# Patient Record
Sex: FEMALE | Race: WHITE | NOT HISPANIC OR LATINO | Employment: OTHER | URBAN - METROPOLITAN AREA
[De-identification: names, ages, dates, MRNs, and addresses within clinical notes are randomized per-mention and may not be internally consistent; named-entity substitution may affect disease eponyms.]

---

## 2017-01-31 ENCOUNTER — ALLSCRIPTS OFFICE VISIT (OUTPATIENT)
Dept: OTHER | Facility: OTHER | Age: 67
End: 2017-01-31

## 2017-05-02 ENCOUNTER — ALLSCRIPTS OFFICE VISIT (OUTPATIENT)
Dept: OTHER | Facility: OTHER | Age: 67
End: 2017-05-02

## 2017-06-12 DIAGNOSIS — M85.80 OTHER SPECIFIED DISORDERS OF BONE DENSITY AND STRUCTURE, UNSPECIFIED SITE: ICD-10-CM

## 2017-06-22 ENCOUNTER — GENERIC CONVERSION - ENCOUNTER (OUTPATIENT)
Dept: OTHER | Facility: OTHER | Age: 67
End: 2017-06-22

## 2017-06-22 ENCOUNTER — HOSPITAL ENCOUNTER (OUTPATIENT)
Dept: RADIOLOGY | Age: 67
Discharge: HOME | End: 2017-06-22
Payer: COMMERCIAL

## 2017-06-22 DIAGNOSIS — M85.80 OTHER SPECIFIED DISORDERS OF BONE DENSITY AND STRUCTURE, UNSPECIFIED SITE: ICD-10-CM

## 2017-06-22 PROCEDURE — 77080 DXA BONE DENSITY AXIAL: CPT

## 2017-07-25 ENCOUNTER — ALLSCRIPTS OFFICE VISIT (OUTPATIENT)
Dept: OTHER | Facility: OTHER | Age: 67
End: 2017-07-25

## 2017-07-25 LAB
BILIRUB UR QL STRIP: NEGATIVE
CLARITY UR: NORMAL
COLOR UR: YELLOW
GLUCOSE (HISTORICAL): NEGATIVE
HGB UR QL STRIP.AUTO: NEGATIVE
KETONES UR STRIP-MCNC: NEGATIVE MG/DL
LEUKOCYTE ESTERASE UR QL STRIP: NORMAL
NITRITE UR QL STRIP: NEGATIVE
PH UR STRIP.AUTO: 6 [PH]
PROT UR STRIP-MCNC: NORMAL MG/DL
SP GR UR STRIP.AUTO: 1.01
UROBILINOGEN UR QL STRIP.AUTO: NEGATIVE

## 2017-10-26 ENCOUNTER — ALLSCRIPTS OFFICE VISIT (OUTPATIENT)
Dept: OTHER | Facility: OTHER | Age: 67
End: 2017-10-26

## 2017-10-26 DIAGNOSIS — Z12.31 ENCOUNTER FOR SCREENING MAMMOGRAM FOR MALIGNANT NEOPLASM OF BREAST: ICD-10-CM

## 2017-10-27 NOTE — PROGRESS NOTES
Assessment  1  Benign essential hypertension (401 1) (I10)   2  Hyperlipidemia (272 4) (E78 5)   3  Hypothyroidism (244 9) (E03 9)    Plan   Hypothyroidism    · Begin or continue regular aerobic exercise  Gradually work up to at least {count1}  sessions of {dur1} of exercise a week ; Status:Complete;   Done: 26Oct2017   · Eat a low fat and low cholesterol diet ; Status:Complete;   Done: 26Oct2017   · We recommend that you bring your body mass index down to 26 ; Status:Complete;    Done: 26Oct2017   · Follow-up visit in 5 months Evaluation and Treatment  Follow-up  Status: Hold For -  Scheduling  Requested for: 26Oct2017   · (1) COMPREHENSIVE METABOLIC PANEL; Status:Active; Requested THI:[id number];    · (1) LIPID PANEL, FASTING; Status:Active; Requested for:03Mar2018;    · (1) TSH; Status:Active; Requested for:03Mar2018;   Need for immunization against influenza    · Fluzone High-Dose 0 5 ML Intramuscular Suspension Prefilled Syringe  Need for pneumococcal vaccination    · Pneumovax 23 25 MCG/0 5ML Injection Injectable    * MAMMO SCREENING BILATERAL W CAD; Status:Hold For - Scheduling; Requested YIR:[id number];   Perform:[facility]; FKC:[id number]; Last Updated Chyna Jasso; 10/26/2017 11:01:00 AM;Ordered; [address]: Encounter for screening mammogram for malignant neoplasm of breast; Ordered By:Jerry Hogan Civil; Discussion/Summary    Htn at goal continue lisinopril-hctz  lipids- continue statin ,low fat/low carb diet  exercsie and weight loss encouraged  hypothyroidism at [address]  pt to continue levothyroxine 88 mcgm  f/u for awv  Possible side effects of new medications were reviewed with the patient/guardian today  The treatment plan was reviewed with the patient/guardian  The patient/guardian understands and agrees with the treatment plan      Chief Complaint  blood work follow up   Patient is here today for follow up of chronic conditions described in HPI        History of Present Illness  pt presetsn for follow up chronic conditions  pt ahs htn on lisinopril - hctz  bp a t gaol  pt on simvastatin for hyperlipidemia  pt aslo on levothyroxine 88 mcgm  labs reviewed show cmp is nomral  lipds are good except triglycerides are 179  low card diet discussed  tsh is nomral  flu vaccine and pneumonvac 23 given  Review of Systems    Constitutional: no recent weight gain-- and-- no recent weight loss  Eyes: no eyesight problems  Cardiovascular: no chest pain-- and-- no lower extremity edema  Respiratory: no cough-- and-- no shortness of breath during exertion  Gastrointestinal: no abdominal pain,-- no nausea,-- no vomiting,-- no constipation-- and-- no diarrhea  Genitourinary: no dysuria  Musculoskeletal: no arthralgias-- and-- no myalgias  Integumentary: no rashes  Neurological: no headache-- and-- no dizziness  Hematologic/Lymphatic: no tendency for easy bleeding-- and-- no tendency for easy bruising  Active Problems  1  Benign essential hypertension (401 1) (I10)   2  Hyperlipidemia (272 4) (E78 5)   3  Hypothyroidism (244 9) (E03 9)   4  History of Need for prophylactic vaccination and inoculation against influenza (V04 81)   (Z23)   5  Osteopenia (733 90) (M85 80)   6  Screening for colon cancer (V76 51) (Z12 11)   7  Screening for osteoporosis (V82 81) (Z13 820)   8  Vitamin D deficiency (268 9) (E55 9)    Past Medical History  1  History of Benign paroxysmal vertigo, unspecified laterality (386 11) (H81 10)   2  History of Denial of substance abuse   3  History of Encounter for gynecological examination with Papanicolaou smear of cervix   (V72 31) (Z01 419)   4  History of Encounter for screening mammogram for malignant neoplasm of breast   (V76 12) (Z12 31)   5  Denied: History of mental disorder   6  History of Need for prophylactic vaccination and inoculation against influenza (V04 81)   (Z23)   7   History of Screening for malignant neoplasm of cervix (V76 2) (Z12 4)    The active problems and past medical history were reviewed and updated today  Surgical History  1  History of Tubal Ligation    Family History  Mother    1  Family history of diabetes mellitus (V18 0) (Z83 3)  Family History    2  Denied: Family history of Denial of substance abuse   3  Denied: No family history of mental disorder    Social History   · Being A Social Drinker   · Never A Smoker  The social history was reviewed and updated today  The social history was reviewed and is unchanged  Current Meds   1  Calcium Citrate + D 315-250 MG-UNIT Oral Tablet; Therapy: (Recorded:53Rcb5148) to Recorded   2  Co Q-10 CAPS; Therapy: (Suszanne Ill) to Recorded   3  Fish Oil CAPS; Therapy: (Suszanne Ill) to Recorded   4  Levothyroxine Sodium 88 MCG Oral Tablet; TAKE 1 TABLET EVERY MORNING; Therapy: 68AHF1926 to (Evaluate:21Apr2018)  Requested for: 23Oct2017; Last   DS:[id number] Ordered   5  Lisinopril-Hydrochlorothiazide 10-12 5 MG Oral Tablet; TAKE 1 TABLET DAILY; Therapy: 50BPW7477 to (Evaluate:20Jan2018)  Requested for: 71Sgh8530; Last   Rx:28Hag6468 Ordered   6  Meclizine HCl - 25 MG Oral Tablet; TAKE 1 TABLET 3-4 TIMES DAILY AS NEEDED; Therapy: 36YCN3151 to (Renew:23Oct2017)  Requested for: 39BLT2779; Last   Rx:[id number] Ordered   7  Simvastatin 20 MG Oral Tablet; take 1 tablet by mouth every day; Therapy: 78KPX1959 to (Evaluate:31Mar2018)  Requested for: 57RYP8625; Last   Rx:02Oct2017 Ordered   8  Vitamin D3 1000 UNIT Oral Capsule; TAKE 1 CAPSULE Daily; Therapy: 13ZFR5206 to (Renew:23Apr2014) Recorded    The medication list was reviewed and updated today  Allergies  1   No Known Drug Allergies    Vitals  Vital Signs    Recorded: 94UAE7617 10:01AM   Temperature 97 6 F, Tympanic   Heart Rate 80, L Brachial Artery   Pulse Quality Normal, L Brachial Artery   Respiration Quality Normal   Respiration 14   Systolic 639, LUE, Sitting   Diastolic 82, LUE, Sitting   Height 5 ft 6 in Weight 186 lb 6 4 oz   BMI Calculated 30 09   BSA Calculated 1 94   O2 Saturation 98     Physical Exam    Constitutional   General appearance: No acute distress, well appearing and well nourished  Head and Face   Head and face: Normal     Eyes   Conjunctiva and lids: No swelling, erythema or discharge  Pupils and irises: Equal, round, reactive to light  Ears, Nose, Mouth, and Throat   External inspection of ears and nose: Normal     Otoscopic examination: Tympanic membranes translucent with normal light reflex  Canals patent without erythema  Oropharynx: Normal with no erythema, edema, exudate or lesions  Neck   Neck: Supple, symmetric, trachea midline, no masses  Thyroid: Normal, no thyromegaly  Pulmonary   Respiratory effort: No increased work of breathing or signs of respiratory distress  Auscultation of lungs: Clear to auscultation  Cardiovascular   Auscultation of heart: Normal rate and rhythm, normal S1 and S2, no murmurs  Carotid pulses: 2+ bilaterally  Examination of extremities for edema and/or varicosities: Normal     Abdomen   Abdomen: Non-tender, no masses  Liver and spleen: No hepatomegaly or splenomegaly  Lymphatic   Palpation of lymph nodes in neck: No lymphadenopathy  Musculoskeletal   Digits and nails: Normal without clubbing or cyanosis  Examination of the extremities revealed no fingernail clubbing-- and-- well perfused fingers  Skin   Skin and subcutaneous tissue: Normal without rashes or lesions  Psychiatric   Judgment and insight: Normal        Results/Data  PHQ-2 Adult Depression Screening 26Oct2017 10:00AM User, Hadley     Test Name Result Flag Reference   PHQ-2 Adult Depression Score 0     Over the last two weeks, how often have you been bothered by any of the following problems?   Little interest or pleasure in doing things: Not at all - 0  Feeling down, depressed, or hopeless: Not at all - 0   PHQ-2 Adult Depression Screening Negative Falls Risk Assessment (Dx Z13 89 Screen for Neurologic Disorder) 26Oct2017 10:00AM User, Ahs     Test Name Result Flag Reference   Falls Risk      No falls in the past year       Future Appointments    Date/Time Provider Specialty Site   11/09/2017 10:00 AM Antionette Donahue-Ufer [facility]   03/26/2018 09:30 AM Jessie Hines, [facility]     Signatures   Electronically signed by : Elijah Mcdaniel, [facility]; Oct 26 2017 10:24AM EST                       (Author)    Electronically signed by : KETTY Carrillo ; Oct 26 2017 12:27PM EST                       (Author)

## 2017-12-19 ENCOUNTER — HOSPITAL ENCOUNTER (OUTPATIENT)
Dept: RADIOLOGY | Age: 67
Discharge: HOME | End: 2017-12-19
Payer: MEDICARE

## 2017-12-19 DIAGNOSIS — Z12.31 ENCOUNTER FOR SCREENING MAMMOGRAM FOR MALIGNANT NEOPLASM OF BREAST: ICD-10-CM

## 2017-12-19 PROCEDURE — G0202 SCR MAMMO BI INCL CAD: HCPCS

## 2017-12-20 ENCOUNTER — GENERIC CONVERSION - ENCOUNTER (OUTPATIENT)
Dept: OTHER | Facility: OTHER | Age: 67
End: 2017-12-20

## 2018-01-10 NOTE — RESULT NOTES
Verified Results  ([address] [facility]) Pap Lb, rfx HPV ASCU 57PPL0857 12:00AM Joieremimovilma, [place] Sherre Soulier Cervical  Other    Sherre Soulier Post Menopausal  No  of containers  Sherre Soulier 01 CYTYC Thin Prep Vial     Test Name Result Flag Reference   DIAGNOSIS: Comment     NEGATIVE FOR INTRAEPITHELIAL LESION AND MALIGNANCY  THE CYTOLOGY PROCESSING WAS PERFORMED AT THE [facility] LOCATED AT  [facility], [address] [address]  Specimen adequacy: Comment     Satisfactory for evaluation  Endocervical and/or squamous metaplastic  cells (endocervical component) are present  Clinician provided ICD10: Comment     Z12 4   Performed by: Aminah Infante Cytotechnologist (ASCP)     Sherre Soulier Note: Comment     The Pap smear is a screening test designed to aid in the detection of  premalignant and malignant conditions of the uterine cervix  It is not a  diagnostic procedure and should not be used as the sole means of detecting  cervical cancer  Both false-positive and false-negative reports do occur    Comment     The HPV DNA reflex criteria were not met with this specimen result  therefore, no HPV testing was performed

## 2018-01-11 NOTE — PROGRESS NOTES
Assessment    1  Encounter for preventive health examination (V70 0) (Z00 00)    Plan  Benign essential hypertension, Hyperlipidemia, Hypothyroidism    · (1) COMPREHENSIVE METABOLIC PANEL; Status:Active; Requested DDO:68ZJM9944;    · (1) LIPID PANEL, FASTING; Status:Active; Requested for:07Oct2017;    · (1) TSH; Status:Active; Requested for:07Oct2017;   PMH: Benign paroxysmal vertigo, unspecified laterality    · Meclizine HCl - 25 MG Oral Tablet; TAKE 1 TABLET 3-4 TIMES DAILY AS  NEEDED    Discussion/Summary  health maintenance visit Currently, she eats a healthy diet  Breast cancer screening: mammogram has been ordered  Colorectal cancer screening: colorectal cancer screening is current  Osteoporosis screening: bone mineral density testing is current  The immunizations are up to date  Advice and education were given regarding weight bearing exercise, calcium supplements and vitamin D supplements  Healthy white femalae  dexa scan reviewed and is sstabel  pt to continue her calcium  vit d and exercises  The treatment plan was reviewed with the patient/guardian  The patient/guardian understands and agrees with the treatment plan      Chief Complaint  physical      History of Present Illness  HM, Adult Female: The patient is being seen for a health maintenance evaluation  The last health maintenance visit was 1 month(s) ago  General Health: The patient's health since the last visit is described as good  She has regular dental visits  She denies vision problems  She denies hearing loss  Immunizations status: up to date  Lifestyle:  She consumes a diverse and healthy diet  She has weight concerns  She exercises regularly  She does not use tobacco  She denies alcohol use  She denies drug use  Screening: Breast cancer screening includes a mammogram performed last year  Colorectal cancer screening includes a colonoscopy performed within the past ten years     Metabolic screening includes lipid profile performed 20`17, glucose screening performed 2017, thyroid function test performed 2017 and DEXA performed 2017  Cardiovascular risk factors: hypertension  Review of Systems    Constitutional: recent 2 lb weight loss  Eyes: no eyesight problems  Cardiovascular: no chest pain, no palpitations and no lower extremity edema  Respiratory: no shortness of breath and no cough  Gastrointestinal: no abdominal pain, no constipation and no diarrhea  Genitourinary: no dysuria  Musculoskeletal: no arthralgias and no myalgias  Integumentary: no rashes  Neurological: no headache  Active Problems    1  Benign essential hypertension (401 1) (I10)   2  Hyperlipidemia (272 4) (E78 5)   3  Hypothyroidism (244 9) (E03 9)   4  History of Need for prophylactic vaccination and inoculation against influenza (V04 81)   (Z23)   5  Osteopenia (733 90) (M85 80)   6  Screening for colon cancer (V76 51) (Z12 11)   7  Vitamin D deficiency (268 9) (E55 9)    Past Medical History    · History of Benign paroxysmal vertigo, unspecified laterality (386 11) (H81 10)   · History of Denial of substance abuse   · History of Encounter for gynecological examination with Papanicolaou smear of cervix  (V72 31) (Z01 419)   · History of Encounter for screening mammogram for malignant neoplasm of breast  (V76 12) (Z12 31)   · Denied: History of mental disorder   · History of Need for immunization against influenza (V04 81) (Z23)   · History of Need for prophylactic vaccination and inoculation against influenza (V04 81)  (Z23)   · History of Screening for malignant neoplasm of cervix (V76 2) (Z12 4)    Surgical History    · History of Tubal Ligation    Family History  Mother    · Family history of diabetes mellitus (V18 0) (Z83 3)  Family History    · Denied: Family history of Denial of substance abuse   · Denied: No family history of mental disorder    Social History    · Being A Social Drinker   · Never A Smoker    Current Meds   1   Calcium Citrate + D 315-250 MG-UNIT Oral Tablet; Therapy: (Recorded:29Cxo3053) to Recorded   2  Co Q-10 CAPS; Therapy: (Cheryl Croon) to Recorded   3  Fish Oil CAPS; Therapy: (Cheryl Croon) to Recorded   4  Levothyroxine Sodium 88 MCG Oral Tablet; TAKE 1 TABLET EVERY MORNING; Therapy: 48KHU3175 to (03 17 74 30 53)  Requested for: 94Wyr9192; Last   Rx:59Vrp8203 Ordered   5  Lisinopril-Hydrochlorothiazide 10-12 5 MG Oral Tablet; TAKE 1 TABLET DAILY; Therapy: 27FKX5829 to (Evaluate:20Jan2018)  Requested for: 40Zky2474; Last   Rx:48Nus9788 Ordered   6  Meclizine HCl - 25 MG Oral Tablet; TAKE 1 TABLET 3-4 TIMES DAILY AS NEEDED; Therapy: 52ZWV6793 to (Renew:71Zya0545)  Requested for: 05Nay8308; Last   Rx:75Ppe5602 Ordered   7  Simvastatin 20 MG Oral Tablet; take 1 tablet by mouth every day; Therapy: 55HWT8739 to (Evaluate:06Jan2018)  Requested for: 16PWG7753; Last   Rx:01Bos7476 Ordered   8  Vitamin D3 1000 UNIT Oral Capsule; TAKE 1 CAPSULE Daily; Therapy: 15NQW6707 to (Renew:23Apr2014) Recorded    Allergies    1  No Known Drug Allergies    Vitals   Recorded: 53Fsm5557 10:44AM   Temperature 97 6 F, Tympanic   Heart Rate 92, L Brachial Artery   Pulse Quality Normal, L Brachial Artery   Respiration Quality Normal   Respiration 14   Systolic 486, RUE, Sitting   Diastolic 82, RUE, Sitting   Height 5 ft 6 in   Weight 187 lb    BMI Calculated 30 18   BSA Calculated 1 94   O2 Saturation 98     Physical Exam    Constitutional   General appearance: No acute distress, well appearing and well nourished  appears healthy and overweight  Head and Face   Head and face: Normal     Eyes   Conjunctiva and lids: No swelling, erythema or discharge  Pupils and irises: Equal, round, reactive to light  Ears, Nose, Mouth, and Throat   External inspection of ears and nose: Normal     Otoscopic examination: Tympanic membranes translucent with normal light reflex  Canals patent without erythema      Oropharynx: Normal with no erythema, edema, exudate or lesions  Neck   Neck: Supple, symmetric, trachea midline, no masses  Thyroid: Normal, no thyromegaly  Pulmonary   Respiratory effort: No increased work of breathing or signs of respiratory distress  Auscultation of lungs: Clear to auscultation  Cardiovascular   Auscultation of heart: Normal rate and rhythm, normal S1 and S2, no murmurs  Carotid pulses: 2+ bilaterally  Examination of extremities for edema and/or varicosities: Normal     Abdomen   Abdomen: Non-tender, no masses  Liver and spleen: No hepatomegaly or splenomegaly  Lymphatic   Palpation of lymph nodes in neck: No lymphadenopathy  Musculoskeletal   Gait and station: Normal     Digits and nails: Normal without clubbing or cyanosis  Examination of the extremities revealed no fingernail clubbing, well perfused fingers and well perfused toes  Muscle strength/tone: Normal     Skin   Skin and subcutaneous tissue: Normal without rashes or lesions  Neurologic   Cranial nerves: Cranial nerves II-XII intact  Cranial Nerve II: visual acuity and visual fields were intact  Cranial Nerves III, IV, and VI: the extraocular motions were intact  Cranial Nerve V: sensation to the face and masseter strength were intact  Cranial Nerve VII: facial strength was intact bilaterally  Reflexes: 2+ and symmetric  Deep tendon reflexes: 2+ right brachioradialis, 2+ left brachioradialis, 2+ right patella and 2+ left patella     Psychiatric   Judgment and insight: Normal     Mood and affect: Normal        Results/Data  Urine Dip Non-Automated- POC 40GLI0355 10:50AM Leida Pérez     Test Name Result Flag Reference   Color Yellow     Clarity Transparent     Leukocytes +     Nitrite negative     Blood negative     Bilirubin negative     Urobilinogen negative     Protein trace     Ph 6     Specific Gravity 1 015     Ketone negative     Glucose negative         Procedure    Procedure: Visual Acuity Test  Indication: routine screening  Inforrmation supplied by a Snellen chart  Results: 20/13 in both eyes without corrective device, 20/15 in the right eye without corrective device, 20/20 in the left eye without corrective device normal in both eyes  Color vision was and the results were normal    The patient tolerated the procedure well  There were no complications  Procedure: Hearing Acuity Test    Indication: Routine screeing  Audiometry: Normal bilaterally  Hearing in the right ear: 25 decibals at 500 hertz, 25 decibals at 1000 hertz, 25 decibals at 2000 hertz and 25 decibals at 4000 hertz  Hearing in the left ear: 25 decibals at 500 hertz, 25 decibals at 1000 hertz, 25 decibals at 2000 hertz and 25 decibals at 4000 hertz  The patient Tolerated the procedure well  There were no complications         Future Appointments    Date/Time Provider Specialty Site   10/26/2017 10:00 AM Melva Méndez [facility]     Verified Results  Urine Dip Non-Automated- POC 47NIQ6985 10:50AM Aniceto Morales     Test Name Result Flag Reference   Color Yellow     Clarity Transparent     Leukocytes +     Nitrite negative     Blood negative     Bilirubin negative     Urobilinogen negative     Protein trace     Ph 6     Specific Gravity 1 015     Ketone negative     Glucose negative         Signatures  Electronically signed by : Melva Aguirre; Jul 25 2017 11:18AM EST                       (Author)

## 2018-01-12 VITALS
DIASTOLIC BLOOD PRESSURE: 82 MMHG | TEMPERATURE: 97.6 F | BODY MASS INDEX: 30.05 KG/M2 | RESPIRATION RATE: 14 BRPM | OXYGEN SATURATION: 98 % | WEIGHT: 187 LBS | HEIGHT: 66 IN | SYSTOLIC BLOOD PRESSURE: 120 MMHG | HEART RATE: 92 BPM

## 2018-01-13 VITALS
OXYGEN SATURATION: 98 % | HEIGHT: 66 IN | SYSTOLIC BLOOD PRESSURE: 122 MMHG | BODY MASS INDEX: 29.96 KG/M2 | DIASTOLIC BLOOD PRESSURE: 82 MMHG | WEIGHT: 186.4 LBS | RESPIRATION RATE: 14 BRPM | HEART RATE: 80 BPM | TEMPERATURE: 97.6 F

## 2018-01-13 NOTE — RESULT NOTES
Verified Results  * DXA BONE DENSITY SPINE HIP AND PELVIS [id number] 11:16AM Jennifer Gomez    Order Number: [id number]    - Patient Instructions: To schedule this appointment, please contact Central Scheduling at [phone]  Test Name Result Flag Reference   DXA BONE DENSITY SPINE HIP AND PELVIS (Report)     CENTRAL DXA SCAN     CLINICAL HISTORY:  61year old post-menopausal  female risk factors include hypothyroidism  TECHNIQUE: Bone densitometry was performed using a Horizon A bone densitometer  Regions of interest appear properly placed  There are no obvious fractures or other confounding variables which could limit the study  COMPARISON: 6/11/2015  RESULTS:    LUMBAR SPINE: L1-L4:   BMD 1 339 gm/cm2   T-score 2 7   Z-score 4 4     LEFT TOTAL HIP:   BMD 1 102 gm/cm2   T-score 1 3   Z-score 2 5     LEFT FEMORAL NECK:   BMD 0 702 gm/cm2   T-score -1 3   Z-score 0 2             IMPRESSION:   1  Based on the [facility] classification, the T-score of -1 3 in the left femoral neck is consistent with low bone mineral density  2  Since the prior study, there has been no significant change in bone mineral density  3  Any secondary causes of low bone mineral density should be excluded prior to treatment, if clinically indicated  4  A daily intake of at least 1200 mg calcium and 800 to 1000 IU of Vitamin D, as well as weight bearing and muscle strengthening exercise, fall prevention and avoidance of tobacco and excessive alcohol intake as basic preventive measures are suggested  5  Repeat DXA in 18 - 24 months, on the same machine, as clinically indicated  The 10 year risk of hip fracture is 0 7%, with the 10 year risk of major osteoporotic fracture being 8 2%, as calculated by the [facility] fracture risk assessment tool (FRAX)  The current NOF guidelines recommend treating patients with FRAX 10 year risk score   of >3% for hip fracture and >20% for major osteoporotic fracture

## 2018-01-14 VITALS
RESPIRATION RATE: 14 BRPM | OXYGEN SATURATION: 99 % | TEMPERATURE: 97.4 F | HEIGHT: 66 IN | DIASTOLIC BLOOD PRESSURE: 82 MMHG | WEIGHT: 189.4 LBS | SYSTOLIC BLOOD PRESSURE: 118 MMHG | HEART RATE: 85 BPM | BODY MASS INDEX: 30.44 KG/M2

## 2018-01-15 VITALS
HEIGHT: 66 IN | DIASTOLIC BLOOD PRESSURE: 88 MMHG | WEIGHT: 191.4 LBS | HEART RATE: 78 BPM | TEMPERATURE: 97.3 F | SYSTOLIC BLOOD PRESSURE: 120 MMHG | OXYGEN SATURATION: 96 % | BODY MASS INDEX: 30.76 KG/M2 | RESPIRATION RATE: 14 BRPM

## 2018-01-15 NOTE — RESULT NOTES
Verified Results  * MAMMO SCREENING BILATERAL W CAD 92Whs9909 11:09AM [facility] Order Number: [id number]     Test Name Result Flag Reference   MAMMO SCREENING BILATERAL W CAD (Report)     Patient History:   Patient is postmenopausal    No known family history of cancer  Took hormonal contraceptives for 4 years  Patient has never smoked  Patient's BMI is 31 5  Reason for exam: screening (asymptomatic)  Screening     Mammo Screening Bilateral W CAD: August 31, 2016 - Check In #:    [de-identified]   Bilateral MLO and CC view(s) were taken  Technologist: JESSICA Mckenzie (R)(M)   Prior study comparison: June 11, 2015, digital bilateral    screening mammogram performed at [address] [facility]  June 5, 2014, digital bilateral screening mammogram performed at    [address] [place] [address]  May 29, 2013, digital bilateral    screening mammogram performed at [address] [place] [address]  May 25, 2012, digital bilateral screening mammogram performed at    [address] [place] [address]  May 20, 2011, digital bilateral    screening mammogram performed at [address] [place] [address]  There are scattered fibroglandular densities  Bilateral digital    mammography is performed  No dominant soft tissue mass,    architectural distortion or suspicious calcifications are noted  The skin and nipple contours are within normal limits  Scattered benign appearing calcifications are noted in the right    breast  Bilateral asymmetries are unchanged  No evidence of    malignancy  No significant changes when compared to prior    studies  1  No evidence of malignancy  2  No significant change when compared with the prior study  ASSESSMENT: BiRad:2 - Benign     Recommendation:   Routine screening mammogram of both breasts in 1 year  A reminder letter will be scheduled   Analyzed by CAD     8-10% of cancers will be missed on mammography   Management of a    palpable abnormality must be based on clinical grounds  Patients   will be notified of their results via letter from our facility  Accredited by [employer] of Radiology and FDA       Transcription Location: JESSICA Barker 98: [id number]     Risk Value(s):   Tyrer-Cuzick 10 Year: 2 799%, Tyrer-Cuzick Lifetime: 6 327%,    Myriad Table: 1 5%, ANDREZ 5 Year: 1 1%, NCI Lifetime: 4 9%   Signed by:   Davy Rivera MD   8/31/16

## 2018-01-18 NOTE — PROGRESS NOTES
Assessment    1  Benign essential hypertension (401 1) (I10)   2  Hyperlipidemia (272 4) (E78 5)   3  Hypothyroidism (244 9) (E03 9)   4  Vitamin D deficiency (268 9) (E55 9)    Plan   Benign essential hypertension, Hyperlipidemia, Hypothyroidism, Vitamin D deficiency    · Begin a limited exercise program ; Status:Complete;   Done: 90PZK9534   · Eat a low fat and low cholesterol diet ; Status:Complete;   Done: 60ORN0678   · We recommend that you bring your body mass index down to 26 ; Status:Complete;    Done: 82XNU0767   · (1) CK (CPK); Status:Active; Requested for:23Apr2016;    · (1) COMPREHENSIVE METABOLIC PANEL; Status:Active; Requested for:23Apr2016;    · (1) LIPID PANEL, FASTING; Status:Active; Requested for:23Apr2016;    · (1) TSH; Status:Active; Requested for:23Apr2016;    · (1) VITAMIN D 25-HYDROXY; Status:Active; Requested for:23Apr2016; Follow-up visit in 3 months Evaluation and Treatment  Follow-up  Status: Hold For - Scheduling  Requested for: 20MWZ2272  Ordered; For: Benign essential hypertension, Hyperlipidemia, Hypothyroidism, Vitamin D deficiency; Ordered By: Kristi Cuevas  Performed:   Due: 36UNC3017     Discussion/Summary    Htn elevated today  pt to continue same meds  watch salt intake  diet and exercise for weight loss  if bp elevated at next appt will increase lisinopril  lipids on statin  labs ordered for next visit  Chief Complaint  patient presented here for follow up on chronic conditions   Patient is here today for follow up of chronic conditions described in HPI  History of Present Illness  pt presents for follow up chronic conditions  pt has hyperlipidemia on statin  htn on meds  hypothyroidism on replacement therapy  labs due for next visit  Review of Systems    Constitutional: no recent weight gain and no recent weight loss  Eyes: no eyesight problems  Cardiovascular: no chest pain, no palpitations and no lower extremity edema     Respiratory: no shortness of breath and no cough  Gastrointestinal: no abdominal pain, no constipation and no diarrhea  Genitourinary: no dysuria  Musculoskeletal: no arthralgias and no myalgias  Integumentary: no rashes  Neurological: no headache  Psychiatric: no sleep disturbances  Active Problems    1  Benign essential hypertension (401 1) (I10)   2  Encounter for screening mammogram for malignant neoplasm of breast (V76 12)   (Z12 31)   3  Hyperlipidemia (272 4) (E78 5)   4  Hypothyroidism (244 9) (E03 9)   5  Need for immunization against influenza (V04 81) (Z23)   6  Need for prophylactic vaccination and inoculation against influenza (V04 81) (Z23)   7  Osteopenia (733 90) (M85 80)   8  Screening for malignant neoplasm of cervix (V76 2) (Z12 4)   9  Vitamin D deficiency (268 9) (E55 9)    Past Medical History    1  History of Benign paroxysmal vertigo, unspecified laterality (386 11) (H81 10)   2  History of Menopause (V49 81)   3  Need for prophylactic vaccination and inoculation against influenza (V04 81) (Z23)    Surgical History    1  History of Tubal Ligation    Family History    1  Family history of diabetes mellitus (V18 0) (Z83 3)    Social History    · Being A Social Drinker   · Never A Smoker    Current Meds   1  Calcium Citrate + D 315-250 MG-UNIT Oral Tablet; Therapy: (Recorded:45Yye4867) to Recorded   2  Co Q-10 CAPS; Therapy: (Adele Mckeon) to Recorded   3  Fish Oil CAPS; Therapy: (Adele Milo) to Recorded   4  Levothyroxine Sodium 88 MCG Oral Tablet; TAKE 1 TABLET EVERY MORNING; Therapy: 25AMQ5872 to (Evaluate:57Ffe1323)  Requested for: 00NNG3762; Last   Rx:02Nov2015 Ordered   5  Lisinopril-Hydrochlorothiazide 10-12 5 MG Oral Tablet; TAKE 1 TABLET DAILY; Therapy: 28JVC9095 to (Vanetta Mcburney)  Requested for: 61NSL9902; Last   Rx:39Zmm1834 Ordered   6  Meclizine HCl - 25 MG Oral Tablet; TAKE 1 TABLET 3-4 TIMES DAILY AS NEEDED;    Therapy: 60GWD5908 to (Angelina Rasheed)  Requested for: 96 773363; Last   Rx:39Wtl1325 Ordered   7  Simvastatin 20 MG Oral Tablet; take 1 tablet by mouth every day; Therapy: 08GKT0421 to (Christella Skiff)  Requested for: 33 93 31; Last   Rx:10Xgo4701 Ordered   8  Vitamin D3 1000 UNIT Oral Capsule; TAKE 1 CAPSULE Daily; Therapy: 66THF5645 to (Renew:23Apr2014) Recorded    Allergies    1  No Known Drug Allergies    Vitals  Vital Signs [Data Includes: Current Encounter]    Recorded: 41KQR8623 11:44AM Recorded: 62YJH6857 11:25AM   Temperature  97 4 F, Tympanic   Heart Rate  66   Pulse Quality  Normal   Respiration  16   Respiration Quality  Normal   Systolic 929 589, LUE, Sitting   Diastolic 90 84, LUE, Sitting   Height  5 ft 6 in   Weight  195 lb 4 oz   BMI Calculated  31 51   BSA Calculated  1 98   O2 Saturation  96     Physical Exam    Constitutional   General appearance: No acute distress, well appearing and well nourished  well developed, appears healthy and overweight  Head and Face   Head and face: Normal     Eyes   Conjunctiva and lids: No swelling, erythema or discharge  Pupils and irises: Equal, round, reactive to light  Ears, Nose, Mouth, and Throat   External inspection of ears and nose: Normal     Otoscopic examination: Tympanic membranes translucent with normal light reflex  Canals patent without erythema  Oropharynx: Normal with no erythema, edema, exudate or lesions  Neck   Neck: Supple, symmetric, trachea midline, no masses  Thyroid: Normal, no thyromegaly  Pulmonary   Respiratory effort: No increased work of breathing or signs of respiratory distress  Auscultation of lungs: Clear to auscultation  Cardiovascular   Auscultation of heart: Normal rate and rhythm, normal S1 and S2, no murmurs  Examination of extremities for edema and/or varicosities: Normal     Abdomen   Abdomen: Non-tender, no masses  Liver and spleen: No hepatomegaly or splenomegaly     Lymphatic   Palpation of lymph nodes in neck: No lymphadenopathy  Musculoskeletal   Digits and nails: Normal without clubbing or cyanosis  Examination of the extremities revealed no fingernail clubbing and well perfused fingers  Skin   Skin and subcutaneous tissue: Normal without rashes or lesions  Psychiatric   Judgment and insight: Normal     Mood and affect: Normal        Future Appointments    Date/Time Provider Specialty Site   04/27/2016 10:00 AM Essie Severs, [facility] [address]   Electronically signed by : Tj Price [facility]; Jan 27 2016 11:48AM EST                       (Author)    Electronically signed by :  KETTY Altamirano ; Feb 2 2016  9:00AM EST                       (Author)

## 2018-01-23 NOTE — RESULT NOTES
Verified Results  * MAMMO SCREENING BILATERAL W CAD [id number] 09:22AM Mai Israel Order Number: [id number]    - Patient Instructions: To schedule this appointment, please contact Central Scheduling at [phone]  Do not wear any perfume, powder, lotion or deodorant on breast or underarm area  Please bring your doctors order, referral (if needed) and insurance information with you on the day of the test  Failure to bring this information may result in this test being rescheduled  Arrive 15 minutes prior to your appointment time to register  On the day of your test, please bring any prior mammogram or breast studies with you that were not performed at a [facility]  Failure to bring prior exams may result in your test needing to be rescheduled  Test Name Result Flag Reference   MAMMO SCREENING BILATERAL W CAD (Report)     Patient History:   Patient is postmenopausal    No known family history of cancer  Took hormonal contraceptives for 4 years  Patient has never smoked  Patient's BMI is 31 5  Reason for exam: screening, asymptomatic  Mammo Screening Bilateral W CAD: December 19, 2017 - Check In #:    [de-identified]   Bilateral MLO and CC view(s) were taken  Technologist: JESSICA Chavez (JESSICA)(M)   Prior study comparison: August 31, 2016, mammo screening    bilateral W CAD performed at [facility]  June 11, 2015, digital bilateral screening mammogram performed at [facility]  June 5, 2014, digital bilateral    screening mammogram performed at [facility]  May 29, 2013, digital bilateral screening mammogram performed at    [facility]  May 25, 2012, digital bilateral    screening mammogram performed at [facility]  [place]  There are scattered fibroglandular densities       No dominant soft tissue mass, architectural distortion or    suspicious calcifications are noted in either breast  The skin    and nipple contours are within normal limits  No evidence of malignancy  No significant changes when compared with prior studies  ACR BI-RADSï¾® Assessments: BiRad:1 - Negative     Recommendation:   Routine screening mammogram of both breasts in 1 year  Analyzed by CAD     The patient is scheduled in a reminder system for screening    mammography  8-10% of cancers will be missed on mammography  Management of a    palpable abnormality must be based on clinical grounds  Patients   will be notified of their results via letter from our facility  Accredited by [employer] of [employer] and FDA       Transcription Location: [facility] 143: [id number]     Risk Value(s):   Tyrer-Cuzick 10 Year: 2 700%, Tyrer-Cuzick Lifetime: 5 700%,    Myriad Table: 1 5%, ANDREZ 5 Year: 1 2%, NCI Lifetime: 4 6%   Signed by:   Natalie Ayoub MD   12/20/17

## 2018-03-22 RX ORDER — MECLIZINE HYDROCHLORIDE 25 MG/1
1 TABLET ORAL
COMMUNITY
Start: 2011-10-16 | End: 2018-11-13 | Stop reason: SDUPTHER

## 2018-03-22 RX ORDER — SIMVASTATIN 20 MG
1 TABLET ORAL DAILY
COMMUNITY
Start: 2012-03-07 | End: 2018-03-29 | Stop reason: SDUPTHER

## 2018-03-22 RX ORDER — LISINOPRIL AND HYDROCHLOROTHIAZIDE 12.5; 1 MG/1; MG/1
1 TABLET ORAL DAILY
Refills: 1 | COMMUNITY
Start: 2018-02-13 | End: 2018-07-27 | Stop reason: SDUPTHER

## 2018-03-22 RX ORDER — CYANOCOBALAMIN (VITAMIN B-12) 1000 MCG
TABLET, EXTENDED RELEASE ORAL
COMMUNITY

## 2018-03-22 RX ORDER — BIOTIN 1 MG
1 TABLET ORAL DAILY
COMMUNITY
Start: 2013-09-25

## 2018-03-22 RX ORDER — DIMENHYDRINATE 50 MG
TABLET ORAL
COMMUNITY

## 2018-03-22 RX ORDER — LEVOTHYROXINE SODIUM 88 UG/1
TABLET ORAL
COMMUNITY
Start: 2018-02-09 | End: 2018-05-07 | Stop reason: SDUPTHER

## 2018-03-26 ENCOUNTER — OFFICE VISIT (OUTPATIENT)
Dept: FAMILY MEDICINE CLINIC | Facility: CLINIC | Age: 68
End: 2018-03-26
Payer: MEDICARE

## 2018-03-26 VITALS
DIASTOLIC BLOOD PRESSURE: 76 MMHG | BODY MASS INDEX: 30.28 KG/M2 | HEIGHT: 66 IN | TEMPERATURE: 97.6 F | SYSTOLIC BLOOD PRESSURE: 120 MMHG | HEART RATE: 83 BPM | OXYGEN SATURATION: 98 % | WEIGHT: 188.4 LBS

## 2018-03-26 DIAGNOSIS — I10 BENIGN ESSENTIAL HYPERTENSION: ICD-10-CM

## 2018-03-26 DIAGNOSIS — E03.9 HYPOTHYROIDISM, UNSPECIFIED TYPE: Primary | ICD-10-CM

## 2018-03-26 DIAGNOSIS — E78.2 MIXED HYPERLIPIDEMIA: ICD-10-CM

## 2018-03-26 DIAGNOSIS — M85.80 OSTEOPENIA, UNSPECIFIED LOCATION: ICD-10-CM

## 2018-03-26 PROCEDURE — 99214 OFFICE O/P EST MOD 30 MIN: CPT | Performed by: PHYSICIAN ASSISTANT

## 2018-03-26 NOTE — PROGRESS NOTES
Assessment/Plan:         Diagnoses and all orders for this visit:    Hypothyroidism, unspecified type  Comments:   hypothyroidism is stable patient to continue levothyroxine 88 mcg  Benign essential hypertension  Comments:    Hypertension at goal continue lisinopril -HCTZ  Osteopenia, unspecified location  Comments:    Patient to continue vitamin-D and calcium along with weight-bearing exercise  Mixed hyperlipidemia  Comments:    Patient to continue low fat low carb diet and her simvastatin  Patient's triglycerides slightly elevated  Will recheck in 6 months  Other orders  -     Calcium Citrate-Vitamin D (CALCIUM CITRATE + D) 315-250 MG-UNIT TABS; Take by mouth  -     coenzyme Q-10 100 MG capsule; Take by mouth  -     Omega-3 Fatty Acids (FISH OIL) 645 MG CAPS; Take by mouth  -     levothyroxine 88 mcg tablet;   -     lisinopril-hydrochlorothiazide (PRINZIDE,ZESTORETIC) 10-12 5 MG per tablet; Take 1 tablet by mouth daily  -     meclizine (ANTIVERT) 25 mg tablet; Take 1 tablet by mouth  -     simvastatin (ZOCOR) 20 mg tablet; Take 1 tablet by mouth daily  -     Cholecalciferol (VITAMIN D3) 1000 units CAPS; Take 1 capsule by mouth daily          Subjective:      Patient ID: Joss Butt is a 74 y o  female  Patient presents for follow up chronic conditions  Patient has hypertension controlled with lisinopril -HCTZ  Lipids are controlled with simvastatin  Patient is taking levothyroxine 88 mcg for hypothyroidism  Patient has osteopenia on calcium vitamin-D therapy  Patient due for eye exam   Labs reviewed with patient show normal CMP normal TSH  Total cholesterol 167 HDL 48 LDL 77 and triglycerides were 211  Low fat low carb diet exercise and weight loss discussed  The following portions of the patient's history were reviewed and updated as appropriate:   She  has no past medical history on file    She   Patient Active Problem List    Diagnosis Date Noted    Vitamin D deficiency 09/25/2013    Benign essential hypertension 06/18/2012    Hyperlipidemia 06/18/2012    Hypothyroidism 06/18/2012    Osteopenia 06/18/2012     Current Outpatient Prescriptions   Medication Sig Dispense Refill    Cholecalciferol (VITAMIN D3) 1000 units CAPS Take 1 capsule by mouth daily      meclizine (ANTIVERT) 25 mg tablet Take 1 tablet by mouth      simvastatin (ZOCOR) 20 mg tablet Take 1 tablet by mouth daily      Calcium Citrate-Vitamin D (CALCIUM CITRATE + D) 315-250 MG-UNIT TABS Take by mouth      coenzyme Q-10 100 MG capsule Take by mouth      levothyroxine 88 mcg tablet       lisinopril-hydrochlorothiazide (PRINZIDE,ZESTORETIC) 10-12 5 MG per tablet Take 1 tablet by mouth daily  1    Omega-3 Fatty Acids (FISH OIL) 645 MG CAPS Take by mouth       No current facility-administered medications for this visit  No current outpatient prescriptions on file prior to visit  No current facility-administered medications on file prior to visit  She has No Known Allergies       Review of Systems   Constitutional: Negative for activity change, appetite change, fever and unexpected weight change  HENT: Negative for ear pain and sore throat  Respiratory: Negative for cough and shortness of breath  Cardiovascular: Negative for chest pain, palpitations and leg swelling  Gastrointestinal: Negative for abdominal pain, blood in stool, constipation and diarrhea  Genitourinary: Negative for dysuria  Musculoskeletal: Negative for arthralgias and myalgias  Neurological: Negative for dizziness, light-headedness and headaches  Psychiatric/Behavioral: Negative for sleep disturbance  Objective:      /76 (BP Location: Left arm, Patient Position: Sitting, Cuff Size: Standard)   Pulse 83   Temp 97 6 °F (36 4 °C)   Ht 5' 6" (1 676 m)   Wt 85 5 kg (188 lb 6 4 oz)   SpO2 98%   BMI 30 41 kg/m²          Physical Exam   Constitutional: She is oriented to person, place, and time   She appears well-developed and well-nourished  No distress  HENT:   Right Ear: External ear normal    Left Ear: External ear normal    Mouth/Throat: Oropharynx is clear and moist    Eyes: Conjunctivae are normal    Neck: Neck supple  No thyromegaly present  Cardiovascular: Normal rate, regular rhythm and normal heart sounds  No murmur heard  Pulmonary/Chest: Effort normal and breath sounds normal    Abdominal: Bowel sounds are normal  She exhibits no mass  There is no tenderness  Lymphadenopathy:     She has cervical adenopathy  Neurological: She is alert and oriented to person, place, and time  Skin: Skin is dry  Psychiatric: She has a normal mood and affect   Her behavior is normal

## 2018-03-29 DIAGNOSIS — E78.5 HYPERLIPIDEMIA, UNSPECIFIED HYPERLIPIDEMIA TYPE: Primary | ICD-10-CM

## 2018-03-29 RX ORDER — SIMVASTATIN 20 MG
20 TABLET ORAL DAILY
Qty: 90 TABLET | Refills: 1 | Status: SHIPPED | OUTPATIENT
Start: 2018-03-29 | End: 2018-09-18 | Stop reason: SDUPTHER

## 2018-05-07 ENCOUNTER — TELEPHONE (OUTPATIENT)
Dept: FAMILY MEDICINE CLINIC | Facility: CLINIC | Age: 68
End: 2018-05-07

## 2018-05-07 DIAGNOSIS — E03.9 HYPOTHYROIDISM, UNSPECIFIED TYPE: Primary | ICD-10-CM

## 2018-05-07 RX ORDER — LEVOTHYROXINE SODIUM 88 UG/1
88 TABLET ORAL DAILY
Qty: 90 TABLET | Refills: 1 | Status: SHIPPED | OUTPATIENT
Start: 2018-05-07 | End: 2018-10-29 | Stop reason: SDUPTHER

## 2018-07-27 DIAGNOSIS — I10 ESSENTIAL HYPERTENSION: Primary | ICD-10-CM

## 2018-07-27 RX ORDER — LISINOPRIL AND HYDROCHLOROTHIAZIDE 12.5; 1 MG/1; MG/1
1 TABLET ORAL DAILY
Qty: 90 TABLET | Refills: 0 | Status: SHIPPED | OUTPATIENT
Start: 2018-07-27 | End: 2018-10-29 | Stop reason: SDUPTHER

## 2018-09-18 DIAGNOSIS — E78.5 HYPERLIPIDEMIA, UNSPECIFIED HYPERLIPIDEMIA TYPE: ICD-10-CM

## 2018-09-18 RX ORDER — SIMVASTATIN 20 MG
20 TABLET ORAL DAILY
Qty: 90 TABLET | Refills: 0 | Status: SHIPPED | OUTPATIENT
Start: 2018-09-18 | End: 2018-12-18 | Stop reason: SDUPTHER

## 2018-09-28 PROBLEM — Z12.39 SCREENING FOR BREAST CANCER: Status: ACTIVE | Noted: 2018-09-28

## 2018-10-02 ENCOUNTER — OFFICE VISIT (OUTPATIENT)
Dept: FAMILY MEDICINE CLINIC | Facility: CLINIC | Age: 68
End: 2018-10-02
Payer: MEDICARE

## 2018-10-02 VITALS
DIASTOLIC BLOOD PRESSURE: 72 MMHG | WEIGHT: 184 LBS | BODY MASS INDEX: 29.57 KG/M2 | TEMPERATURE: 97.8 F | SYSTOLIC BLOOD PRESSURE: 120 MMHG | HEART RATE: 90 BPM | OXYGEN SATURATION: 97 % | HEIGHT: 66 IN

## 2018-10-02 DIAGNOSIS — I10 BENIGN ESSENTIAL HYPERTENSION: ICD-10-CM

## 2018-10-02 DIAGNOSIS — E78.2 MIXED HYPERLIPIDEMIA: ICD-10-CM

## 2018-10-02 DIAGNOSIS — Z00.00 MEDICARE ANNUAL WELLNESS VISIT, SUBSEQUENT: Primary | ICD-10-CM

## 2018-10-02 DIAGNOSIS — Z23 NEED FOR INFLUENZA VACCINATION: ICD-10-CM

## 2018-10-02 DIAGNOSIS — M85.80 OSTEOPENIA, UNSPECIFIED LOCATION: ICD-10-CM

## 2018-10-02 DIAGNOSIS — E03.9 HYPOTHYROIDISM, UNSPECIFIED TYPE: ICD-10-CM

## 2018-10-02 DIAGNOSIS — Z12.39 SCREENING FOR BREAST CANCER: ICD-10-CM

## 2018-10-02 PROCEDURE — G0439 PPPS, SUBSEQ VISIT: HCPCS | Performed by: PHYSICIAN ASSISTANT

## 2018-10-02 PROCEDURE — 90662 IIV NO PRSV INCREASED AG IM: CPT

## 2018-10-02 PROCEDURE — 99214 OFFICE O/P EST MOD 30 MIN: CPT | Performed by: PHYSICIAN ASSISTANT

## 2018-10-02 PROCEDURE — G0008 ADMIN INFLUENZA VIRUS VAC: HCPCS

## 2018-10-02 NOTE — PROGRESS NOTES
Assessment and Plan:    Problem List Items Addressed This Visit        Endocrine    Hypothyroidism - Primary       Cardiovascular and Mediastinum    Benign essential hypertension       Musculoskeletal and Integument    Osteopenia       Other    Hyperlipidemia    Screening for breast cancer      Other Visit Diagnoses     Need for influenza vaccination            Health Maintenance Due   Topic Date Due    Medicare Annual Wellness Visit (AWV)  1950    DTaP,Tdap,and Td Vaccines (1 - Tdap) 10/09/1973    INFLUENZA VACCINE  09/01/2018    Pneumococcal PPSV23/PCV13 65+ Years / Low and Medium Risk (2 of 2 - PCV13) 10/26/2018         HPI:  Angus Méndez is a 74 y o  female here for her Subsequent Wellness Visit  Patient Active Problem List   Diagnosis    Benign essential hypertension    Hyperlipidemia    Hypothyroidism    Osteopenia    Vitamin D deficiency    Screening for breast cancer     No past medical history on file    Past Surgical History:   Procedure Laterality Date    TUBAL LIGATION       Family History   Problem Relation Age of Onset    Diabetes Mother         DM     History   Smoking Status    Never Smoker   Smokeless Tobacco    Never Used     History   Alcohol Use    Yes     Comment: social      History   Drug Use No       Current Outpatient Prescriptions   Medication Sig Dispense Refill    Calcium Citrate-Vitamin D (CALCIUM CITRATE + D) 315-250 MG-UNIT TABS Take by mouth      Cholecalciferol (VITAMIN D3) 1000 units CAPS Take 1 capsule by mouth daily      coenzyme Q-10 100 MG capsule Take by mouth      levothyroxine 88 mcg tablet Take 1 tablet (88 mcg total) by mouth daily 90 tablet 1    lisinopril-hydrochlorothiazide (PRINZIDE,ZESTORETIC) 10-12 5 MG per tablet Take 1 tablet by mouth daily 90 tablet 0    meclizine (ANTIVERT) 25 mg tablet Take 1 tablet by mouth      Omega-3 Fatty Acids (FISH OIL) 645 MG CAPS Take by mouth      simvastatin (ZOCOR) 20 mg tablet Take 1 tablet (20 mg total) by mouth daily 90 tablet 0     No current facility-administered medications for this visit  No Known Allergies  Immunization History   Administered Date(s) Administered     Influenza (IM) Preservative Free 09/21/2012, 09/25/2013, 09/29/2014    Influenza 09/25/2013, 09/30/2015, 10/28/2016, 10/26/2017    Influenza Quadrivalent Preservative Free 3 years and older IM 09/30/2015, 10/28/2016    Influenza Split High Dose Preservative Free IM 10/26/2017    Pneumococcal Polysaccharide PPV23 10/26/2017       Patient Care Team:  Cora Ro PA-C as PCP - General (Family Medicine)  Cora Ro PA-C    Medicare Screening Tests and Risk Assessments:  Whit Lopez is here for her Subsequent Wellness visit  Health Risk Assessment:  Patient rates overall health as very good  Patient feels that their physical health rating is Same  Eyesight was rated as Same  Hearing was rated as Same  Patient feels that their emotional and mental health rating is Same  Pain experienced by patient in the last 7 days has been None  Patient states that she has experienced no weight loss or gain in last 6 months  Emotional/Mental Health:  Patient has been feeling nervous/anxious  PHQ-9 Depression Screening:    Frequency of the following problems over the past two weeks:      1  Little interest or pleasure in doing things: 0 - not at all      2  Feeling down, depressed, or hopeless: 0 - not at all  PHQ-2 Score: 0          Broken Bones/Falls: Fall Risk Assessment:    In the past year, patient has experienced: No history of falling in past year          Bladder/Bowel:  Patient has not leaked urine accidently in the last six months  Patient reports no loss of bowel control  Immunizations:  Patient has had a flu vaccination within the last year  Patient has received a pneumonia shot  Patient has not received a shingles shot  Home Safety:  Patient does not have trouble with stairs inside or outside of their home  Patient currently reports that there are no safety hazards present in home, working smoke alarms, working carbon monoxide detectors  Preventative Screenings:   Breast cancer screening performed, colon cancer screen completed, cholesterol screen completed, no glaucoma eye exam completed    Nutrition:  Current diet: Regular with servings of the following:    Medications:  Patient is currently taking over-the-counter supplements  Patient is able to manage medications  Lifestyle Choices:  Patient reports no tobacco use  Patient has not smoked or used tobacco in the past   Patient reports no alcohol use  Patient drives a vehicle  Patient wears seat belt  Activities of Daily Living:  Can get out of bed by his or her self, able to dress self, able to make own meals, able to do own shopping, able to bathe self, can do own laundry/housekeeping, can manage own money, pay bills and track expenses    Previous Hospitalizations:  No hospitalization or ED visit in past 12 months        Advanced Directives:  Patient has decided on a power of   Patient has spoken to designated power of   Patient has not completed advanced directive          Preventative Screening/Counseling:      Cardiovascular:      General: Screening Current          Diabetes:      General: Screening Current          Colorectal Cancer:      General: Screening Current          Breast Cancer:      General: Screening Current          Cervical Cancer:      General: Screening Current          Osteoporosis:      General: Screening Current      Counseling: Calcium and Vitamin D Intake and Regular Weightbearing Exercise          AAA:      General: Screening Not Indicated          Glaucoma:      Referrals: Optometry          HIV:      General: Screening Not Indicated          Advanced Directives:   Patient has no living will for healthcare, does not have durable POA for healthcare,     Immunizations:      Influenza: Influenza Due Today Shingrix: Risks & Benefits Discussed

## 2018-10-02 NOTE — PROGRESS NOTES
Assessment/Plan:     Diagnoses and all orders for this visit:    Hypothyroidism, unspecified type  Comments:   TSH in normal range patient to continue levothyroxine 88 mcg  Orders:  -     TSH, 3rd generation    Benign essential hypertension  Comments:    Blood pressure is at goal continue lisinopril HCTZ 10-12 5  Orders:  -     Comprehensive metabolic panel    Osteopenia, unspecified location  Comments:   continue calcium vitamin-D and exercise to promote bone health  Orders:  -     DXA bone density spine hip and pelvis; Future    Mixed hyperlipidemia  Comments:   continue low-fat diet and statin therapy for lipid control  Orders:  -     Lipid panel    Screening for breast cancer  -     Mammo screening bilateral w cad; Future    Need for influenza vaccination  -     influenza vaccine, 5912-3005, high-dose, PF 0 5 mL, for patients 65 yr+ (FLUZONE HIGH-DOSE)          Subjective:      Patient ID: Madhuri Garcia is a 74 y o  female  Patient presents for follow up chronic conditions  Patient has hypertension she is on lisinopril -HCTZ 1012  5  Her blood pressure is good control patient is taking simvastatin 20 mg for lipid control  Patient also has hypothyroidism she is on 88 mcg of levothyroxine  Labs reviewed with patient show normal CMP normal TSH  Patient total cholesterol is 145 triglycerides 165 HDL 49 and LDL 66  Flu vaccine given today discussed new shingles vaccine  Mammogram and DEXA scan not due to the end of the year  Routine eye exam advised        The following portions of the patient's history were reviewed and updated as appropriate:   She  has no past medical history on file    She   Patient Active Problem List    Diagnosis Date Noted    Screening for breast cancer 09/28/2018    Vitamin D deficiency 09/25/2013    Benign essential hypertension 06/18/2012    Hyperlipidemia 06/18/2012    Hypothyroidism 06/18/2012    Osteopenia 06/18/2012     Current Outpatient Prescriptions   Medication Sig Dispense Refill    Calcium Citrate-Vitamin D (CALCIUM CITRATE + D) 315-250 MG-UNIT TABS Take by mouth      Cholecalciferol (VITAMIN D3) 1000 units CAPS Take 1 capsule by mouth daily      coenzyme Q-10 100 MG capsule Take by mouth      levothyroxine 88 mcg tablet Take 1 tablet (88 mcg total) by mouth daily 90 tablet 1    lisinopril-hydrochlorothiazide (PRINZIDE,ZESTORETIC) 10-12 5 MG per tablet Take 1 tablet by mouth daily 90 tablet 0    meclizine (ANTIVERT) 25 mg tablet Take 1 tablet by mouth      Omega-3 Fatty Acids (FISH OIL) 645 MG CAPS Take by mouth      simvastatin (ZOCOR) 20 mg tablet Take 1 tablet (20 mg total) by mouth daily 90 tablet 0     No current facility-administered medications for this visit  Current Outpatient Prescriptions on File Prior to Visit   Medication Sig    Calcium Citrate-Vitamin D (CALCIUM CITRATE + D) 315-250 MG-UNIT TABS Take by mouth    Cholecalciferol (VITAMIN D3) 1000 units CAPS Take 1 capsule by mouth daily    coenzyme Q-10 100 MG capsule Take by mouth    levothyroxine 88 mcg tablet Take 1 tablet (88 mcg total) by mouth daily    lisinopril-hydrochlorothiazide (PRINZIDE,ZESTORETIC) 10-12 5 MG per tablet Take 1 tablet by mouth daily    meclizine (ANTIVERT) 25 mg tablet Take 1 tablet by mouth    Omega-3 Fatty Acids (FISH OIL) 645 MG CAPS Take by mouth    simvastatin (ZOCOR) 20 mg tablet Take 1 tablet (20 mg total) by mouth daily     No current facility-administered medications on file prior to visit  She has No Known Allergies       Review of Systems   Constitutional: Negative for activity change, appetite change and unexpected weight change  HENT: Negative for ear pain and sore throat  Eyes: Negative for visual disturbance  Respiratory: Negative for cough, shortness of breath and wheezing  Cardiovascular: Negative for chest pain and leg swelling     Gastrointestinal: Negative for abdominal pain, blood in stool, constipation, diarrhea, nausea and vomiting  Genitourinary: Negative for difficulty urinating  Musculoskeletal: Negative for arthralgias and myalgias  Skin: Negative for rash  Neurological: Negative for dizziness, syncope, light-headedness and headaches  Psychiatric/Behavioral: Negative for sleep disturbance  The patient is not nervous/anxious  Objective:        Physical Exam   Constitutional: She is oriented to person, place, and time  She appears well-developed and well-nourished  No distress  HENT:   Head: Normocephalic and atraumatic  Right Ear: Tympanic membrane, external ear and ear canal normal    Left Ear: Tympanic membrane, external ear and ear canal normal    Mouth/Throat: Oropharynx is clear and moist  No oropharyngeal exudate or posterior oropharyngeal erythema  Eyes: Pupils are equal, round, and reactive to light  Conjunctivae are normal    Neck: Normal range of motion  Neck supple  Carotid bruit is not present  No thyromegaly present  Cardiovascular: Normal rate and regular rhythm  Exam reveals no gallop and no friction rub  No murmur heard  Pulmonary/Chest: Effort normal and breath sounds normal  No respiratory distress  She has no wheezes  Abdominal: Soft  Bowel sounds are normal  She exhibits no distension and no mass  There is no tenderness  Musculoskeletal: Normal range of motion  She exhibits no edema  Lymphadenopathy:     She has no cervical adenopathy  Neurological: She is alert and oriented to person, place, and time  Skin: Skin is warm and dry  No rash noted  She is not diaphoretic  No erythema  Psychiatric: She has a normal mood and affect   Her behavior is normal  Judgment and thought content normal

## 2018-10-29 DIAGNOSIS — E03.9 HYPOTHYROIDISM, UNSPECIFIED TYPE: ICD-10-CM

## 2018-10-29 DIAGNOSIS — I10 ESSENTIAL HYPERTENSION: ICD-10-CM

## 2018-10-29 RX ORDER — LISINOPRIL AND HYDROCHLOROTHIAZIDE 12.5; 1 MG/1; MG/1
1 TABLET ORAL DAILY
Qty: 90 TABLET | Refills: 0 | Status: SHIPPED | OUTPATIENT
Start: 2018-10-29 | End: 2018-10-31 | Stop reason: SDUPTHER

## 2018-10-29 RX ORDER — LEVOTHYROXINE SODIUM 88 UG/1
88 TABLET ORAL DAILY
Qty: 90 TABLET | Refills: 0 | Status: SHIPPED | OUTPATIENT
Start: 2018-10-29 | End: 2018-10-31 | Stop reason: SDUPTHER

## 2018-10-31 RX ORDER — LISINOPRIL AND HYDROCHLOROTHIAZIDE 12.5; 1 MG/1; MG/1
1 TABLET ORAL DAILY
Qty: 90 TABLET | Refills: 0 | Status: SHIPPED | OUTPATIENT
Start: 2018-10-31 | End: 2019-03-01 | Stop reason: SDUPTHER

## 2018-10-31 RX ORDER — LEVOTHYROXINE SODIUM 88 UG/1
88 TABLET ORAL DAILY
Qty: 90 TABLET | Refills: 0 | Status: SHIPPED | OUTPATIENT
Start: 2018-10-31 | End: 2019-04-16 | Stop reason: SDUPTHER

## 2018-11-13 DIAGNOSIS — R42 VERTIGO: Primary | ICD-10-CM

## 2018-11-13 RX ORDER — MECLIZINE HYDROCHLORIDE 25 MG/1
25 TABLET ORAL EVERY 8 HOURS PRN
Qty: 30 TABLET | Refills: 0 | Status: SHIPPED | OUTPATIENT
Start: 2018-11-13 | End: 2020-02-06 | Stop reason: SDUPTHER

## 2018-12-18 DIAGNOSIS — E78.5 HYPERLIPIDEMIA, UNSPECIFIED HYPERLIPIDEMIA TYPE: ICD-10-CM

## 2018-12-18 RX ORDER — SIMVASTATIN 20 MG
20 TABLET ORAL DAILY
Qty: 90 TABLET | Refills: 0 | Status: SHIPPED | OUTPATIENT
Start: 2018-12-18 | End: 2019-03-06 | Stop reason: SDUPTHER

## 2019-03-01 DIAGNOSIS — I10 ESSENTIAL HYPERTENSION: ICD-10-CM

## 2019-03-01 RX ORDER — LISINOPRIL AND HYDROCHLOROTHIAZIDE 12.5; 1 MG/1; MG/1
1 TABLET ORAL DAILY
Qty: 90 TABLET | Refills: 0 | Status: SHIPPED | OUTPATIENT
Start: 2019-03-01 | End: 2019-08-16 | Stop reason: SDUPTHER

## 2019-03-06 DIAGNOSIS — E78.5 HYPERLIPIDEMIA, UNSPECIFIED HYPERLIPIDEMIA TYPE: ICD-10-CM

## 2019-03-06 RX ORDER — SIMVASTATIN 20 MG
20 TABLET ORAL DAILY
Qty: 90 TABLET | Refills: 0 | Status: SHIPPED | OUTPATIENT
Start: 2019-03-06 | End: 2019-06-07 | Stop reason: SDUPTHER

## 2019-04-02 ENCOUNTER — OFFICE VISIT (OUTPATIENT)
Dept: FAMILY MEDICINE CLINIC | Facility: CLINIC | Age: 69
End: 2019-04-02
Payer: MEDICARE

## 2019-04-02 VITALS
OXYGEN SATURATION: 98 % | RESPIRATION RATE: 16 BRPM | HEART RATE: 80 BPM | DIASTOLIC BLOOD PRESSURE: 80 MMHG | HEIGHT: 66 IN | BODY MASS INDEX: 29.28 KG/M2 | TEMPERATURE: 97.4 F | SYSTOLIC BLOOD PRESSURE: 132 MMHG | WEIGHT: 182.2 LBS

## 2019-04-02 DIAGNOSIS — E03.9 HYPOTHYROIDISM, UNSPECIFIED TYPE: Primary | ICD-10-CM

## 2019-04-02 DIAGNOSIS — E78.2 MIXED HYPERLIPIDEMIA: ICD-10-CM

## 2019-04-02 DIAGNOSIS — I10 BENIGN ESSENTIAL HYPERTENSION: ICD-10-CM

## 2019-04-02 DIAGNOSIS — M85.80 OSTEOPENIA, UNSPECIFIED LOCATION: ICD-10-CM

## 2019-04-02 PROCEDURE — 99214 OFFICE O/P EST MOD 30 MIN: CPT | Performed by: PHYSICIAN ASSISTANT

## 2019-04-16 DIAGNOSIS — E03.9 HYPOTHYROIDISM, UNSPECIFIED TYPE: ICD-10-CM

## 2019-04-16 RX ORDER — LEVOTHYROXINE SODIUM 88 UG/1
88 TABLET ORAL DAILY
Qty: 90 TABLET | Refills: 1 | Status: SHIPPED | OUTPATIENT
Start: 2019-04-16 | End: 2019-10-18 | Stop reason: SDUPTHER

## 2019-05-28 ENCOUNTER — OFFICE VISIT (OUTPATIENT)
Dept: FAMILY MEDICINE CLINIC | Facility: CLINIC | Age: 69
End: 2019-05-28
Payer: MEDICARE

## 2019-05-28 VITALS
RESPIRATION RATE: 16 BRPM | HEIGHT: 66 IN | HEART RATE: 68 BPM | WEIGHT: 184.6 LBS | BODY MASS INDEX: 29.67 KG/M2 | DIASTOLIC BLOOD PRESSURE: 98 MMHG | TEMPERATURE: 97.7 F | OXYGEN SATURATION: 98 % | SYSTOLIC BLOOD PRESSURE: 140 MMHG

## 2019-05-28 DIAGNOSIS — N39.0 URINARY TRACT INFECTION WITHOUT HEMATURIA, SITE UNSPECIFIED: Primary | ICD-10-CM

## 2019-05-28 LAB
SL AMB  POCT GLUCOSE, UA: NORMAL
SL AMB LEUKOCYTE ESTERASE,UA: POSITIVE
SL AMB POCT BILIRUBIN,UA: NORMAL
SL AMB POCT BLOOD,UA: POSITIVE
SL AMB POCT CLARITY,UA: NORMAL
SL AMB POCT COLOR,UA: YELLOW
SL AMB POCT KETONES,UA: NORMAL
SL AMB POCT NITRITE,UA: NORMAL
SL AMB POCT PH,UA: 5
SL AMB POCT SPECIFIC GRAVITY,UA: 1.02
SL AMB POCT URINE PROTEIN: POSITIVE
SL AMB POCT UROBILINOGEN: NORMAL

## 2019-05-28 PROCEDURE — 81002 URINALYSIS NONAUTO W/O SCOPE: CPT | Performed by: INTERNAL MEDICINE

## 2019-05-28 PROCEDURE — 87186 SC STD MICRODIL/AGAR DIL: CPT | Performed by: INTERNAL MEDICINE

## 2019-05-28 PROCEDURE — 87086 URINE CULTURE/COLONY COUNT: CPT | Performed by: INTERNAL MEDICINE

## 2019-05-28 PROCEDURE — 87077 CULTURE AEROBIC IDENTIFY: CPT | Performed by: INTERNAL MEDICINE

## 2019-05-28 PROCEDURE — 99214 OFFICE O/P EST MOD 30 MIN: CPT | Performed by: INTERNAL MEDICINE

## 2019-05-28 RX ORDER — SULFAMETHOXAZOLE AND TRIMETHOPRIM 800; 160 MG/1; MG/1
1 TABLET ORAL EVERY 12 HOURS SCHEDULED
Qty: 14 TABLET | Refills: 0 | Status: SHIPPED | OUTPATIENT
Start: 2019-05-28 | End: 2019-06-04

## 2019-05-31 LAB
BACTERIA UR CULT: ABNORMAL
BACTERIA UR CULT: ABNORMAL

## 2019-06-07 DIAGNOSIS — E78.5 HYPERLIPIDEMIA, UNSPECIFIED HYPERLIPIDEMIA TYPE: ICD-10-CM

## 2019-06-07 RX ORDER — SIMVASTATIN 20 MG
20 TABLET ORAL DAILY
Qty: 90 TABLET | Refills: 0 | Status: SHIPPED | OUTPATIENT
Start: 2019-06-07 | End: 2019-09-03 | Stop reason: SDUPTHER

## 2019-08-16 DIAGNOSIS — I10 ESSENTIAL HYPERTENSION: ICD-10-CM

## 2019-08-16 RX ORDER — LISINOPRIL AND HYDROCHLOROTHIAZIDE 12.5; 1 MG/1; MG/1
1 TABLET ORAL DAILY
Qty: 90 TABLET | Refills: 0 | Status: SHIPPED | OUTPATIENT
Start: 2019-08-16 | End: 2019-11-12 | Stop reason: SDUPTHER

## 2019-09-03 DIAGNOSIS — E78.5 HYPERLIPIDEMIA, UNSPECIFIED HYPERLIPIDEMIA TYPE: ICD-10-CM

## 2019-09-04 RX ORDER — SIMVASTATIN 20 MG
20 TABLET ORAL DAILY
Qty: 90 TABLET | Refills: 0 | Status: SHIPPED | OUTPATIENT
Start: 2019-09-04 | End: 2019-12-04 | Stop reason: SDUPTHER

## 2019-10-02 ENCOUNTER — HOSPITAL ENCOUNTER (OUTPATIENT)
Dept: RADIOLOGY | Age: 69
Discharge: HOME | End: 2019-10-02
Payer: MEDICARE

## 2019-10-02 DIAGNOSIS — M85.80 OSTEOPENIA, UNSPECIFIED LOCATION: ICD-10-CM

## 2019-10-02 PROCEDURE — 77080 DXA BONE DENSITY AXIAL: CPT

## 2019-10-03 ENCOUNTER — OFFICE VISIT (OUTPATIENT)
Dept: FAMILY MEDICINE CLINIC | Facility: CLINIC | Age: 69
End: 2019-10-03
Payer: MEDICARE

## 2019-10-03 VITALS
OXYGEN SATURATION: 98 % | WEIGHT: 185 LBS | HEIGHT: 66 IN | RESPIRATION RATE: 16 BRPM | SYSTOLIC BLOOD PRESSURE: 128 MMHG | TEMPERATURE: 97 F | DIASTOLIC BLOOD PRESSURE: 70 MMHG | BODY MASS INDEX: 29.73 KG/M2 | HEART RATE: 100 BPM

## 2019-10-03 DIAGNOSIS — Z12.39 SCREENING FOR BREAST CANCER: ICD-10-CM

## 2019-10-03 DIAGNOSIS — I10 BENIGN ESSENTIAL HYPERTENSION: ICD-10-CM

## 2019-10-03 DIAGNOSIS — R79.89 ELEVATED SERUM CREATININE: ICD-10-CM

## 2019-10-03 DIAGNOSIS — Z00.00 MEDICARE ANNUAL WELLNESS VISIT, SUBSEQUENT: Primary | ICD-10-CM

## 2019-10-03 DIAGNOSIS — E03.9 HYPOTHYROIDISM, UNSPECIFIED TYPE: ICD-10-CM

## 2019-10-03 DIAGNOSIS — E78.2 MIXED HYPERLIPIDEMIA: ICD-10-CM

## 2019-10-03 PROCEDURE — G0438 PPPS, INITIAL VISIT: HCPCS | Performed by: PHYSICIAN ASSISTANT

## 2019-10-03 PROCEDURE — 99214 OFFICE O/P EST MOD 30 MIN: CPT | Performed by: PHYSICIAN ASSISTANT

## 2019-10-03 NOTE — PROGRESS NOTES
Assessment/Plan:     Diagnoses and all orders for this visit:    Medicare annual wellness visit, subsequent    Benign essential hypertension  Comments:  Blood pressure is at goal continue current regimen  Orders:  -     Comprehensive metabolic panel; Future    Hypothyroidism, unspecified type  Comments:  TSH in normal range continue levothyroxine 88 mcg  Orders:  -     Lipid panel; Future    Mixed hyperlipidemia  Comments:  Continue statin therapy and low-fat diet  Decrease carbs to reduce triglycerides    Screening for breast cancer  -     Mammo screening bilateral w cad; Future    Elevated serum creatinine  Comments:  No over-the-counter NSAIDs  recheck renal function  Orders:  -     Basic metabolic panel          Subjective:      Patient ID: Roly Alanis is a 79 y o  female  Patient presents for follow up chronic conditions  Patient has hypothyroidism she is levothyroxine 88 mcg  Her current TSH is in normal range  She has hypertension which is controlled with lisinopril-HCTZ 10-12 5 mg  Patient has osteopenia she is on calcium vitamin-D  Recent bone density shows stability in the osteopenia  She will continue over-the-counter supplements along with weight-bearing exercise  Patient has hyperlipidemia she is on simvastatin 20 mg  Hepatic functions are normal   Total cholesterol 148 triglycerides elevated at 188 HDL 47 and LDL is 63  Discussed low carb low sugar diet for the triglycerides  Patient's BUN was normal her creatinine was a little elevated her GFR was 50  This is a change from her lab work in March  Patient not taking over-the-counter NSAIDs  Will need to repeat BMP  Patient due for flu vaccine and Prevnar 13 however patient has upper respiratory symptoms today  Cough and congestion started about 2 days ago    She has no fever        The following portions of the patient's history were reviewed and updated as appropriate:   She   Patient Active Problem List    Diagnosis Date Noted    Elevated serum creatinine 10/03/2019    Screening for breast cancer 09/28/2018    Vitamin D deficiency 09/25/2013    Benign essential hypertension 06/18/2012    Hyperlipidemia 06/18/2012    Hypothyroidism 06/18/2012    Osteopenia 06/18/2012     Current Outpatient Medications   Medication Sig Dispense Refill    Calcium Citrate-Vitamin D (CALCIUM CITRATE + D) 315-250 MG-UNIT TABS Take by mouth      Cholecalciferol (VITAMIN D3) 1000 units CAPS Take 1 capsule by mouth daily      coenzyme Q-10 100 MG capsule Take by mouth      levothyroxine 88 mcg tablet Take 1 tablet (88 mcg total) by mouth daily 90 tablet 1    lisinopril-hydrochlorothiazide (PRINZIDE,ZESTORETIC) 10-12 5 MG per tablet Take 1 tablet by mouth daily 90 tablet 0    meclizine (ANTIVERT) 25 mg tablet Take 1 tablet (25 mg total) by mouth every 8 (eight) hours as needed for dizziness 30 tablet 0    Omega-3 Fatty Acids (FISH OIL) 645 MG CAPS Take by mouth      simvastatin (ZOCOR) 20 mg tablet Take 1 tablet (20 mg total) by mouth daily 90 tablet 0     No current facility-administered medications for this visit  Current Outpatient Medications on File Prior to Visit   Medication Sig    Calcium Citrate-Vitamin D (CALCIUM CITRATE + D) 315-250 MG-UNIT TABS Take by mouth    Cholecalciferol (VITAMIN D3) 1000 units CAPS Take 1 capsule by mouth daily    coenzyme Q-10 100 MG capsule Take by mouth    levothyroxine 88 mcg tablet Take 1 tablet (88 mcg total) by mouth daily    lisinopril-hydrochlorothiazide (PRINZIDE,ZESTORETIC) 10-12 5 MG per tablet Take 1 tablet by mouth daily    meclizine (ANTIVERT) 25 mg tablet Take 1 tablet (25 mg total) by mouth every 8 (eight) hours as needed for dizziness    Omega-3 Fatty Acids (FISH OIL) 645 MG CAPS Take by mouth    simvastatin (ZOCOR) 20 mg tablet Take 1 tablet (20 mg total) by mouth daily     No current facility-administered medications on file prior to visit  She has No Known Allergies       Review of Systems   Constitutional: Negative for activity change, appetite change, fever and unexpected weight change  HENT: Positive for congestion and sore throat  Negative for ear pain  Eyes: Negative for visual disturbance  Respiratory: Positive for cough  Negative for shortness of breath and wheezing  Cardiovascular: Negative for chest pain and leg swelling  Gastrointestinal: Negative for abdominal pain, blood in stool, constipation, diarrhea, nausea and vomiting  Genitourinary: Negative for difficulty urinating  Musculoskeletal: Negative for arthralgias and myalgias  Skin: Negative for rash  Neurological: Negative for dizziness, syncope, light-headedness and headaches  Psychiatric/Behavioral: Negative for self-injury, sleep disturbance and suicidal ideas  The patient is not nervous/anxious  Objective:        Physical Exam   Constitutional: She is oriented to person, place, and time  She appears well-developed and well-nourished  No distress  HENT:   Head: Normocephalic and atraumatic  Right Ear: Tympanic membrane, external ear and ear canal normal    Left Ear: Tympanic membrane, external ear and ear canal normal    Mouth/Throat: Oropharynx is clear and moist  No oropharyngeal exudate or posterior oropharyngeal erythema  Eyes: Pupils are equal, round, and reactive to light  Conjunctivae are normal    Neck: Carotid bruit is not present  No thyromegaly present  Cardiovascular: Normal rate, regular rhythm and normal heart sounds  Exam reveals no gallop and no friction rub  No murmur heard  Pulmonary/Chest: Effort normal and breath sounds normal  No respiratory distress  She has no wheezes  Abdominal: Soft  Bowel sounds are normal  She exhibits no distension and no mass  There is no tenderness  Musculoskeletal: Normal range of motion  She exhibits no edema  Lymphadenopathy:     She has no cervical adenopathy  Neurological: She is alert and oriented to person, place, and time  Skin: Skin is warm and dry  No rash noted  She is not diaphoretic  No erythema  Psychiatric: She has a normal mood and affect  Her behavior is normal  Judgment and thought content normal    Nursing note and vitals reviewed

## 2019-10-18 DIAGNOSIS — E03.9 HYPOTHYROIDISM, UNSPECIFIED TYPE: ICD-10-CM

## 2019-10-18 RX ORDER — LEVOTHYROXINE SODIUM 88 UG/1
88 TABLET ORAL DAILY
Qty: 90 TABLET | Refills: 1 | Status: SHIPPED | OUTPATIENT
Start: 2019-10-18 | End: 2020-04-14 | Stop reason: SDUPTHER

## 2019-10-24 ENCOUNTER — TELEPHONE (OUTPATIENT)
Dept: FAMILY MEDICINE CLINIC | Facility: CLINIC | Age: 69
End: 2019-10-24

## 2019-10-24 NOTE — TELEPHONE ENCOUNTER
Left message for patient to call  Repeat renal functions are stable but still show they are slightly decreased

## 2019-10-29 ENCOUNTER — TELEPHONE (OUTPATIENT)
Dept: FAMILY MEDICINE CLINIC | Facility: CLINIC | Age: 69
End: 2019-10-29

## 2019-10-29 DIAGNOSIS — R79.89 ELEVATED SERUM CREATININE: Primary | ICD-10-CM

## 2019-10-29 NOTE — TELEPHONE ENCOUNTER
Left message for patient to return call  Renal function decreased but they are stable  No over-the-counter NSAIDs  Would like to repeat the BMP in 2-3 months

## 2019-11-12 DIAGNOSIS — I10 ESSENTIAL HYPERTENSION: ICD-10-CM

## 2019-11-12 RX ORDER — LISINOPRIL AND HYDROCHLOROTHIAZIDE 12.5; 1 MG/1; MG/1
1 TABLET ORAL DAILY
Qty: 90 TABLET | Refills: 1 | Status: SHIPPED | OUTPATIENT
Start: 2019-11-12 | End: 2020-05-06 | Stop reason: SDUPTHER

## 2019-12-04 DIAGNOSIS — E78.5 HYPERLIPIDEMIA, UNSPECIFIED HYPERLIPIDEMIA TYPE: ICD-10-CM

## 2019-12-04 RX ORDER — SIMVASTATIN 20 MG
20 TABLET ORAL DAILY
Qty: 90 TABLET | Refills: 0 | Status: SHIPPED | OUTPATIENT
Start: 2019-12-04 | End: 2020-03-03 | Stop reason: SDUPTHER

## 2020-01-28 ENCOUNTER — TELEPHONE (OUTPATIENT)
Dept: FAMILY MEDICINE CLINIC | Facility: CLINIC | Age: 70
End: 2020-01-28

## 2020-01-28 DIAGNOSIS — N28.9 RENAL INSUFFICIENCY: Primary | ICD-10-CM

## 2020-01-28 NOTE — TELEPHONE ENCOUNTER
----- Message from Joanne Lo LPN sent at 3/04/8454  9:13 AM EST -----  Regarding: Lab Results      ----- Message -----  From: Interface, Transcription Incoming  Sent: 1/7/2020  11:50 AM EST  To: Arina Lackey 480 with patient  Her BUN creatinine are just a little bit off her GFR is 51  Pretty much same since I checked it last month  Reviewed patient's chart  This started in September     Prior to this her BUN and creatinine were normal   Patient denies any overuse of NSAIDs  Patient states she does not take them at all  Patient states he only thing she can think of his in May she had a bladder infection was treated with p o  Antibiotics  Patient does have high blood pressure which was well controlled  Will obtain a a retroperitoneal ultrasound

## 2020-02-06 DIAGNOSIS — R42 VERTIGO: ICD-10-CM

## 2020-02-06 RX ORDER — MECLIZINE HYDROCHLORIDE 25 MG/1
25 TABLET ORAL EVERY 8 HOURS PRN
Qty: 30 TABLET | Refills: 0 | Status: SHIPPED | OUTPATIENT
Start: 2020-02-06 | End: 2021-07-02 | Stop reason: SDUPTHER

## 2020-03-02 ENCOUNTER — HOSPITAL ENCOUNTER (OUTPATIENT)
Dept: RADIOLOGY | Facility: MEDICAL CENTER | Age: 70
Discharge: HOME | End: 2020-03-02
Payer: MEDICARE

## 2020-03-02 DIAGNOSIS — N28.9 RENAL INSUFFICIENCY: ICD-10-CM

## 2020-03-02 PROCEDURE — 76770 US EXAM ABDO BACK WALL COMP: CPT

## 2020-03-03 DIAGNOSIS — E78.5 HYPERLIPIDEMIA, UNSPECIFIED HYPERLIPIDEMIA TYPE: ICD-10-CM

## 2020-03-04 RX ORDER — SIMVASTATIN 20 MG
20 TABLET ORAL DAILY
Qty: 90 TABLET | Refills: 1 | Status: SHIPPED | OUTPATIENT
Start: 2020-03-04 | End: 2021-02-22

## 2020-04-14 DIAGNOSIS — E03.9 HYPOTHYROIDISM, UNSPECIFIED TYPE: ICD-10-CM

## 2020-04-14 RX ORDER — LEVOTHYROXINE SODIUM 88 UG/1
88 TABLET ORAL DAILY
Qty: 90 TABLET | Refills: 0 | Status: SHIPPED | OUTPATIENT
Start: 2020-04-14 | End: 2020-07-08

## 2020-05-06 DIAGNOSIS — I10 ESSENTIAL HYPERTENSION: ICD-10-CM

## 2020-05-06 RX ORDER — LISINOPRIL AND HYDROCHLOROTHIAZIDE 12.5; 1 MG/1; MG/1
1 TABLET ORAL DAILY
Qty: 90 TABLET | Refills: 1 | Status: SHIPPED | OUTPATIENT
Start: 2020-05-06 | End: 2020-10-31

## 2020-06-09 ENCOUNTER — OFFICE VISIT (OUTPATIENT)
Dept: FAMILY MEDICINE CLINIC | Facility: CLINIC | Age: 70
End: 2020-06-09
Payer: MEDICARE

## 2020-06-09 VITALS
RESPIRATION RATE: 16 BRPM | OXYGEN SATURATION: 96 % | DIASTOLIC BLOOD PRESSURE: 78 MMHG | WEIGHT: 185.4 LBS | HEART RATE: 82 BPM | BODY MASS INDEX: 29.8 KG/M2 | HEIGHT: 66 IN | SYSTOLIC BLOOD PRESSURE: 138 MMHG | TEMPERATURE: 97.3 F

## 2020-06-09 DIAGNOSIS — E03.9 HYPOTHYROIDISM, UNSPECIFIED TYPE: ICD-10-CM

## 2020-06-09 DIAGNOSIS — R79.89 ELEVATED SERUM CREATININE: ICD-10-CM

## 2020-06-09 DIAGNOSIS — E78.2 MIXED HYPERLIPIDEMIA: ICD-10-CM

## 2020-06-09 DIAGNOSIS — N28.9 RENAL INSUFFICIENCY: ICD-10-CM

## 2020-06-09 DIAGNOSIS — M85.80 OSTEOPENIA, UNSPECIFIED LOCATION: ICD-10-CM

## 2020-06-09 DIAGNOSIS — I10 BENIGN ESSENTIAL HYPERTENSION: Primary | ICD-10-CM

## 2020-06-09 DIAGNOSIS — R73.01 ELEVATED FASTING GLUCOSE: ICD-10-CM

## 2020-06-09 DIAGNOSIS — M54.31 SCIATICA OF RIGHT SIDE: ICD-10-CM

## 2020-06-09 LAB — SL AMB POCT HEMOGLOBIN AIC: 5.7 (ref ?–6.5)

## 2020-06-09 PROCEDURE — 3075F SYST BP GE 130 - 139MM HG: CPT | Performed by: PHYSICIAN ASSISTANT

## 2020-06-09 PROCEDURE — 3078F DIAST BP <80 MM HG: CPT | Performed by: PHYSICIAN ASSISTANT

## 2020-06-09 PROCEDURE — 83036 HEMOGLOBIN GLYCOSYLATED A1C: CPT | Performed by: PHYSICIAN ASSISTANT

## 2020-06-09 PROCEDURE — 4040F PNEUMOC VAC/ADMIN/RCVD: CPT | Performed by: PHYSICIAN ASSISTANT

## 2020-06-09 PROCEDURE — 3008F BODY MASS INDEX DOCD: CPT | Performed by: PHYSICIAN ASSISTANT

## 2020-06-09 PROCEDURE — 1036F TOBACCO NON-USER: CPT | Performed by: PHYSICIAN ASSISTANT

## 2020-06-09 PROCEDURE — 1160F RVW MEDS BY RX/DR IN RCRD: CPT | Performed by: PHYSICIAN ASSISTANT

## 2020-06-09 PROCEDURE — 99214 OFFICE O/P EST MOD 30 MIN: CPT | Performed by: PHYSICIAN ASSISTANT

## 2020-06-09 RX ORDER — PREDNISONE 10 MG/1
10 TABLET ORAL 2 TIMES DAILY WITH MEALS
Qty: 10 TABLET | Refills: 0 | Status: SHIPPED | OUTPATIENT
Start: 2020-06-09 | End: 2020-09-16 | Stop reason: ALTCHOICE

## 2020-06-15 ENCOUNTER — APPOINTMENT (OUTPATIENT)
Dept: LAB | Facility: MEDICAL CENTER | Age: 70
End: 2020-06-15
Payer: MEDICARE

## 2020-06-15 DIAGNOSIS — E03.9 HYPOTHYROIDISM, UNSPECIFIED TYPE: ICD-10-CM

## 2020-06-15 DIAGNOSIS — I10 BENIGN ESSENTIAL HYPERTENSION: ICD-10-CM

## 2020-06-15 DIAGNOSIS — R79.89 ELEVATED SERUM CREATININE: ICD-10-CM

## 2020-06-15 LAB
ALBUMIN SERPL BCP-MCNC: 3.7 G/DL (ref 3.5–5)
ALP SERPL-CCNC: 54 U/L (ref 46–116)
ALT SERPL W P-5'-P-CCNC: 27 U/L (ref 12–78)
ANION GAP SERPL CALCULATED.3IONS-SCNC: 7 MMOL/L (ref 4–13)
AST SERPL W P-5'-P-CCNC: 13 U/L (ref 5–45)
BILIRUB SERPL-MCNC: 0.69 MG/DL (ref 0.2–1)
BUN SERPL-MCNC: 26 MG/DL (ref 5–25)
CALCIUM SERPL-MCNC: 9.2 MG/DL (ref 8.3–10.1)
CHLORIDE SERPL-SCNC: 103 MMOL/L (ref 100–108)
CHOLEST SERPL-MCNC: 180 MG/DL (ref 50–200)
CO2 SERPL-SCNC: 28 MMOL/L (ref 21–32)
CREAT SERPL-MCNC: 1.16 MG/DL (ref 0.6–1.3)
GFR SERPL CREATININE-BSD FRML MDRD: 49 ML/MIN/1.73SQ M
GLUCOSE P FAST SERPL-MCNC: 73 MG/DL (ref 65–99)
HDLC SERPL-MCNC: 55 MG/DL
LDLC SERPL CALC-MCNC: 87 MG/DL (ref 0–100)
NONHDLC SERPL-MCNC: 125 MG/DL
POTASSIUM SERPL-SCNC: 3.9 MMOL/L (ref 3.5–5.3)
PROT SERPL-MCNC: 7.4 G/DL (ref 6.4–8.2)
SODIUM SERPL-SCNC: 138 MMOL/L (ref 136–145)
TRIGL SERPL-MCNC: 190 MG/DL
TSH SERPL DL<=0.05 MIU/L-ACNC: 1.43 UIU/ML (ref 0.36–3.74)

## 2020-06-15 PROCEDURE — 84443 ASSAY THYROID STIM HORMONE: CPT | Performed by: PHYSICIAN ASSISTANT

## 2020-06-15 PROCEDURE — 80061 LIPID PANEL: CPT | Performed by: PHYSICIAN ASSISTANT

## 2020-06-15 PROCEDURE — 36415 COLL VENOUS BLD VENIPUNCTURE: CPT | Performed by: PHYSICIAN ASSISTANT

## 2020-06-15 PROCEDURE — 80053 COMPREHEN METABOLIC PANEL: CPT | Performed by: PHYSICIAN ASSISTANT

## 2020-06-17 ENCOUNTER — TELEPHONE (OUTPATIENT)
Dept: FAMILY MEDICINE CLINIC | Facility: CLINIC | Age: 70
End: 2020-06-17

## 2020-06-17 DIAGNOSIS — N28.9 RENAL INSUFFICIENCY: ICD-10-CM

## 2020-06-17 DIAGNOSIS — I10 BENIGN ESSENTIAL HYPERTENSION: Primary | ICD-10-CM

## 2020-07-08 DIAGNOSIS — E03.9 HYPOTHYROIDISM, UNSPECIFIED TYPE: ICD-10-CM

## 2020-07-08 RX ORDER — LEVOTHYROXINE SODIUM 88 UG/1
TABLET ORAL
Qty: 90 TABLET | Refills: 0 | Status: SHIPPED | OUTPATIENT
Start: 2020-07-08 | End: 2020-10-05

## 2020-09-17 ENCOUNTER — OFFICE VISIT (OUTPATIENT)
Dept: FAMILY MEDICINE CLINIC | Facility: CLINIC | Age: 70
End: 2020-09-17
Payer: MEDICARE

## 2020-09-17 VITALS
WEIGHT: 181 LBS | DIASTOLIC BLOOD PRESSURE: 78 MMHG | BODY MASS INDEX: 29.09 KG/M2 | OXYGEN SATURATION: 96 % | SYSTOLIC BLOOD PRESSURE: 120 MMHG | TEMPERATURE: 96.9 F | HEART RATE: 105 BPM | RESPIRATION RATE: 18 BRPM | HEIGHT: 66 IN

## 2020-09-17 DIAGNOSIS — E78.2 MIXED HYPERLIPIDEMIA: ICD-10-CM

## 2020-09-17 DIAGNOSIS — E03.9 HYPOTHYROIDISM, UNSPECIFIED TYPE: ICD-10-CM

## 2020-09-17 DIAGNOSIS — I10 BENIGN ESSENTIAL HYPERTENSION: Primary | ICD-10-CM

## 2020-09-17 DIAGNOSIS — Z23 ENCOUNTER FOR IMMUNIZATION: ICD-10-CM

## 2020-09-17 DIAGNOSIS — N28.9 RENAL INSUFFICIENCY: ICD-10-CM

## 2020-09-17 DIAGNOSIS — R73.01 ELEVATED FASTING GLUCOSE: ICD-10-CM

## 2020-09-17 PROCEDURE — 90662 IIV NO PRSV INCREASED AG IM: CPT | Performed by: PHYSICIAN ASSISTANT

## 2020-09-17 PROCEDURE — G0008 ADMIN INFLUENZA VIRUS VAC: HCPCS | Performed by: PHYSICIAN ASSISTANT

## 2020-09-17 PROCEDURE — 99214 OFFICE O/P EST MOD 30 MIN: CPT | Performed by: PHYSICIAN ASSISTANT

## 2020-09-17 NOTE — PROGRESS NOTES
Assessment/Plan:     Diagnoses and all orders for this visit:    Benign essential hypertension  Comments:  Blood pressure is at goal continue current regimen  Patient will let me know if the cough becomes unbearable  He will need to stop lisinopril  Orders:  -     Comprehensive metabolic panel; Future    Mixed hyperlipidemia  Comments:  Continue statin therapy and low-fat diet  Orders:  -     Lipid panel; Future    Hypothyroidism, unspecified type  -     TSH, 3rd generation; Future  -     T4; Future  -     T3; Future    Renal insufficiency  Comments:  No over-the-counter NSAIDs    Encounter for immunization  -     influenza vaccine, high-dose, PF 0 7 mL (FLUZONE HIGH-DOSE)    Elevated fasting glucose  Comments:  Continue low carb low sugar diet  Orders:  -     Hemoglobin A1C; Future          Subjective:      Patient ID: Emily Lea is a 81 y o  female  Patient presents in the office for follow up chronic conditions  Patient has hypertension for which he takes lisinopril-HCTZ 10-12 5  Patient has intermittent dry cough  We discussed that this may be side effect from lisinopril  Patient states the cough is intermittent and she can deal with that at this time  For lipid control she is on simvastatin 20 mg and low-fat diet  Patient also has hypothyroidism she is on levothyroxine 88 mcg  History of elevated fasting blood sugar with A1c in the prediabetic range  Patient has cut out some sugars  We will check labs before her next appointment  Patient also has developed some renal insufficiency  She is not taking any over-the-counter NSAIDs we will watch her kidney functions closely    Flu vaccine given today      The following portions of the patient's history were reviewed and updated as appropriate:   She   Patient Active Problem List    Diagnosis Date Noted    Elevated fasting glucose 06/09/2020    Sciatica of right side 06/09/2020    Renal insufficiency 01/28/2020    Elevated serum creatinine 10/03/2019    Screening for breast cancer 09/28/2018    Vitamin D deficiency 09/25/2013    Benign essential hypertension 06/18/2012    Hyperlipidemia 06/18/2012    Hypothyroidism 06/18/2012    Osteopenia 06/18/2012     Current Outpatient Medications   Medication Sig Dispense Refill    Calcium Citrate-Vitamin D (CALCIUM CITRATE + D) 315-250 MG-UNIT TABS Take by mouth      Cholecalciferol (VITAMIN D3) 1000 units CAPS Take 1 capsule by mouth daily      coenzyme Q-10 100 MG capsule Take by mouth      levothyroxine 88 mcg tablet TAKE 1 TABLET BY MOUTH EVERY DAY 90 tablet 0    lisinopril-hydrochlorothiazide (PRINZIDE,ZESTORETIC) 10-12 5 MG per tablet Take 1 tablet by mouth daily 90 tablet 1    meclizine (ANTIVERT) 25 mg tablet Take 1 tablet (25 mg total) by mouth every 8 (eight) hours as needed for dizziness 30 tablet 0    Omega-3 Fatty Acids (FISH OIL) 645 MG CAPS Take by mouth      simvastatin (ZOCOR) 20 mg tablet Take 1 tablet (20 mg total) by mouth daily 90 tablet 1     No current facility-administered medications for this visit  Current Outpatient Medications on File Prior to Visit   Medication Sig    Calcium Citrate-Vitamin D (CALCIUM CITRATE + D) 315-250 MG-UNIT TABS Take by mouth    Cholecalciferol (VITAMIN D3) 1000 units CAPS Take 1 capsule by mouth daily    coenzyme Q-10 100 MG capsule Take by mouth    levothyroxine 88 mcg tablet TAKE 1 TABLET BY MOUTH EVERY DAY    lisinopril-hydrochlorothiazide (PRINZIDE,ZESTORETIC) 10-12 5 MG per tablet Take 1 tablet by mouth daily    meclizine (ANTIVERT) 25 mg tablet Take 1 tablet (25 mg total) by mouth every 8 (eight) hours as needed for dizziness    Omega-3 Fatty Acids (FISH OIL) 645 MG CAPS Take by mouth    simvastatin (ZOCOR) 20 mg tablet Take 1 tablet (20 mg total) by mouth daily     No current facility-administered medications on file prior to visit  She has No Known Allergies       Review of Systems   Constitutional: Negative for unexpected weight change  HENT: Negative for ear pain and sore throat  Eyes: Negative for visual disturbance  Respiratory: Negative for cough, shortness of breath and wheezing  Cardiovascular: Negative for chest pain and leg swelling  Gastrointestinal: Negative for abdominal pain, blood in stool, constipation, diarrhea, nausea and vomiting  Genitourinary: Negative for difficulty urinating  Musculoskeletal: Negative for arthralgias and myalgias  Skin: Negative for rash  Neurological: Negative for dizziness, syncope, light-headedness and headaches  Psychiatric/Behavioral: Negative for self-injury, sleep disturbance and suicidal ideas  The patient is not nervous/anxious  Objective:        Physical Exam  Vitals signs and nursing note reviewed  Constitutional:       General: She is not in acute distress  Appearance: She is well-developed  She is not diaphoretic  HENT:      Head: Normocephalic and atraumatic  Right Ear: Tympanic membrane, ear canal and external ear normal       Left Ear: Tympanic membrane, ear canal and external ear normal       Mouth/Throat:      Pharynx: No oropharyngeal exudate or posterior oropharyngeal erythema  Eyes:      Conjunctiva/sclera: Conjunctivae normal       Pupils: Pupils are equal, round, and reactive to light  Neck:      Musculoskeletal: Normal range of motion  Thyroid: No thyromegaly  Vascular: No carotid bruit  Cardiovascular:      Rate and Rhythm: Normal rate and regular rhythm  Heart sounds: No murmur  No friction rub  No gallop  Pulmonary:      Effort: Pulmonary effort is normal  No respiratory distress  Breath sounds: Normal breath sounds  No wheezing  Abdominal:      General: Bowel sounds are normal  There is no distension  Palpations: Abdomen is soft  Tenderness: There is no abdominal tenderness  Musculoskeletal:      Right lower leg: No edema  Left lower leg: No edema     Lymphadenopathy: Cervical: No cervical adenopathy  Skin:     General: Skin is warm and dry  Findings: No erythema or rash  Neurological:      Mental Status: She is alert and oriented to person, place, and time  Psychiatric:         Behavior: Behavior normal          Thought Content:  Thought content normal          Judgment: Judgment normal

## 2020-10-01 ENCOUNTER — HOSPITAL ENCOUNTER (OUTPATIENT)
Dept: RADIOLOGY | Age: 70
Discharge: HOME | End: 2020-10-01
Payer: MEDICARE

## 2020-10-01 VITALS — BODY MASS INDEX: 28.93 KG/M2 | WEIGHT: 180 LBS | HEIGHT: 66 IN

## 2020-10-01 DIAGNOSIS — Z12.39 SCREENING FOR BREAST CANCER: ICD-10-CM

## 2020-10-01 PROCEDURE — 77067 SCR MAMMO BI INCL CAD: CPT

## 2020-10-01 PROCEDURE — 77063 BREAST TOMOSYNTHESIS BI: CPT

## 2020-10-05 DIAGNOSIS — E03.9 HYPOTHYROIDISM, UNSPECIFIED TYPE: ICD-10-CM

## 2020-10-05 RX ORDER — LEVOTHYROXINE SODIUM 88 UG/1
TABLET ORAL
Qty: 90 TABLET | Refills: 0 | Status: SHIPPED | OUTPATIENT
Start: 2020-10-05 | End: 2021-01-03

## 2020-10-31 DIAGNOSIS — I10 ESSENTIAL HYPERTENSION: ICD-10-CM

## 2020-10-31 RX ORDER — LISINOPRIL AND HYDROCHLOROTHIAZIDE 12.5; 1 MG/1; MG/1
TABLET ORAL
Qty: 90 TABLET | Refills: 1 | Status: SHIPPED | OUTPATIENT
Start: 2020-10-31 | End: 2021-04-26

## 2020-12-10 ENCOUNTER — LAB (OUTPATIENT)
Dept: LAB | Facility: MEDICAL CENTER | Age: 70
End: 2020-12-10
Payer: MEDICARE

## 2020-12-10 DIAGNOSIS — I10 BENIGN ESSENTIAL HYPERTENSION: ICD-10-CM

## 2020-12-10 DIAGNOSIS — E78.2 MIXED HYPERLIPIDEMIA: ICD-10-CM

## 2020-12-10 DIAGNOSIS — N28.9 RENAL INSUFFICIENCY: ICD-10-CM

## 2020-12-10 DIAGNOSIS — R73.01 ELEVATED FASTING GLUCOSE: ICD-10-CM

## 2020-12-10 DIAGNOSIS — E03.9 HYPOTHYROIDISM, UNSPECIFIED TYPE: ICD-10-CM

## 2020-12-10 LAB
ALBUMIN SERPL BCP-MCNC: 3.6 G/DL (ref 3.5–5)
ALP SERPL-CCNC: 57 U/L (ref 46–116)
ALT SERPL W P-5'-P-CCNC: 20 U/L (ref 12–78)
ANION GAP SERPL CALCULATED.3IONS-SCNC: 5 MMOL/L (ref 4–13)
AST SERPL W P-5'-P-CCNC: 6 U/L (ref 5–45)
BILIRUB SERPL-MCNC: 0.53 MG/DL (ref 0.2–1)
BUN SERPL-MCNC: 20 MG/DL (ref 5–25)
CALCIUM SERPL-MCNC: 9.7 MG/DL (ref 8.3–10.1)
CHLORIDE SERPL-SCNC: 104 MMOL/L (ref 100–108)
CHOLEST SERPL-MCNC: 155 MG/DL (ref 50–200)
CO2 SERPL-SCNC: 29 MMOL/L (ref 21–32)
CREAT SERPL-MCNC: 0.99 MG/DL (ref 0.6–1.3)
EST. AVERAGE GLUCOSE BLD GHB EST-MCNC: 126 MG/DL
GFR SERPL CREATININE-BSD FRML MDRD: 59 ML/MIN/1.73SQ M
GLUCOSE P FAST SERPL-MCNC: 93 MG/DL (ref 65–99)
HBA1C MFR BLD: 6 %
HDLC SERPL-MCNC: 47 MG/DL
LDLC SERPL CALC-MCNC: 79 MG/DL (ref 0–100)
NONHDLC SERPL-MCNC: 108 MG/DL
POTASSIUM SERPL-SCNC: 4.7 MMOL/L (ref 3.5–5.3)
PROT SERPL-MCNC: 7.3 G/DL (ref 6.4–8.2)
SODIUM SERPL-SCNC: 138 MMOL/L (ref 136–145)
T3 SERPL-MCNC: 0.9 NG/ML (ref 0.6–1.8)
T4 SERPL-MCNC: 10.8 UG/DL (ref 4.7–13.3)
TRIGL SERPL-MCNC: 143 MG/DL
TSH SERPL DL<=0.05 MIU/L-ACNC: 1.22 UIU/ML (ref 0.36–3.74)

## 2020-12-10 PROCEDURE — 83036 HEMOGLOBIN GLYCOSYLATED A1C: CPT

## 2020-12-10 PROCEDURE — 80061 LIPID PANEL: CPT

## 2020-12-10 PROCEDURE — 36415 COLL VENOUS BLD VENIPUNCTURE: CPT

## 2020-12-10 PROCEDURE — 80053 COMPREHEN METABOLIC PANEL: CPT

## 2020-12-10 PROCEDURE — 84480 ASSAY TRIIODOTHYRONINE (T3): CPT

## 2020-12-10 PROCEDURE — 84443 ASSAY THYROID STIM HORMONE: CPT

## 2020-12-10 PROCEDURE — 84436 ASSAY OF TOTAL THYROXINE: CPT

## 2020-12-28 ENCOUNTER — OFFICE VISIT (OUTPATIENT)
Dept: FAMILY MEDICINE CLINIC | Facility: CLINIC | Age: 70
End: 2020-12-28
Payer: MEDICARE

## 2020-12-28 VITALS
HEART RATE: 67 BPM | TEMPERATURE: 97.3 F | SYSTOLIC BLOOD PRESSURE: 126 MMHG | WEIGHT: 181 LBS | BODY MASS INDEX: 30.16 KG/M2 | DIASTOLIC BLOOD PRESSURE: 78 MMHG | HEIGHT: 65 IN | RESPIRATION RATE: 16 BRPM | OXYGEN SATURATION: 96 %

## 2020-12-28 DIAGNOSIS — Z00.00 MEDICARE ANNUAL WELLNESS VISIT, SUBSEQUENT: Primary | ICD-10-CM

## 2020-12-28 DIAGNOSIS — E78.2 MIXED HYPERLIPIDEMIA: ICD-10-CM

## 2020-12-28 DIAGNOSIS — R73.01 ELEVATED FASTING GLUCOSE: ICD-10-CM

## 2020-12-28 DIAGNOSIS — I10 BENIGN ESSENTIAL HYPERTENSION: ICD-10-CM

## 2020-12-28 DIAGNOSIS — E03.9 HYPOTHYROIDISM, UNSPECIFIED TYPE: ICD-10-CM

## 2020-12-28 DIAGNOSIS — M85.80 OSTEOPENIA, UNSPECIFIED LOCATION: ICD-10-CM

## 2020-12-28 DIAGNOSIS — N28.9 RENAL INSUFFICIENCY: ICD-10-CM

## 2020-12-28 PROCEDURE — 99214 OFFICE O/P EST MOD 30 MIN: CPT | Performed by: PHYSICIAN ASSISTANT

## 2020-12-28 PROCEDURE — G0439 PPPS, SUBSEQ VISIT: HCPCS | Performed by: PHYSICIAN ASSISTANT

## 2021-01-02 DIAGNOSIS — E03.9 HYPOTHYROIDISM, UNSPECIFIED TYPE: ICD-10-CM

## 2021-01-03 RX ORDER — LEVOTHYROXINE SODIUM 88 UG/1
TABLET ORAL
Qty: 90 TABLET | Refills: 1 | Status: SHIPPED | OUTPATIENT
Start: 2021-01-03 | End: 2021-06-28

## 2021-02-20 DIAGNOSIS — E78.5 HYPERLIPIDEMIA, UNSPECIFIED HYPERLIPIDEMIA TYPE: ICD-10-CM

## 2021-02-22 RX ORDER — SIMVASTATIN 20 MG
TABLET ORAL
Qty: 90 TABLET | Refills: 1 | Status: SHIPPED | OUTPATIENT
Start: 2021-02-22 | End: 2021-08-16

## 2021-03-10 DIAGNOSIS — Z23 ENCOUNTER FOR IMMUNIZATION: ICD-10-CM

## 2021-04-25 DIAGNOSIS — I10 ESSENTIAL HYPERTENSION: ICD-10-CM

## 2021-04-26 RX ORDER — LISINOPRIL AND HYDROCHLOROTHIAZIDE 12.5; 1 MG/1; MG/1
TABLET ORAL
Qty: 90 TABLET | Refills: 1 | Status: SHIPPED | OUTPATIENT
Start: 2021-04-26 | End: 2021-10-25

## 2021-06-22 ENCOUNTER — APPOINTMENT (OUTPATIENT)
Dept: LAB | Facility: MEDICAL CENTER | Age: 71
End: 2021-06-22
Payer: MEDICARE

## 2021-06-22 DIAGNOSIS — I10 BENIGN ESSENTIAL HYPERTENSION: ICD-10-CM

## 2021-06-22 DIAGNOSIS — E78.2 MIXED HYPERLIPIDEMIA: ICD-10-CM

## 2021-06-22 DIAGNOSIS — E03.9 HYPOTHYROIDISM, UNSPECIFIED TYPE: ICD-10-CM

## 2021-06-22 LAB
ALBUMIN SERPL BCP-MCNC: 3.7 G/DL (ref 3.5–5)
ALP SERPL-CCNC: 56 U/L (ref 46–116)
ALT SERPL W P-5'-P-CCNC: 20 U/L (ref 12–78)
ANION GAP SERPL CALCULATED.3IONS-SCNC: 6 MMOL/L (ref 4–13)
AST SERPL W P-5'-P-CCNC: 14 U/L (ref 5–45)
BILIRUB SERPL-MCNC: 0.58 MG/DL (ref 0.2–1)
BUN SERPL-MCNC: 20 MG/DL (ref 5–25)
CALCIUM SERPL-MCNC: 9.4 MG/DL (ref 8.3–10.1)
CHLORIDE SERPL-SCNC: 107 MMOL/L (ref 100–108)
CHOLEST SERPL-MCNC: 173 MG/DL (ref 50–200)
CO2 SERPL-SCNC: 27 MMOL/L (ref 21–32)
CREAT SERPL-MCNC: 0.97 MG/DL (ref 0.6–1.3)
GFR SERPL CREATININE-BSD FRML MDRD: 60 ML/MIN/1.73SQ M
GLUCOSE P FAST SERPL-MCNC: 91 MG/DL (ref 65–99)
HDLC SERPL-MCNC: 53 MG/DL
LDLC SERPL CALC-MCNC: 85 MG/DL (ref 0–100)
NONHDLC SERPL-MCNC: 120 MG/DL
POTASSIUM SERPL-SCNC: 4.4 MMOL/L (ref 3.5–5.3)
PROT SERPL-MCNC: 7.5 G/DL (ref 6.4–8.2)
SODIUM SERPL-SCNC: 140 MMOL/L (ref 136–145)
TRIGL SERPL-MCNC: 174 MG/DL
TSH SERPL DL<=0.05 MIU/L-ACNC: 1.78 UIU/ML (ref 0.36–3.74)

## 2021-06-22 PROCEDURE — 84443 ASSAY THYROID STIM HORMONE: CPT

## 2021-06-22 PROCEDURE — 80061 LIPID PANEL: CPT

## 2021-06-22 PROCEDURE — 36415 COLL VENOUS BLD VENIPUNCTURE: CPT

## 2021-06-22 PROCEDURE — 80053 COMPREHEN METABOLIC PANEL: CPT

## 2021-06-28 ENCOUNTER — OFFICE VISIT (OUTPATIENT)
Dept: FAMILY MEDICINE CLINIC | Facility: CLINIC | Age: 71
End: 2021-06-28
Payer: MEDICARE

## 2021-06-28 VITALS
DIASTOLIC BLOOD PRESSURE: 78 MMHG | BODY MASS INDEX: 30.66 KG/M2 | SYSTOLIC BLOOD PRESSURE: 140 MMHG | RESPIRATION RATE: 18 BRPM | WEIGHT: 184 LBS | TEMPERATURE: 97.3 F | HEIGHT: 65 IN | HEART RATE: 89 BPM | OXYGEN SATURATION: 92 %

## 2021-06-28 DIAGNOSIS — M85.80 OSTEOPENIA, UNSPECIFIED LOCATION: ICD-10-CM

## 2021-06-28 DIAGNOSIS — N28.9 RENAL INSUFFICIENCY: ICD-10-CM

## 2021-06-28 DIAGNOSIS — E03.9 HYPOTHYROIDISM, UNSPECIFIED TYPE: ICD-10-CM

## 2021-06-28 DIAGNOSIS — Z12.11 SCREEN FOR COLON CANCER: ICD-10-CM

## 2021-06-28 DIAGNOSIS — I10 BENIGN ESSENTIAL HYPERTENSION: Primary | ICD-10-CM

## 2021-06-28 DIAGNOSIS — E78.2 MIXED HYPERLIPIDEMIA: ICD-10-CM

## 2021-06-28 PROBLEM — R73.01 ELEVATED FASTING GLUCOSE: Status: RESOLVED | Noted: 2020-06-09 | Resolved: 2021-06-28

## 2021-06-28 PROBLEM — R79.89 ELEVATED SERUM CREATININE: Status: RESOLVED | Noted: 2019-10-03 | Resolved: 2021-06-28

## 2021-06-28 PROCEDURE — 99214 OFFICE O/P EST MOD 30 MIN: CPT | Performed by: PHYSICIAN ASSISTANT

## 2021-06-28 RX ORDER — LEVOTHYROXINE SODIUM 88 UG/1
TABLET ORAL
Qty: 90 TABLET | Refills: 1 | Status: SHIPPED | OUTPATIENT
Start: 2021-06-28 | End: 2021-12-25

## 2021-06-28 NOTE — PROGRESS NOTES
BMI Counseling: Body mass index is 30 39 kg/m²  The BMI is above normal  Nutrition recommendations include decreasing portion sizes, encouraging healthy choices of fruits and vegetables, consuming healthier snacks and moderation in carbohydrate intake  Exercise recommendations include exercising 3-5 times per week  Assessment/Plan:     Diagnoses and all orders for this visit:    Benign essential hypertension  Comments:  Blood pressure is at goal continue current regimen  Orders:  -     Comprehensive metabolic panel; Future    Mixed hyperlipidemia  Comments:  Lipids are acceptable continue low-fat diet and simvastatin  Orders:  -     Lipid panel; Future    Hypothyroidism, unspecified type  Comments:  TSH in normal range continue levothyroxine 88 mcg  Orders:  -     TSH, 3rd generation; Future  -     T4; Future  -     T3; Future    Renal insufficiency  Comments:  Renal functions are normal   Continue no over-the-counter NSAIDs    Osteopenia, unspecified location  Comments:  Continue calcium and vitamin-D supplements    Screen for colon cancer  -     Ambulatory referral to Gastroenterology; Future          Subjective:      Patient ID: Lisa Arguello is a 78 y o  female  Patient presents in the office for follow up chronic conditions  Patient has hypertension she is on lisinopril-HCTZ 10-12  5  Her blood pressure is at goal   For hyperlipidemia she is on simvastatin 20 mg  Hepatic functions are normal   Lipid panel is good except for mildly elevated triglycerides at 174  We discussed diet and exercise  She also has hypothyroidism on levothyroxine 88 mcg  Her current TSH is in normal range    Patient had some abnormal BUN creatinine and GFR in the past her current readings are normal   Discussed colonoscopy        The following portions of the patient's history were reviewed and updated as appropriate:   She   Patient Active Problem List    Diagnosis Date Noted    Sciatica of right side 06/09/2020    Renal insufficiency 01/28/2020    Screening for breast cancer 09/28/2018    Vitamin D deficiency 09/25/2013    Benign essential hypertension 06/18/2012    Hyperlipidemia 06/18/2012    Hypothyroidism 06/18/2012    Osteopenia 06/18/2012     Current Outpatient Medications   Medication Sig Dispense Refill    Calcium Citrate-Vitamin D (CALCIUM CITRATE + D) 315-250 MG-UNIT TABS Take by mouth      Cholecalciferol (VITAMIN D3) 1000 units CAPS Take 1 capsule by mouth daily      coenzyme Q-10 100 MG capsule Take by mouth      levothyroxine 88 mcg tablet TAKE 1 TABLET BY MOUTH EVERY DAY 90 tablet 1    lisinopril-hydrochlorothiazide (PRINZIDE,ZESTORETIC) 10-12 5 MG per tablet TAKE 1 TABLET BY MOUTH EVERY DAY 90 tablet 1    meclizine (ANTIVERT) 25 mg tablet Take 1 tablet (25 mg total) by mouth every 8 (eight) hours as needed for dizziness 30 tablet 0    Omega-3 Fatty Acids (FISH OIL) 645 MG CAPS Take by mouth      simvastatin (ZOCOR) 20 mg tablet TAKE 1 TABLET BY MOUTH EVERY DAY 90 tablet 1     No current facility-administered medications for this visit  She has No Known Allergies       Review of Systems   Constitutional: Negative for activity change and unexpected weight change  HENT: Negative for ear pain and sore throat  Eyes: Negative for visual disturbance  Respiratory: Negative for cough, shortness of breath and wheezing  Cardiovascular: Negative for chest pain and leg swelling  Gastrointestinal: Negative for abdominal pain, blood in stool, constipation, diarrhea, nausea and vomiting  Genitourinary: Negative for difficulty urinating  Musculoskeletal: Negative for arthralgias and myalgias  Skin: Negative for rash  Neurological: Negative for dizziness, syncope, light-headedness and headaches  Psychiatric/Behavioral: Negative for self-injury, sleep disturbance and suicidal ideas  The patient is not nervous/anxious  Objective:        Physical Exam  Vitals and nursing note reviewed  Constitutional:       General: She is not in acute distress  Appearance: Normal appearance  She is well-developed  She is not diaphoretic  HENT:      Head: Normocephalic and atraumatic  Right Ear: Tympanic membrane, ear canal and external ear normal       Left Ear: Tympanic membrane, ear canal and external ear normal    Eyes:      Conjunctiva/sclera: Conjunctivae normal       Pupils: Pupils are equal, round, and reactive to light  Neck:      Thyroid: No thyromegaly  Vascular: No carotid bruit  Cardiovascular:      Rate and Rhythm: Normal rate and regular rhythm  Heart sounds: Normal heart sounds  No murmur heard  No friction rub  No gallop  Pulmonary:      Effort: Pulmonary effort is normal  No respiratory distress  Breath sounds: Normal breath sounds  No wheezing  Abdominal:      General: Bowel sounds are normal  There is no distension  Palpations: Abdomen is soft  There is no mass  Tenderness: There is no abdominal tenderness  Musculoskeletal:      Right lower leg: No edema  Left lower leg: No edema  Lymphadenopathy:      Cervical: No cervical adenopathy  Skin:     General: Skin is warm and dry  Findings: No erythema or rash  Neurological:      General: No focal deficit present  Mental Status: She is alert and oriented to person, place, and time  Psychiatric:         Mood and Affect: Mood normal          Behavior: Behavior normal          Thought Content:  Thought content normal          Judgment: Judgment normal

## 2021-07-02 DIAGNOSIS — R42 VERTIGO: ICD-10-CM

## 2021-07-02 RX ORDER — MECLIZINE HYDROCHLORIDE 25 MG/1
25 TABLET ORAL EVERY 8 HOURS PRN
Qty: 30 TABLET | Refills: 0 | Status: SHIPPED | OUTPATIENT
Start: 2021-07-02 | End: 2022-06-30 | Stop reason: SDUPTHER

## 2021-08-16 DIAGNOSIS — E78.5 HYPERLIPIDEMIA, UNSPECIFIED HYPERLIPIDEMIA TYPE: ICD-10-CM

## 2021-08-16 RX ORDER — SIMVASTATIN 20 MG
TABLET ORAL
Qty: 90 TABLET | Refills: 1 | Status: SHIPPED | OUTPATIENT
Start: 2021-08-16 | End: 2021-12-29 | Stop reason: SDUPTHER

## 2021-10-04 ENCOUNTER — HOSPITAL ENCOUNTER (OUTPATIENT)
Dept: RADIOLOGY | Age: 71
Discharge: HOME | End: 2021-10-04
Payer: MEDICARE

## 2021-10-04 VITALS — HEIGHT: 65 IN | BODY MASS INDEX: 30.66 KG/M2 | WEIGHT: 184 LBS

## 2021-10-04 DIAGNOSIS — Z12.31 ENCOUNTER FOR SCREENING MAMMOGRAM FOR MALIGNANT NEOPLASM OF BREAST: ICD-10-CM

## 2021-10-04 PROCEDURE — 77067 SCR MAMMO BI INCL CAD: CPT

## 2021-10-04 PROCEDURE — 77063 BREAST TOMOSYNTHESIS BI: CPT

## 2021-10-23 DIAGNOSIS — I10 ESSENTIAL HYPERTENSION: ICD-10-CM

## 2021-10-25 RX ORDER — LISINOPRIL AND HYDROCHLOROTHIAZIDE 12.5; 1 MG/1; MG/1
TABLET ORAL
Qty: 90 TABLET | Refills: 1 | Status: SHIPPED | OUTPATIENT
Start: 2021-10-25 | End: 2022-04-17

## 2021-12-20 ENCOUNTER — APPOINTMENT (OUTPATIENT)
Dept: LAB | Facility: MEDICAL CENTER | Age: 71
End: 2021-12-20
Payer: MEDICARE

## 2021-12-20 DIAGNOSIS — E78.2 MIXED HYPERLIPIDEMIA: ICD-10-CM

## 2021-12-20 DIAGNOSIS — E03.9 HYPOTHYROIDISM, UNSPECIFIED TYPE: ICD-10-CM

## 2021-12-20 DIAGNOSIS — I10 BENIGN ESSENTIAL HYPERTENSION: ICD-10-CM

## 2021-12-20 LAB
ALBUMIN SERPL BCP-MCNC: 3.7 G/DL (ref 3.5–5)
ALP SERPL-CCNC: 55 U/L (ref 46–116)
ALT SERPL W P-5'-P-CCNC: 20 U/L (ref 12–78)
ANION GAP SERPL CALCULATED.3IONS-SCNC: 4 MMOL/L (ref 4–13)
AST SERPL W P-5'-P-CCNC: 13 U/L (ref 5–45)
BILIRUB SERPL-MCNC: 1.17 MG/DL (ref 0.2–1)
BUN SERPL-MCNC: 22 MG/DL (ref 5–25)
CALCIUM SERPL-MCNC: 9.4 MG/DL (ref 8.3–10.1)
CHLORIDE SERPL-SCNC: 105 MMOL/L (ref 100–108)
CHOLEST SERPL-MCNC: 166 MG/DL
CO2 SERPL-SCNC: 28 MMOL/L (ref 21–32)
CREAT SERPL-MCNC: 1.06 MG/DL (ref 0.6–1.3)
GFR SERPL CREATININE-BSD FRML MDRD: 53 ML/MIN/1.73SQ M
GLUCOSE P FAST SERPL-MCNC: 100 MG/DL (ref 65–99)
HDLC SERPL-MCNC: 48 MG/DL
LDLC SERPL CALC-MCNC: 85 MG/DL (ref 0–100)
NONHDLC SERPL-MCNC: 118 MG/DL
POTASSIUM SERPL-SCNC: 4.5 MMOL/L (ref 3.5–5.3)
PROT SERPL-MCNC: 7.5 G/DL (ref 6.4–8.2)
SODIUM SERPL-SCNC: 137 MMOL/L (ref 136–145)
T3 SERPL-MCNC: 0.8 NG/ML (ref 0.6–1.8)
T4 SERPL-MCNC: 11.4 UG/DL (ref 4.7–13.3)
TRIGL SERPL-MCNC: 165 MG/DL
TSH SERPL DL<=0.05 MIU/L-ACNC: 1.54 UIU/ML (ref 0.36–3.74)

## 2021-12-20 PROCEDURE — 80053 COMPREHEN METABOLIC PANEL: CPT

## 2021-12-20 PROCEDURE — 36415 COLL VENOUS BLD VENIPUNCTURE: CPT

## 2021-12-20 PROCEDURE — 84480 ASSAY TRIIODOTHYRONINE (T3): CPT

## 2021-12-20 PROCEDURE — 80061 LIPID PANEL: CPT

## 2021-12-20 PROCEDURE — 84443 ASSAY THYROID STIM HORMONE: CPT

## 2021-12-20 PROCEDURE — 84436 ASSAY OF TOTAL THYROXINE: CPT

## 2021-12-25 DIAGNOSIS — E03.9 HYPOTHYROIDISM, UNSPECIFIED TYPE: ICD-10-CM

## 2021-12-25 RX ORDER — LEVOTHYROXINE SODIUM 88 UG/1
TABLET ORAL
Qty: 90 TABLET | Refills: 1 | Status: SHIPPED | OUTPATIENT
Start: 2021-12-25 | End: 2021-12-29 | Stop reason: SDUPTHER

## 2021-12-29 ENCOUNTER — TELEMEDICINE (OUTPATIENT)
Dept: FAMILY MEDICINE CLINIC | Facility: CLINIC | Age: 71
End: 2021-12-29
Payer: MEDICARE

## 2021-12-29 DIAGNOSIS — N18.2 STAGE 2 CHRONIC KIDNEY DISEASE: ICD-10-CM

## 2021-12-29 DIAGNOSIS — M85.852 OSTEOPENIA OF LEFT HIP: ICD-10-CM

## 2021-12-29 DIAGNOSIS — E78.2 MIXED HYPERLIPIDEMIA: ICD-10-CM

## 2021-12-29 DIAGNOSIS — E78.5 HYPERLIPIDEMIA, UNSPECIFIED HYPERLIPIDEMIA TYPE: ICD-10-CM

## 2021-12-29 DIAGNOSIS — I10 BENIGN ESSENTIAL HYPERTENSION: ICD-10-CM

## 2021-12-29 DIAGNOSIS — Z00.00 MEDICARE ANNUAL WELLNESS VISIT, SUBSEQUENT: Primary | ICD-10-CM

## 2021-12-29 DIAGNOSIS — E03.9 HYPOTHYROIDISM, UNSPECIFIED TYPE: ICD-10-CM

## 2021-12-29 PROCEDURE — 1123F ACP DISCUSS/DSCN MKR DOCD: CPT | Performed by: PHYSICIAN ASSISTANT

## 2021-12-29 PROCEDURE — G0439 PPPS, SUBSEQ VISIT: HCPCS | Performed by: PHYSICIAN ASSISTANT

## 2021-12-29 PROCEDURE — 99213 OFFICE O/P EST LOW 20 MIN: CPT | Performed by: PHYSICIAN ASSISTANT

## 2021-12-29 RX ORDER — SIMVASTATIN 20 MG
20 TABLET ORAL DAILY
Qty: 90 TABLET | Refills: 1 | Status: SHIPPED | OUTPATIENT
Start: 2021-12-29

## 2021-12-29 RX ORDER — LEVOTHYROXINE SODIUM 88 UG/1
88 TABLET ORAL DAILY
Qty: 90 TABLET | Refills: 1 | Status: SHIPPED | OUTPATIENT
Start: 2021-12-29 | End: 2022-06-13

## 2022-01-05 ENCOUNTER — NURSE TRIAGE (OUTPATIENT)
Dept: OTHER | Facility: OTHER | Age: 72
End: 2022-01-05

## 2022-01-05 DIAGNOSIS — B34.9 VIRAL ILLNESS: Primary | ICD-10-CM

## 2022-01-05 PROCEDURE — U0003 INFECTIOUS AGENT DETECTION BY NUCLEIC ACID (DNA OR RNA); SEVERE ACUTE RESPIRATORY SYNDROME CORONAVIRUS 2 (SARS-COV-2) (CORONAVIRUS DISEASE [COVID-19]), AMPLIFIED PROBE TECHNIQUE, MAKING USE OF HIGH THROUGHPUT TECHNOLOGIES AS DESCRIBED BY CMS-2020-01-R: HCPCS | Performed by: PHYSICIAN ASSISTANT

## 2022-01-05 PROCEDURE — U0005 INFEC AGEN DETEC AMPLI PROBE: HCPCS | Performed by: PHYSICIAN ASSISTANT

## 2022-01-05 NOTE — TELEPHONE ENCOUNTER
Regarding: (1/2)Covid- [facility]- cough, congestion  ----- Message from Bossman Astudillo RN sent at 1/5/2022  7:57 AM EST -----  I have a cough, congestion, loss of taste and smell

## 2022-01-05 NOTE — TELEPHONE ENCOUNTER
1  Were you within 6 feet or less, for up to 15 minutes or more with a person that has a confirmed COVID-19 test? Yes   2  What was the date of your exposure? 01/02/2022  3  Are you experiencing any symptoms attributed to the virus? Nasal congestion, loss of taste  4  HIGH RISK: Do you have any history heart or lung conditions, weakened immune system, diabetes, Asthma, CHF, HIV, COPD, Chemo, renal failure, sickle cell, etc? Denies  5  VACCINE: "Have you gotten the COVID-19 vaccine?" If Yes ask: "Which one, how many shots, when did you get it?" Yes, 2 doses      Reason for Disposition   [1] COVID-19 infection suspected by caller or triager AND [2] mild symptoms (cough, fever, or others) AND [3] has not gotten tested yet    Protocols used: CORONAVIRUS (COVID-19) DIAGNOSED OR SUSPECTED-[facility]

## 2022-04-10 NOTE — PROGRESS NOTES
Assessment and Plan:     Problem List Items Addressed This Visit        Endocrine    Hypothyroidism       Cardiovascular and Mediastinum    Benign essential hypertension       Other    Hyperlipidemia      Other Visit Diagnoses     Medicare annual wellness visit, subsequent    -  Primary           Preventive health issues were discussed with patient, and age appropriate screening tests were ordered as noted in patient's After Visit Summary  Personalized health advice and appropriate referrals for health education or preventive services given if needed, as noted in patient's After Visit Summary  History of Present Illness:     Patient presents for Medicare Annual Wellness visit    Patient Care Team:  Rigo Hobbs PA-C as PCP - General (Family Medicine)  Rigo Hobbs PA-C     Problem List:     Patient Active Problem List   Diagnosis    Benign essential hypertension    Hyperlipidemia    Hypothyroidism    Osteopenia    Vitamin D deficiency    Screening for breast cancer      Past Medical and Surgical History:     No past medical history on file    Past Surgical History:   Procedure Laterality Date    TUBAL LIGATION        Family History:     Family History   Problem Relation Age of Onset    Diabetes Mother         DM      Social History:     Social History     Socioeconomic History    Marital status: /Civil Union     Spouse name: None    Number of children: None    Years of education: None    Highest education level: None   Occupational History    None   Social Needs    Financial resource strain: None    Food insecurity:     Worry: None     Inability: None    Transportation needs:     Medical: None     Non-medical: None   Tobacco Use    Smoking status: Never Smoker    Smokeless tobacco: Never Used   Substance and Sexual Activity    Alcohol use: Yes     Comment: social    Drug use: No    Sexual activity: None   Lifestyle    Physical activity:     Days per week: None     Minutes per session: None    Stress: None   Relationships    Social connections:     Talks on phone: None     Gets together: None     Attends [employer] service: None     Active member of club or organization: None     Attends meetings of clubs or organizations: None     Relationship status: None    Intimate partner violence:     Fear of current or ex partner: None     Emotionally abused: None     Physically abused: None     Forced sexual activity: None   Other Topics Concern    None   Social History Narrative    None       Medications and Allergies:     Current Outpatient Medications   Medication Sig Dispense Refill    Calcium Citrate-Vitamin D (CALCIUM CITRATE + D) 315-250 MG-UNIT TABS Take by mouth      Cholecalciferol (VITAMIN D3) 1000 units CAPS Take 1 capsule by mouth daily      coenzyme Q-10 100 MG capsule Take by mouth      levothyroxine 88 mcg tablet Take 1 tablet (88 mcg total) by mouth daily 90 tablet 1    lisinopril-hydrochlorothiazide (PRINZIDE,ZESTORETIC) 10-12 5 MG per tablet Take 1 tablet by mouth daily 90 tablet 0    meclizine (ANTIVERT) 25 mg tablet Take 1 tablet (25 mg total) by mouth every 8 (eight) hours as needed for dizziness 30 tablet 0    Omega-3 Fatty Acids (FISH OIL) 645 MG CAPS Take by mouth      simvastatin (ZOCOR) 20 mg tablet Take 1 tablet (20 mg total) by mouth daily 90 tablet 0     No current facility-administered medications for this visit        No Known Allergies   Immunizations:     Immunization History   Administered Date(s) Administered     Influenza (IM) Preservative Free 09/21/2012, 09/25/2013, 09/29/2014    INFLUENZA 09/25/2013, 09/30/2015, 10/28/2016, 10/26/2017    Influenza Quadrivalent Preservative Free 3 years and older IM 09/30/2015, 10/28/2016    Influenza Split High Dose Preservative Free IM 10/26/2017    Influenza, high dose seasonal 0 5 mL 10/02/2018    Pneumococcal Polysaccharide PPV23 10/26/2017      Health Maintenance:         Topic Date Due    Hepatitis C Screening  1950    MAMMOGRAM  12/19/2018    CRC Screening: Colonoscopy  05/13/2021    DXA SCAN  10/02/2021         Topic Date Due    DTaP,Tdap,and Td Vaccines (1 - Tdap) 10/09/1973    Pneumococcal Vaccine: 65+ Years (2 of 2 - PCV13) 10/26/2018    INFLUENZA VACCINE  07/01/2019      Medicare Health Risk Assessment:     /70 (BP Location: Left arm, Patient Position: Sitting, Cuff Size: Standard)   Pulse 100   Temp (!) 97 °F (36 1 °C)   Resp 16   Ht 5' 6" (1 676 m)   Wt 83 9 kg (185 lb)   SpO2 98%   BMI 29 86 kg/m²      Yoan Orozco is here for her Subsequent Wellness visit  Last Medicare Wellness visit information reviewed, patient interviewed and updates made to the record today  Health Risk Assessment:   Patient rates overall health as good  Patient feels that their physical health rating is same  Eyesight was rated as same  Hearing was rated as same  Patient feels that their emotional and mental health rating is same  Pain experienced in the last 7 days has been none  Patient states that she has experienced no weight loss or gain in last 6 months  Depression Screening:   PHQ-2 Score: 0      Fall Risk Screening: In the past year, patient has experienced: no history of falling in past year      Urinary Incontinence Screening:   Patient has not leaked urine accidently in the last six months  Home Safety:  Patient does not have trouble with stairs inside or outside of their home  Patient has working smoke alarms and has working carbon monoxide detector  Home safety hazards include: none  Nutrition:   Current diet is Regular  Medications:   Patient is currently taking over-the-counter supplements  OTC medications include: see medication list  Patient is able to manage medications       Activities of Daily Living (ADLs)/Instrumental Activities of Daily Living (IADLs):   Walk and transfer into and out of bed and chair?: Yes  Dress and groom yourself?: Yes    Bathe or shower yourself?: Yes    Feed yourself?  Yes  Do your laundry/housekeeping?: Yes  Manage your money, pay your bills and track your expenses?: Yes  Make your own meals?: Yes    Do your own shopping?: Yes    Previous Hospitalizations:   Any hospitalizations or ED visits within the last 12 months?: No      Advance Care Planning:   Living will: No    Durable POA for healthcare: No    Advanced directive: No      Cognitive Screening:   Provider or family/friend/caregiver concerned regarding cognition?: No    PREVENTIVE SCREENINGS      Cardiovascular Screening:    General: History Lipid Disorder and Screening Current      Diabetes Screening:     General: Screening Current      Colorectal Cancer Screening:     General: Screening Current      Breast Cancer Screening:       Due for: Mammogram        Cervical Cancer Screening:    General: Screening Not Indicated      Osteoporosis Screening:    General: Screening Current      Abdominal Aortic Aneurysm (AAA) Screening:        General: Screening Not Indicated      Lung Cancer Screening:     General: Screening Not Indicated      Reyes Cruz PA-C no

## 2022-04-17 DIAGNOSIS — I10 ESSENTIAL HYPERTENSION: ICD-10-CM

## 2022-04-17 RX ORDER — LISINOPRIL AND HYDROCHLOROTHIAZIDE 12.5; 1 MG/1; MG/1
TABLET ORAL
Qty: 90 TABLET | Refills: 1 | Status: SHIPPED | OUTPATIENT
Start: 2022-04-17

## 2022-06-12 DIAGNOSIS — E03.9 HYPOTHYROIDISM, UNSPECIFIED TYPE: ICD-10-CM

## 2022-06-13 RX ORDER — LEVOTHYROXINE SODIUM 88 UG/1
TABLET ORAL
Qty: 90 TABLET | Refills: 1 | Status: SHIPPED | OUTPATIENT
Start: 2022-06-13

## 2022-06-24 ENCOUNTER — RA CDI HCC (OUTPATIENT)
Dept: OTHER | Facility: HOSPITAL | Age: 72
End: 2022-06-24

## 2022-06-24 ENCOUNTER — APPOINTMENT (OUTPATIENT)
Dept: LAB | Facility: MEDICAL CENTER | Age: 72
End: 2022-06-24
Payer: MEDICARE

## 2022-06-24 DIAGNOSIS — E03.9 HYPOTHYROIDISM, UNSPECIFIED TYPE: ICD-10-CM

## 2022-06-24 DIAGNOSIS — I10 BENIGN ESSENTIAL HYPERTENSION: ICD-10-CM

## 2022-06-24 DIAGNOSIS — E78.2 MIXED HYPERLIPIDEMIA: ICD-10-CM

## 2022-06-24 LAB
ALBUMIN SERPL BCP-MCNC: 3.6 G/DL (ref 3.5–5)
ALP SERPL-CCNC: 58 U/L (ref 46–116)
ALT SERPL W P-5'-P-CCNC: 21 U/L (ref 12–78)
ANION GAP SERPL CALCULATED.3IONS-SCNC: 2 MMOL/L (ref 4–13)
AST SERPL W P-5'-P-CCNC: 14 U/L (ref 5–45)
BILIRUB SERPL-MCNC: 0.78 MG/DL (ref 0.2–1)
BUN SERPL-MCNC: 20 MG/DL (ref 5–25)
CALCIUM SERPL-MCNC: 9.6 MG/DL (ref 8.3–10.1)
CHLORIDE SERPL-SCNC: 103 MMOL/L (ref 100–108)
CHOLEST SERPL-MCNC: 167 MG/DL
CO2 SERPL-SCNC: 30 MMOL/L (ref 21–32)
CREAT SERPL-MCNC: 1 MG/DL (ref 0.6–1.3)
GFR SERPL CREATININE-BSD FRML MDRD: 57 ML/MIN/1.73SQ M
GLUCOSE P FAST SERPL-MCNC: 91 MG/DL (ref 65–99)
HDLC SERPL-MCNC: 46 MG/DL
LDLC SERPL CALC-MCNC: 80 MG/DL (ref 0–100)
NONHDLC SERPL-MCNC: 121 MG/DL
POTASSIUM SERPL-SCNC: 4 MMOL/L (ref 3.5–5.3)
PROT SERPL-MCNC: 7.7 G/DL (ref 6.4–8.2)
SODIUM SERPL-SCNC: 135 MMOL/L (ref 136–145)
TRIGL SERPL-MCNC: 204 MG/DL
TSH SERPL DL<=0.05 MIU/L-ACNC: 2.07 UIU/ML (ref 0.45–4.5)

## 2022-06-24 PROCEDURE — 36415 COLL VENOUS BLD VENIPUNCTURE: CPT

## 2022-06-24 PROCEDURE — 80053 COMPREHEN METABOLIC PANEL: CPT

## 2022-06-24 PROCEDURE — 84443 ASSAY THYROID STIM HORMONE: CPT

## 2022-06-24 PROCEDURE — 80061 LIPID PANEL: CPT

## 2022-06-24 NOTE — PROGRESS NOTES
[address]  coding opportunities       Chart reviewed, no opportunity found: CHART REVIEWED, NO OPPORTUNITY FOUND        Patients Insurance     Medicare Insurance: Medicare

## 2022-06-30 ENCOUNTER — OFFICE VISIT (OUTPATIENT)
Dept: FAMILY MEDICINE CLINIC | Facility: CLINIC | Age: 72
End: 2022-06-30
Payer: MEDICARE

## 2022-06-30 VITALS
OXYGEN SATURATION: 96 % | BODY MASS INDEX: 34.19 KG/M2 | WEIGHT: 185.8 LBS | DIASTOLIC BLOOD PRESSURE: 84 MMHG | HEART RATE: 81 BPM | TEMPERATURE: 97.4 F | HEIGHT: 62 IN | SYSTOLIC BLOOD PRESSURE: 134 MMHG

## 2022-06-30 DIAGNOSIS — E03.9 HYPOTHYROIDISM, UNSPECIFIED TYPE: ICD-10-CM

## 2022-06-30 DIAGNOSIS — I10 BENIGN ESSENTIAL HYPERTENSION: Primary | ICD-10-CM

## 2022-06-30 DIAGNOSIS — E55.9 VITAMIN D DEFICIENCY: ICD-10-CM

## 2022-06-30 DIAGNOSIS — R42 VERTIGO: ICD-10-CM

## 2022-06-30 DIAGNOSIS — N28.9 RENAL INSUFFICIENCY: ICD-10-CM

## 2022-06-30 DIAGNOSIS — E78.2 MIXED HYPERLIPIDEMIA: ICD-10-CM

## 2022-06-30 PROCEDURE — 99214 OFFICE O/P EST MOD 30 MIN: CPT | Performed by: PHYSICIAN ASSISTANT

## 2022-06-30 RX ORDER — MECLIZINE HYDROCHLORIDE 25 MG/1
25 TABLET ORAL EVERY 8 HOURS PRN
Qty: 30 TABLET | Refills: 0 | Status: SHIPPED | OUTPATIENT
Start: 2022-06-30

## 2022-06-30 NOTE — PROGRESS NOTES
BMI Counseling: Body mass index is 33 71 kg/m²  The BMI is above normal  Nutrition recommendations include decreasing portion sizes, encouraging healthy choices of fruits and vegetables and moderation in carbohydrate intake  Exercise recommendations include exercising 3-5 times per week  Rationale for BMI follow-up plan is due to patient being overweight or obese  Depression Screening and Follow-up Plan: Patient was screened for depression during today's encounter  They screened negative with a PHQ-2 score of 0  Assessment/Plan:     Diagnoses and all orders for this visit:    Benign essential hypertension  Comments:  Continue current regimen  Patient will monitor her blood pressure at home 2 times a week  Call if the diastolic is over 90  Orders:  -     Comprehensive metabolic panel; Future    Hypothyroidism, unspecified type  Comments:  TSH in normal range continue levothyroxine 88 mcg  Orders:  -     TSH, 3rd generation; Future  -     T4; Future  -     T3; Future    Mixed hyperlipidemia  Comments:  Continue statin therapy and low-fat diet  Decrease carbohydrates to lower triglycerides  Orders:  -     Lipid panel; Future    Renal insufficiency  Comments:  No over-the-counter NSAIDs  Will monitor renal functions    Vitamin D deficiency    Vertigo  -     meclizine (ANTIVERT) 25 mg tablet; Take 1 tablet (25 mg total) by mouth every 8 (eight) hours as needed for dizziness          Subjective:      Patient ID: Randal Milligan is a 73 y o  female  Presents in the office for follow up chronic conditions  Patient has hypertension with mild renal insufficiency  She is currently on lisinopril-HCTZ 10-12 5  She also has hypothyroidism on levothyroxine 88 mcg  Her current TSH is in normal range  For hyperlipidemia she is on simvastatin 20 mg  Her blood sugar and liver functions are normal   Patient's triglycerides were elevated at 204  We discussed low carb low sugar diet    Patient's sodium a little low at 135 BUN and creatinine are normal in GFR is stable at 57      The following portions of the patient's history were reviewed and updated as appropriate:   She   Patient Active Problem List    Diagnosis Date Noted    Sciatica of right side 06/09/2020    Renal insufficiency 01/28/2020    Screening for breast cancer 09/28/2018    Vitamin D deficiency 09/25/2013    Benign essential hypertension 06/18/2012    Hyperlipidemia 06/18/2012    Hypothyroidism 06/18/2012    Osteopenia 06/18/2012     Current Outpatient Medications   Medication Sig Dispense Refill    Calcium Citrate-Vitamin D 315-250 MG-UNIT TABS Take by mouth      Cholecalciferol (VITAMIN D3) 1000 units CAPS Take 1 capsule by mouth daily      coenzyme Q-10 100 MG capsule Take by mouth      levothyroxine 88 mcg tablet TAKE 1 TABLET BY MOUTH EVERY DAY 90 tablet 1    lisinopril-hydrochlorothiazide (PRINZIDE,ZESTORETIC) 10-12 5 MG per tablet TAKE 1 TABLET BY MOUTH EVERY DAY 90 tablet 1    meclizine (ANTIVERT) 25 mg tablet Take 1 tablet (25 mg total) by mouth every 8 (eight) hours as needed for dizziness 30 tablet 0    Omega-3 Fatty Acids (FISH OIL) 645 MG CAPS Take by mouth      simvastatin (ZOCOR) 20 mg tablet Take 1 tablet (20 mg total) by mouth daily 90 tablet 1     No current facility-administered medications for this visit  She has No Known Allergies       Review of Systems   Constitutional: Negative for activity change, appetite change and unexpected weight change  HENT: Negative for ear pain and sore throat  Eyes: Negative for visual disturbance  Respiratory: Negative for cough, shortness of breath and wheezing  Cardiovascular: Negative for chest pain and leg swelling  Gastrointestinal: Negative for abdominal pain, blood in stool, constipation, diarrhea, nausea and vomiting  Genitourinary: Negative for difficulty urinating  Musculoskeletal: Negative for arthralgias and myalgias  Skin: Negative for rash     Neurological: Negative for dizziness, syncope, light-headedness and headaches  Psychiatric/Behavioral: Negative for self-injury, sleep disturbance and suicidal ideas  The patient is not nervous/anxious  Objective:        Physical Exam  Vitals and nursing note reviewed  Constitutional:       General: She is not in acute distress  Appearance: Normal appearance  She is well-developed  She is not diaphoretic  HENT:      Head: Normocephalic and atraumatic  Right Ear: Tympanic membrane, ear canal and external ear normal       Left Ear: Tympanic membrane, ear canal and external ear normal       Mouth/Throat:      Pharynx: No posterior oropharyngeal erythema  Eyes:      Conjunctiva/sclera: Conjunctivae normal       Pupils: Pupils are equal, round, and reactive to light  Neck:      Thyroid: No thyromegaly  Vascular: No carotid bruit  Cardiovascular:      Rate and Rhythm: Normal rate and regular rhythm  Heart sounds: Normal heart sounds  No murmur heard  No friction rub  No gallop  Pulmonary:      Effort: Pulmonary effort is normal  No respiratory distress  Breath sounds: Normal breath sounds  No wheezing  Abdominal:      General: Bowel sounds are normal  There is no distension  Palpations: Abdomen is soft  There is no mass  Tenderness: There is no abdominal tenderness  Musculoskeletal:      Right lower leg: No edema  Left lower leg: No edema  Lymphadenopathy:      Cervical: No cervical adenopathy  Skin:     General: Skin is warm and dry  Findings: No erythema or rash  Neurological:      General: No focal deficit present  Mental Status: She is alert and oriented to person, place, and time  Psychiatric:         Behavior: Behavior normal          Thought Content:  Thought content normal          Judgment: Judgment normal

## 2022-10-08 DIAGNOSIS — E78.5 HYPERLIPIDEMIA, UNSPECIFIED HYPERLIPIDEMIA TYPE: ICD-10-CM

## 2022-10-08 DIAGNOSIS — I10 ESSENTIAL HYPERTENSION: ICD-10-CM

## 2022-10-10 RX ORDER — SIMVASTATIN 20 MG
TABLET ORAL
Qty: 90 TABLET | Refills: 1 | Status: SHIPPED | OUTPATIENT
Start: 2022-10-10

## 2022-10-10 RX ORDER — LISINOPRIL AND HYDROCHLOROTHIAZIDE 12.5; 1 MG/1; MG/1
TABLET ORAL
Qty: 90 TABLET | Refills: 1 | Status: SHIPPED | OUTPATIENT
Start: 2022-10-10

## 2022-12-07 DIAGNOSIS — E03.9 HYPOTHYROIDISM, UNSPECIFIED TYPE: ICD-10-CM

## 2022-12-07 RX ORDER — LEVOTHYROXINE SODIUM 88 UG/1
TABLET ORAL
Qty: 90 TABLET | Refills: 1 | Status: SHIPPED | OUTPATIENT
Start: 2022-12-07

## 2022-12-08 ENCOUNTER — HOSPITAL ENCOUNTER (OUTPATIENT)
Dept: RADIOLOGY | Age: 72
Discharge: HOME | End: 2022-12-08

## 2022-12-08 DIAGNOSIS — M85.852 OSTEOPENIA OF LEFT HIP: ICD-10-CM

## 2022-12-20 ENCOUNTER — APPOINTMENT (OUTPATIENT)
Dept: LAB | Facility: CLINIC | Age: 72
End: 2022-12-20

## 2022-12-20 DIAGNOSIS — I10 BENIGN ESSENTIAL HYPERTENSION: ICD-10-CM

## 2022-12-20 DIAGNOSIS — E03.9 HYPOTHYROIDISM, UNSPECIFIED TYPE: ICD-10-CM

## 2022-12-20 DIAGNOSIS — E78.2 MIXED HYPERLIPIDEMIA: ICD-10-CM

## 2022-12-20 LAB
ALBUMIN SERPL BCP-MCNC: 3.5 G/DL (ref 3.5–5)
ALP SERPL-CCNC: 52 U/L (ref 46–116)
ALT SERPL W P-5'-P-CCNC: 19 U/L (ref 12–78)
ANION GAP SERPL CALCULATED.3IONS-SCNC: 5 MMOL/L (ref 4–13)
AST SERPL W P-5'-P-CCNC: 13 U/L (ref 5–45)
BILIRUB SERPL-MCNC: 0.59 MG/DL (ref 0.2–1)
BUN SERPL-MCNC: 18 MG/DL (ref 5–25)
CALCIUM SERPL-MCNC: 9.4 MG/DL (ref 8.3–10.1)
CHLORIDE SERPL-SCNC: 107 MMOL/L (ref 96–108)
CHOLEST SERPL-MCNC: 157 MG/DL
CO2 SERPL-SCNC: 28 MMOL/L (ref 21–32)
CREAT SERPL-MCNC: 1.05 MG/DL (ref 0.6–1.3)
GFR SERPL CREATININE-BSD FRML MDRD: 53 ML/MIN/1.73SQ M
GLUCOSE P FAST SERPL-MCNC: 105 MG/DL (ref 65–99)
HDLC SERPL-MCNC: 45 MG/DL
LDLC SERPL CALC-MCNC: 79 MG/DL (ref 0–100)
NONHDLC SERPL-MCNC: 112 MG/DL
POTASSIUM SERPL-SCNC: 4.4 MMOL/L (ref 3.5–5.3)
PROT SERPL-MCNC: 7.4 G/DL (ref 6.4–8.4)
SODIUM SERPL-SCNC: 140 MMOL/L (ref 135–147)
T3 SERPL-MCNC: 1.1 NG/ML (ref 0.6–1.8)
T4 SERPL-MCNC: 10.4 UG/DL (ref 4.7–13.3)
TRIGL SERPL-MCNC: 164 MG/DL
TSH SERPL DL<=0.05 MIU/L-ACNC: 1.21 UIU/ML (ref 0.45–4.5)

## 2022-12-30 ENCOUNTER — OFFICE VISIT (OUTPATIENT)
Dept: FAMILY MEDICINE CLINIC | Facility: CLINIC | Age: 72
End: 2022-12-30

## 2022-12-30 VITALS
TEMPERATURE: 97.6 F | OXYGEN SATURATION: 99 % | HEIGHT: 62 IN | BODY MASS INDEX: 33.49 KG/M2 | DIASTOLIC BLOOD PRESSURE: 84 MMHG | WEIGHT: 182 LBS | SYSTOLIC BLOOD PRESSURE: 134 MMHG | HEART RATE: 99 BPM

## 2022-12-30 DIAGNOSIS — Z12.31 ENCOUNTER FOR SCREENING MAMMOGRAM FOR MALIGNANT NEOPLASM OF BREAST: ICD-10-CM

## 2022-12-30 DIAGNOSIS — E03.9 HYPOTHYROIDISM, UNSPECIFIED TYPE: ICD-10-CM

## 2022-12-30 DIAGNOSIS — I10 BENIGN ESSENTIAL HYPERTENSION: ICD-10-CM

## 2022-12-30 DIAGNOSIS — E78.2 MIXED HYPERLIPIDEMIA: ICD-10-CM

## 2022-12-30 DIAGNOSIS — Z00.00 MEDICARE ANNUAL WELLNESS VISIT, SUBSEQUENT: Primary | ICD-10-CM

## 2022-12-30 DIAGNOSIS — R42 VERTIGO: ICD-10-CM

## 2022-12-30 DIAGNOSIS — N18.30 STAGE 3 CHRONIC KIDNEY DISEASE, UNSPECIFIED WHETHER STAGE 3A OR 3B CKD (HCC): ICD-10-CM

## 2022-12-30 RX ORDER — MECLIZINE HYDROCHLORIDE 25 MG/1
25 TABLET ORAL EVERY 8 HOURS PRN
Qty: 30 TABLET | Refills: 0 | Status: SHIPPED | OUTPATIENT
Start: 2022-12-30

## 2022-12-30 NOTE — PATIENT INSTRUCTIONS
Medicare Preventive Visit Patient Instructions  Thank you for completing your Welcome to Medicare Visit or Medicare Annual Wellness Visit today  Your next wellness visit will be due in one year (12/31/2023)  The screening/preventive services that you may require over the next 5-10 years are detailed below  Some tests may not apply to you based off risk factors and/or age  Screening tests ordered at today's visit but not completed yet may show as past due  Also, please note that scanned in results may not display below  Preventive Screenings:  Service Recommendations Previous Testing/Comments   Colorectal Cancer Screening  * Colonoscopy    * Fecal Occult Blood Test (FOBT)/Fecal Immunochemical Test (FIT)  * Fecal DNA/Cologuard Test  * Flexible Sigmoidoscopy Age: 39-70 years old   Colonoscopy: every 10 years (may be performed more frequently if at higher risk)  OR  FOBT/FIT: every 1 year  OR  Cologuard: every 3 years  OR  Sigmoidoscopy: every 5 years  Screening may be recommended earlier than age 39 if at higher risk for colorectal cancer  Also, an individualized decision between you and your healthcare provider will decide whether screening between the ages of 74-80 would be appropriate  Colonoscopy: 05/13/2011  FOBT/FIT: Not on file  Cologuard: Not on file  Sigmoidoscopy: Not on file          Breast Cancer Screening Age: 36 years old  Frequency: every 1-2 years  Not required if history of left and right mastectomy Mammogram: 10/04/2021    Screening Current   Cervical Cancer Screening Between the ages of 21-29, pap smear recommended once every 3 years  Between the ages of 33-67, can perform pap smear with HPV co-testing every 5 years     Recommendations may differ for women with a history of total hysterectomy, cervical cancer, or abnormal pap smears in past  Pap Smear: Not on file    Screening Not Indicated   Hepatitis C Screening Once for adults born between 1945 and 1965  More frequently in patients at high risk for Hepatitis C Hep C Antibody: Not on file        Diabetes Screening 1-2 times per year if you're at risk for diabetes or have pre-diabetes Fasting glucose: 105 mg/dL (12/20/2022)  A1C: 6 0 % (12/10/2020)  Screening Current   Cholesterol Screening Once every 5 years if you don't have a lipid disorder  May order more often based on risk factors  Lipid panel: 12/20/2022    Screening Not Indicated  History Lipid Disorder     Other Preventive Screenings Covered by Medicare:  1  Abdominal Aortic Aneurysm (AAA) Screening: covered once if your at risk  You're considered to be at risk if you have a family history of AAA  2  Lung Cancer Screening: covers low dose CT scan once per year if you meet all of the following conditions: (1) Age 50-69; (2) No signs or symptoms of lung cancer; (3) Current smoker or have quit smoking within the last 15 years; (4) You have a tobacco smoking history of at least 20 pack years (packs per day multiplied by number of years you smoked); (5) You get a written order from a healthcare provider  3  Glaucoma Screening: covered annually if you're considered high risk: (1) You have diabetes OR (2) Family history of glaucoma OR (3)  aged 48 and older OR (3)  American aged 72 and older  3  Osteoporosis Screening: covered every 2 years if you meet one of the following conditions: (1) You're estrogen deficient and at risk for osteoporosis based off medical history and other findings; (2) Have a vertebral abnormality; (3) On glucocorticoid therapy for more than 3 months; (4) Have primary hyperparathyroidism; (5) On osteoporosis medications and need to assess response to drug therapy  · Last bone density test (DXA Scan): 12/08/2022   5  HIV Screening: covered annually if you're between the age of 15-65  Also covered annually if you are younger than 13 and older than 72 with risk factors for HIV infection   For pregnant patients, it is covered up to 3 times per pregnancy  Immunizations:  Immunization Recommendations   Influenza Vaccine Annual influenza vaccination during flu season is recommended for all persons aged >= 6 months who do not have contraindications   Pneumococcal Vaccine   * Pneumococcal conjugate vaccine = PCV13 (Prevnar 13), PCV15 (Vaxneuvance), PCV20 (Prevnar 20)  * Pneumococcal polysaccharide vaccine = PPSV23 (Pneumovax) Adults 25-60 years old: 1-3 doses may be recommended based on certain risk factors  Adults 72 years old: 1-2 doses may be recommended based off what pneumonia vaccine you previously received   Hepatitis B Vaccine 3 dose series if at intermediate or high risk (ex: diabetes, end stage renal disease, liver disease)   Tetanus (Td) Vaccine - COST NOT COVERED BY MEDICARE PART B Following completion of primary series, a booster dose should be given every 10 years to maintain immunity against tetanus  Td may also be given as tetanus wound prophylaxis  Tdap Vaccine - COST NOT COVERED BY MEDICARE PART B Recommended at least once for all adults  For pregnant patients, recommended with each pregnancy  Shingles Vaccine (Shingrix) - COST NOT COVERED BY MEDICARE PART B  2 shot series recommended in those aged 48 and above     Health Maintenance Due:      Topic Date Due   • Hepatitis C Screening  Never done   • Colorectal Cancer Screening  05/13/2021   • Breast Cancer Screening: Mammogram  10/04/2022   • DXA SCAN  12/08/2024     Immunizations Due:      Topic Date Due   • Hepatitis B Vaccine (1 of 3 - 3-dose series) Never done   • COVID-19 Vaccine (3 - Booster for Darryle Clink series) 05/21/2021     Advance Directives   What are advance directives? Advance directives are legal documents that state your wishes and plans for medical care  These plans are made ahead of time in case you lose your ability to make decisions for yourself  Advance directives can apply to any medical decision, such as the treatments you want, and if you want to donate organs  What are the types of advance directives? There are many types of advance directives, and each state has rules about how to use them  You may choose a combination of any of the following:  · Living will: This is a written record of the treatment you want  You can also choose which treatments you do not want, which to limit, and which to stop at a certain time  This includes surgery, medicine, IV fluid, and tube feedings  · Durable power of  for healthcare [facility]): This is a written record that states who you want to make healthcare choices for you when you are unable to make them for yourself  This person, called a proxy, is usually a family member or a friend  You may choose more than 1 proxy  · Do not resuscitate (DNR) order:  A DNR order is used in case your heart stops beating or you stop breathing  It is a request not to have certain forms of treatment, such as CPR  A DNR order may be included in other types of advance directives  · Medical directive: This covers the care that you want if you are in a coma, near death, or unable to make decisions for yourself  You can list the treatments you want for each condition  Treatment may include pain medicine, surgery, blood transfusions, dialysis, IV or tube feedings, and a ventilator (breathing machine)  · Values history: This document has questions about your views, beliefs, and how you feel and think about life  This information can help others choose the care that you would choose  Why are advance directives important? An advance directive helps you control your care  Although spoken wishes may be used, it is better to have your wishes written down  Spoken wishes can be misunderstood, or not followed  Treatments may be given even if you do not want them  An advance directive may make it easier for your family to make difficult choices about your care     Urinary Incontinence   Urinary incontinence (UI)  is when you lose control of your bladder  UI develops because your bladder cannot store or empty urine properly  The 3 most common types of UI are stress incontinence, urge incontinence, or both  Medicines:   · May be given to help strengthen your bladder control  Report any side effects of medication to your healthcare provider  Do pelvic muscle exercises often:  Your pelvic muscles help you stop urinating  Squeeze these muscles tight for 5 seconds, then relax for 5 seconds  Gradually work up to squeezing for 10 seconds  Do 3 sets of 15 repetitions a day, or as directed  This will help strengthen your pelvic muscles and improve bladder control  Train your bladder:  Go to the bathroom at set times, such as every 2 hours, even if you do not feel the urge to go  You can also try to hold your urine when you feel the urge to go  For example, hold your urine for 5 minutes when you feel the urge to go  As that becomes easier, hold your urine for 10 minutes  Self-care:   · Keep a UI record  Write down how often you leak urine and how much you leak  Make a note of what you were doing when you leaked urine  · Drink liquids as directed  You may need to limit the amount of liquid you drink to help control your urine leakage  Do not drink any liquid right before you go to bed  Limit or do not have drinks that contain caffeine or alcohol  · Prevent constipation  Eat a variety of high-fiber foods  Good examples are high-fiber cereals, beans, vegetables, and whole-grain breads  Walking is the best way to trigger your intestines to have a bowel movement  · Exercise regularly and maintain a healthy weight  Weight loss and exercise will decrease pressure on your bladder and help you control your leakage  · Use a catheter as directed  to help empty your bladder  A catheter is a tiny, plastic tube that is put into your bladder to drain your urine  · Go to behavior therapy as directed    Behavior therapy may be used to help you learn to control your urge to urinate  Weight Management   Why it is important to manage your weight:  Being overweight increases your risk of health conditions such as heart disease, high blood pressure, type 2 diabetes, and certain types of cancer  It can also increase your risk for osteoarthritis, sleep apnea, and other respiratory problems  Aim for a slow, steady weight loss  Even a small amount of weight loss can lower your risk of health problems  How to lose weight safely:  A safe and healthy way to lose weight is to eat fewer calories and get regular exercise  You can lose up about 1 pound a week by decreasing the number of calories you eat by 500 calories each day  Healthy meal plan for weight management:  A healthy meal plan includes a variety of foods, contains fewer calories, and helps you stay healthy  A healthy meal plan includes the following:  · Eat whole-grain foods more often  A healthy meal plan should contain fiber  Fiber is the part of grains, fruits, and vegetables that is not broken down by your body  Whole-grain foods are healthy and provide extra fiber in your diet  Some examples of whole-grain foods are whole-wheat breads and pastas, oatmeal, brown rice, and bulgur  · Eat a variety of vegetables every day  Include dark, leafy greens such as spinach, kale, omar greens, and mustard greens  Eat yellow and orange vegetables such as carrots, sweet potatoes, and winter squash  · Eat a variety of fruits every day  Choose fresh or canned fruit (canned in its own juice or light syrup) instead of juice  Fruit juice has very little or no fiber  · Eat low-fat dairy foods  Drink fat-free (skim) milk or 1% milk  Eat fat-free yogurt and low-fat cottage cheese  Try low-fat cheeses such as mozzarella and other reduced-fat cheeses  · Choose meat and other protein foods that are low in fat  Choose beans or other legumes such as split peas or lentils   Choose fish, skinless poultry (chicken or turkey), or lean cuts of red meat (beef or pork)  Before you cook meat or poultry, cut off any visible fat  · Use less fat and oil  Try baking foods instead of frying them  Add less fat, such as margarine, sour cream, regular salad dressing and mayonnaise to foods  Eat fewer high-fat foods  Some examples of high-fat foods include french fries, doughnuts, ice cream, and cakes  · Eat fewer sweets  Limit foods and drinks that are high in sugar  This includes candy, cookies, regular soda, and sweetened drinks  Exercise:  Exercise at least 30 minutes per day on most days of the week  Some examples of exercise include walking, biking, dancing, and swimming  You can also fit in more physical activity by taking the stairs instead of the elevator or parking farther away from stores  Ask your healthcare provider about the best exercise plan for you  © Copyright [employer] 2018 Information is for End User's use only and may not be sold, redistributed or otherwise used for commercial purposes   All illustrations and images included in CareNotes® are the copyrighted property of [employer]  or [address] [place] [address]

## 2022-12-30 NOTE — PROGRESS NOTES
Assessment and Plan:     Problem List Items Addressed This Visit        Endocrine    Hypothyroidism    Relevant Orders    TSH, 3rd generation       Cardiovascular and Mediastinum    Benign essential hypertension    Relevant Orders    Comprehensive metabolic panel    Lipid panel       Genitourinary    Stage 3 chronic kidney disease, unspecified whether stage 3a or 3b CKD ([facility] [address] )       Other    Hyperlipidemia    Relevant Orders    Comprehensive metabolic panel    Screening for breast cancer    Relevant Orders    Mammo screening bilateral w 3d & cad   Other Visit Diagnoses     Medicare annual wellness visit, subsequent    -  Primary    Vertigo        Stable using as needed meclizine    Relevant Medications    meclizine (ANTIVERT) 25 mg tablet          Depression Screening and Follow-up Plan: Patient was screened for depression during today's encounter  They screened negative with a PHQ-2 score of 0  Urinary Incontinence Plan of Care: counseling topics discussed: practice Kegel (pelvic floor strengthening) exercises and use restroom every 2 hours  Urgency  Stress incontinence  Preventive health issues were discussed with patient, and age appropriate screening tests were ordered as noted in patient's After Visit Summary  Personalized health advice and appropriate referrals for health education or preventive services given if needed, as noted in patient's After Visit Summary  History of Present Illness:     Patient presents for a Medicare Wellness Visit    Patient presents in the office for follow-up chronic conditions  Patient has hypertension with chronic kidney disease stage III  Pressure is well controlled with lisinopril-HCTZ 10-12 0 5  Hyperlipidemia she is on simvastatin 20 mg  Also has hypothyroidism on levothyroxine 88 mcg  TSH T3 and T4 are within normal range  Fasting blood sugar is 105  Her BUN and creatinine are normal and her GFR is stable at 55    Panel is good triglycerides are mildly elevated  Continue diet and exercise  Due for routine mammogram and colonoscopy  Patient Care Team:  Annalisa Huizar PA-C as PCP - General (Family Medicine)  Annalisa Huizar PA-C     Review of Systems:     Review of Systems   Constitutional: Negative for activity change and unexpected weight change  HENT: Negative for ear pain and sore throat  Eyes: Negative for visual disturbance  Respiratory: Positive for cough  Negative for shortness of breath and wheezing  Cardiovascular: Negative for chest pain and leg swelling  Gastrointestinal: Negative for abdominal pain, blood in stool, constipation, diarrhea, nausea and vomiting  Genitourinary: Positive for urgency  Negative for difficulty urinating  Musculoskeletal: Negative for arthralgias and myalgias  Skin: Negative for rash  Neurological: Negative for dizziness, syncope, light-headedness and headaches  Psychiatric/Behavioral: Negative for self-injury, sleep disturbance and suicidal ideas  The patient is not nervous/anxious           Problem List:     Patient Active Problem List   Diagnosis   • Benign essential hypertension   • Hyperlipidemia   • Hypothyroidism   • Osteopenia   • Vitamin D deficiency   • Screening for breast cancer   • Renal insufficiency   • Sciatica of right side   • Stage 3 chronic kidney disease, unspecified whether stage 3a or 3b CKD ([facility] [address] )      Past Medical and Surgical History:     Past Medical History:   Diagnosis Date   • Hyperlipidemia    • Hypertension      Past Surgical History:   Procedure Laterality Date   • TUBAL LIGATION        Family History:     Family History   Problem Relation Age of Onset   • Diabetes Mother         DM   • No Known Problems Father    • Leukemia Sister 79   • Lymphoma Sister 54   • No Known Problems Daughter    • No Known Problems Sister    • No Known Problems Sister    • No Known Problems Maternal Grandmother    • No Known Problems Maternal Grandfather    • No Known Problems Paternal Grandmother    • No Known Problems Paternal Grandfather    • No Known Problems Son       Social History:     Social History     Socioeconomic History   • Marital status: /Civil Union     Spouse name: None   • Number of children: None   • Years of education: None   • Highest education level: None   Occupational History   • None   Tobacco Use   • Smoking status: Never   • Smokeless tobacco: Never   Vaping Use   • Vaping Use: Never used   Substance and Sexual Activity   • Alcohol use: Yes     Comment: rare   • Drug use: No   • Sexual activity: None   Other Topics Concern   • None   Social History Narrative   • None     Social Determinants of Health     Financial Resource Strain: Low Risk    • Difficulty of Paying Living Expenses: Not hard at all   Food Insecurity: Not on file   Transportation Needs: No Transportation Needs   • Lack of Transportation (Medical): No   • Lack of Transportation (Non-Medical): No   Physical Activity: Not on file   Stress: Not on file   Social Connections: Not on file   Intimate Partner Violence: Not on file   Housing Stability: Not on file      Medications and Allergies:     Current Outpatient Medications   Medication Sig Dispense Refill   • meclizine (ANTIVERT) 25 mg tablet Take 1 tablet (25 mg total) by mouth every 8 (eight) hours as needed for dizziness 30 tablet 0   • Calcium Citrate-Vitamin D 315-250 MG-UNIT TABS Take by mouth     • Cholecalciferol (VITAMIN D3) 1000 units CAPS Take 1 capsule by mouth daily     • coenzyme Q-10 100 MG capsule Take by mouth     • levothyroxine 88 mcg tablet TAKE 1 TABLET BY MOUTH EVERY DAY 90 tablet 1   • lisinopril-hydrochlorothiazide (PRINZIDE,ZESTORETIC) 10-12 5 MG per tablet TAKE 1 TABLET BY MOUTH EVERY DAY 90 tablet 1   • Omega-3 Fatty Acids (FISH OIL) 645 MG CAPS Take by mouth     • simvastatin (ZOCOR) 20 mg tablet TAKE 1 TABLET BY MOUTH EVERY DAY 90 tablet 1     No current facility-administered medications for this visit       No Known Allergies   Immunizations:     Immunization History   Administered Date(s) Administered   • COVID-19 MODERNA VACC 0 5 ML IM 02/26/2021, 03/26/2021   • INFLUENZA 09/25/2013, 09/30/2015, 10/28/2016, 10/26/2017, 10/21/2019   • Influenza Quadrivalent Preservative Free 3 years and older IM 09/30/2015, 10/28/2016   • Influenza Split High Dose Preservative Free IM 10/26/2017, 10/21/2019   • Influenza, high dose seasonal 0 7 mL 10/02/2018, 09/17/2020   • Influenza, seasonal, injectable, preservative free 09/21/2012, 09/25/2013, 09/29/2014   • Pneumococcal Conjugate 13-Valent 10/21/2019   • Pneumococcal Polysaccharide PPV23 10/26/2017      Health Maintenance:         Topic Date Due   • Hepatitis C Screening  Never done   • Colorectal Cancer Screening  05/13/2021   • Breast Cancer Screening: Mammogram  10/04/2022   • DXA SCAN  12/08/2024         Topic Date Due   • Hepatitis B Vaccine (1 of 3 - 3-dose series) Never done   • COVID-19 Vaccine (3 - Booster for Acosta Mom series) 05/21/2021      Medicare Screening Tests and Risk Assessments:     Izzy Eason is here for her Subsequent Wellness visit  Health Risk Assessment:   Patient rates overall health as good  Patient feels that their physical health rating is same  Patient is satisfied with their life  Eyesight was rated as same  Hearing was rated as same  Patient feels that their emotional and mental health rating is same  Patients states they are never, rarely angry  Patient states they are never, rarely unusually tired/fatigued  Pain experienced in the last 7 days has been none  Patient states that she has experienced no weight loss or gain in last 6 months  Depression Screening:   PHQ-2 Score: 0      Fall Risk Screening: In the past year, patient has experienced: no history of falling in past year      Urinary Incontinence Screening:   Patient has leaked urine accidently in the last six months       Home Safety:  Patient does not have trouble with stairs inside or outside of their home  Patient has working smoke alarms and has working carbon monoxide detector  Home safety hazards include: none  Nutrition:   Current diet is Regular  Medications:   Patient is currently taking over-the-counter supplements  OTC medications include: see medication list  Patient is able to manage medications  Activities of Daily Living (ADLs)/Instrumental Activities of Daily Living (IADLs):   Walk and transfer into and out of bed and chair?: Yes  Dress and groom yourself?: Yes    Bathe or shower yourself?: Yes    Feed yourself? Yes  Do your laundry/housekeeping?: Yes  Manage your money, pay your bills and track your expenses?: Yes  Make your own meals?: Yes    Do your own shopping?: Yes    Previous Hospitalizations:   Any hospitalizations or ED visits within the last 12 months?: No      Advance Care Planning:   Living will: No      Cognitive Screening:   Provider or family/friend/caregiver concerned regarding cognition?: No    PREVENTIVE SCREENINGS      Cardiovascular Screening:    General: History Lipid Disorder and Screening Current      Diabetes Screening:     General: Screening Current      Colorectal Cancer Screening:       Due for: Colonoscopy - Low Risk      Breast Cancer Screening:     General: Screening Current    Due for: Mammogram        Cervical Cancer Screening:    General: Screening Not Indicated      Osteoporosis Screening:    General: Screening Current      Lung Cancer Screening:     General: Screening Not Indicated    Screening, Brief Intervention, and Referral to Treatment (SBIRT)    Screening  Typical number of drinks in a day: 0  Typical number of drinks in a week: 0  Interpretation: Low risk drinking behavior      Single Item Drug Screening:  How often have you used an illegal drug (including marijuana) or a prescription medication for non-medical reasons in the past year? never    Single Item Drug Screen Score: 0  Interpretation: Negative screen for possible drug use disorder    Brief Intervention  Alcohol & drug use screenings were reviewed  No concerns regarding substance use disorder identified  No results found  Physical Exam:     /84 (BP Location: Right arm, Patient Position: Sitting, Cuff Size: Large)   Pulse 99   Temp 97 6 °F (36 4 °C) (Temporal)   Ht 5' 2 25" (1 581 m)   Wt 82 6 kg (182 lb)   SpO2 99%   BMI 33 02 kg/m²     Physical Exam  Vitals and nursing note reviewed  Constitutional:       General: She is not in acute distress  Appearance: Normal appearance  She is well-developed  She is not diaphoretic  HENT:      Head: Normocephalic and atraumatic  Right Ear: Tympanic membrane and external ear normal       Left Ear: Tympanic membrane, ear canal and external ear normal       Nose: Nose normal       Mouth/Throat:      Pharynx: No posterior oropharyngeal erythema  Eyes:      Conjunctiva/sclera: Conjunctivae normal       Pupils: Pupils are equal, round, and reactive to light  Neck:      Thyroid: No thyromegaly  Cardiovascular:      Rate and Rhythm: Normal rate and regular rhythm  Heart sounds: Normal heart sounds  No murmur heard  No friction rub  Pulmonary:      Effort: Pulmonary effort is normal  No respiratory distress  Breath sounds: Normal breath sounds  No wheezing  Abdominal:      General: Bowel sounds are normal  There is no distension  Palpations: Abdomen is soft  There is no mass  Tenderness: There is no abdominal tenderness  Musculoskeletal:      Right lower leg: No edema  Left lower leg: No edema  Lymphadenopathy:      Cervical: No cervical adenopathy  Skin:     General: Skin is warm and dry  Neurological:      General: No focal deficit present  Mental Status: She is alert and oriented to person, place, and time  Psychiatric:         Mood and Affect: Mood normal          Behavior: Behavior normal          Thought Content:  Thought content normal          Judgment: Judgment normal           Rosemary Decker PA-C

## 2023-02-21 ENCOUNTER — OFFICE VISIT (OUTPATIENT)
Dept: FAMILY MEDICINE CLINIC | Facility: CLINIC | Age: 73
End: 2023-02-21

## 2023-02-21 VITALS
HEART RATE: 87 BPM | OXYGEN SATURATION: 96 % | TEMPERATURE: 97.8 F | SYSTOLIC BLOOD PRESSURE: 134 MMHG | DIASTOLIC BLOOD PRESSURE: 86 MMHG | BODY MASS INDEX: 33.49 KG/M2 | WEIGHT: 182 LBS | HEIGHT: 62 IN

## 2023-02-21 DIAGNOSIS — K59.00 CONSTIPATION, UNSPECIFIED CONSTIPATION TYPE: Primary | ICD-10-CM

## 2023-02-21 DIAGNOSIS — R19.4 CHANGE IN BOWEL HABIT: ICD-10-CM

## 2023-02-21 RX ORDER — DOCUSATE SODIUM 100 MG/1
100 CAPSULE, LIQUID FILLED ORAL 2 TIMES DAILY
COMMUNITY

## 2023-02-21 NOTE — PROGRESS NOTES
Assessment/Plan:     Diagnoses and all orders for this visit:    Constipation, unspecified constipation type  Comments:  Patient will start over-the-counter Colace twice daily  Increase fluid and fiber  Miralax daily until bowel movement  Orders:  -     Ambulatory Referral to Gastroenterology; Future    Change in bowel habit  Comments:  Refer to GI to update colonoscopy  Orders:  -     Ambulatory Referral to Gastroenterology; Future    Other orders  -     docusate sodium (COLACE) 100 mg capsule; Take 100 mg by mouth 2 (two) times a day  -     Polyethylene Glycol 3350 (MIRALAX PO); Take by mouth          Subjective:      Patient ID: Kiel Obrien is a 81 y o  female  Patient presents in the office with lower abdominal pain for about a week  Has now since developed constipation  Is been having small hard bowel movements  Tonight she had 2 small movements this morning another small amount  Fever  She had 1 episode of nausea but she has had no vomiting  Appetite is good and she has had no weight loss  No  Over-the-counter meds taken   Colonoscopy  Not  Up  To  date      The following portions of the patient's history were reviewed and updated as appropriate:   She   Patient Active Problem List    Diagnosis Date Noted   • Stage 3 chronic kidney disease, unspecified whether stage 3a or 3b CKD ([facility] [address] ) 12/30/2022   • Sciatica of right side 06/09/2020   • Renal insufficiency 01/28/2020   • Screening for breast cancer 09/28/2018   • Vitamin D deficiency 09/25/2013   • Benign essential hypertension 06/18/2012   • Hyperlipidemia 06/18/2012   • Hypothyroidism 06/18/2012   • Osteopenia 06/18/2012     Current Outpatient Medications   Medication Sig Dispense Refill   • Calcium Citrate-Vitamin D 315-250 MG-UNIT TABS Take by mouth     • Cholecalciferol (VITAMIN D3) 1000 units CAPS Take 1 capsule by mouth daily     • coenzyme Q-10 100 MG capsule Take by mouth     • docusate sodium (COLACE) 100 mg capsule Take 100 mg by mouth 2 (two) times a day     • levothyroxine 88 mcg tablet TAKE 1 TABLET BY MOUTH EVERY DAY 90 tablet 1   • lisinopril-hydrochlorothiazide (PRINZIDE,ZESTORETIC) 10-12 5 MG per tablet TAKE 1 TABLET BY MOUTH EVERY DAY 90 tablet 1   • meclizine (ANTIVERT) 25 mg tablet Take 1 tablet (25 mg total) by mouth every 8 (eight) hours as needed for dizziness 30 tablet 0   • Omega-3 Fatty Acids (FISH OIL) 645 MG CAPS Take by mouth     • Polyethylene Glycol 3350 (MIRALAX PO) Take by mouth     • simvastatin (ZOCOR) 20 mg tablet TAKE 1 TABLET BY MOUTH EVERY DAY 90 tablet 1     No current facility-administered medications for this visit  She has No Known Allergies       Review of Systems   Constitutional: Negative for activity change, appetite change, fever and unexpected weight change  Gastrointestinal: Positive for abdominal pain, constipation and nausea  Negative for blood in stool, diarrhea and rectal pain  Objective:        Physical Exam  Vitals and nursing note reviewed  Constitutional:       General: She is not in acute distress  Appearance: Normal appearance  She is well-developed  She is not ill-appearing  HENT:      Head: Normocephalic and atraumatic  Right Ear: External ear normal       Left Ear: External ear normal    Eyes:      General: No scleral icterus  Conjunctiva/sclera: Conjunctivae normal    Neck:      Vascular: No carotid bruit  Cardiovascular:      Rate and Rhythm: Normal rate and regular rhythm  Heart sounds: Normal heart sounds  No murmur heard  Pulmonary:      Effort: Pulmonary effort is normal       Breath sounds: Normal breath sounds  Abdominal:      General: Bowel sounds are normal       Palpations: Abdomen is soft  There is no mass  Tenderness: There is no abdominal tenderness  There is no guarding  Musculoskeletal:      Right lower leg: No edema  Left lower leg: No edema  Lymphadenopathy:      Cervical: No cervical adenopathy     Skin:     General: Skin is warm and dry  Coloration: Skin is not jaundiced  Neurological:      General: No focal deficit present  Mental Status: She is alert and oriented to person, place, and time  Psychiatric:         Mood and Affect: Mood normal          Behavior: Behavior normal          Thought Content:  Thought content normal          Judgment: Judgment normal

## 2023-03-03 ENCOUNTER — APPOINTMENT (EMERGENCY)
Dept: CT IMAGING | Facility: HOSPITAL | Age: 73
End: 2023-03-03

## 2023-03-03 ENCOUNTER — HOSPITAL ENCOUNTER (EMERGENCY)
Facility: HOSPITAL | Age: 73
Discharge: HOME | End: 2023-03-03
Attending: EMERGENCY MEDICINE

## 2023-03-03 ENCOUNTER — TELEPHONE (OUTPATIENT)
Dept: FAMILY MEDICINE CLINIC | Facility: CLINIC | Age: 73
End: 2023-03-03

## 2023-03-03 VITALS
HEART RATE: 67 BPM | SYSTOLIC BLOOD PRESSURE: 141 MMHG | OXYGEN SATURATION: 98 % | DIASTOLIC BLOOD PRESSURE: 85 MMHG | RESPIRATION RATE: 18 BRPM | TEMPERATURE: 98.6 F

## 2023-03-03 DIAGNOSIS — K76.89 LIVER CYST: ICD-10-CM

## 2023-03-03 DIAGNOSIS — R10.30 LOWER ABDOMINAL PAIN: Primary | ICD-10-CM

## 2023-03-03 DIAGNOSIS — M48.061 SPINAL STENOSIS AT L4-L5 LEVEL: ICD-10-CM

## 2023-03-03 LAB
ALBUMIN SERPL BCP-MCNC: 4.3 G/DL (ref 3.5–5)
ALP SERPL-CCNC: 60 U/L (ref 34–104)
ALT SERPL W P-5'-P-CCNC: 8 U/L (ref 7–52)
ANION GAP SERPL CALCULATED.3IONS-SCNC: 12 MMOL/L (ref 4–13)
AST SERPL W P-5'-P-CCNC: 12 U/L (ref 13–39)
BASOPHILS # BLD AUTO: 0.05 THOUSANDS/ÂΜL (ref 0–0.1)
BASOPHILS NFR BLD AUTO: 1 % (ref 0–1)
BILIRUB SERPL-MCNC: 0.46 MG/DL (ref 0.2–1)
BILIRUB UR QL STRIP: NEGATIVE
BUN SERPL-MCNC: 17 MG/DL (ref 5–25)
CALCIUM SERPL-MCNC: 9.6 MG/DL (ref 8.4–10.2)
CARDIAC TROPONIN I PNL SERPL HS: 4 NG/L
CHLORIDE SERPL-SCNC: 103 MMOL/L (ref 96–108)
CLARITY UR: CLEAR
CO2 SERPL-SCNC: 25 MMOL/L (ref 21–32)
COLOR UR: NORMAL
CREAT SERPL-MCNC: 0.94 MG/DL (ref 0.6–1.3)
EOSINOPHIL # BLD AUTO: 0.08 THOUSAND/ÂΜL (ref 0–0.61)
EOSINOPHIL NFR BLD AUTO: 1 % (ref 0–6)
ERYTHROCYTE [DISTWIDTH] IN BLOOD BY AUTOMATED COUNT: 13.5 % (ref 11.6–15.1)
GFR SERPL CREATININE-BSD FRML MDRD: 61 ML/MIN/1.73SQ M
GLUCOSE SERPL-MCNC: 100 MG/DL (ref 65–140)
GLUCOSE UR STRIP-MCNC: NEGATIVE MG/DL
HCT VFR BLD AUTO: 45.4 % (ref 34.8–46.1)
HGB BLD-MCNC: 14.9 G/DL (ref 11.5–15.4)
HGB UR QL STRIP.AUTO: NEGATIVE
IMM GRANULOCYTES # BLD AUTO: 0.03 THOUSAND/UL (ref 0–0.2)
IMM GRANULOCYTES NFR BLD AUTO: 0 % (ref 0–2)
KETONES UR STRIP-MCNC: NEGATIVE MG/DL
LEUKOCYTE ESTERASE UR QL STRIP: NEGATIVE
LIPASE SERPL-CCNC: 24 U/L (ref 11–82)
LYMPHOCYTES # BLD AUTO: 2.32 THOUSANDS/ÂΜL (ref 0.6–4.47)
LYMPHOCYTES NFR BLD AUTO: 23 % (ref 14–44)
MCH RBC QN AUTO: 29.3 PG (ref 26.8–34.3)
MCHC RBC AUTO-ENTMCNC: 32.8 G/DL (ref 31.4–37.4)
MCV RBC AUTO: 89 FL (ref 82–98)
MONOCYTES # BLD AUTO: 0.82 THOUSAND/ÂΜL (ref 0.17–1.22)
MONOCYTES NFR BLD AUTO: 8 % (ref 4–12)
NEUTROPHILS # BLD AUTO: 6.64 THOUSANDS/ÂΜL (ref 1.85–7.62)
NEUTS SEG NFR BLD AUTO: 67 % (ref 43–75)
NITRITE UR QL STRIP: NEGATIVE
NRBC BLD AUTO-RTO: 0 /100 WBCS
PH UR STRIP.AUTO: 7 [PH]
PLATELET # BLD AUTO: 373 THOUSANDS/UL (ref 149–390)
PMV BLD AUTO: 9.4 FL (ref 8.9–12.7)
POTASSIUM SERPL-SCNC: 3.9 MMOL/L (ref 3.5–5.3)
PROT SERPL-MCNC: 7.9 G/DL (ref 6.4–8.4)
PROT UR STRIP-MCNC: NEGATIVE MG/DL
RBC # BLD AUTO: 5.09 MILLION/UL (ref 3.81–5.12)
SODIUM SERPL-SCNC: 140 MMOL/L (ref 135–147)
SP GR UR STRIP.AUTO: 1.02 (ref 1–1.03)
UROBILINOGEN UR STRIP-ACNC: <2 MG/DL
WBC # BLD AUTO: 9.94 THOUSAND/UL (ref 4.31–10.16)

## 2023-03-03 RX ADMIN — IOHEXOL 100 ML: 350 INJECTION, SOLUTION INTRAVENOUS at 17:54

## 2023-03-03 NOTE — TELEPHONE ENCOUNTER
Spoke  With patient /  The  Constipation  Has  Resolved  With the  Colace and  Miralax  Last  bm  This  Am   No  Fever  Generalized abd  Pain   No N, V, D   Pt  Worried  She  Has  Cancer  Wants  To  Go t o  ER  Pt  [address] 34  ER

## 2023-03-03 NOTE — TELEPHONE ENCOUNTER
Patient called this morning and asked if you could call her back  She is having the stomach pain again that she just recently saw you for    Patient is looking for advice on what to do

## 2023-03-04 NOTE — ED PROVIDER NOTES
History  Chief Complaint   Patient presents with   • Abdominal Pain     Pt arrives c/o lower abd pain since last night, had similar pain 3 wks ago and was seen outpt  Denies fever  Hx of constipation but moved bowels this AM     68-year-old female with history of constipation presenting to the ED due to gradual onset lower abdominal pain since last night  Pain has been better today although still present  Patient states she had a similar episode 3 weeks ago, she followed up with her PCP, was prescribed stool softeners and MiraLAX which she has been taking  Has been having bowel movements every other day  Patient's main concern is colon cancer  No weight loss, appetite change, bloody stools  Denies N/V, CP, SOB, urinary sx's, URI sx's, fevers or chills  Prior to Admission Medications   Prescriptions Last Dose Informant Patient Reported? Taking?    Calcium Citrate-Vitamin D 315-250 MG-UNIT TABS   Yes No   Sig: Take by mouth   Cholecalciferol (VITAMIN D3) 1000 units CAPS   Yes No   Sig: Take 1 capsule by mouth daily   Omega-3 Fatty Acids (FISH OIL) 645 MG CAPS   Yes No   Sig: Take by mouth   Polyethylene Glycol 3350 (MIRALAX PO)   Yes No   Sig: Take by mouth   coenzyme Q-10 100 MG capsule   Yes No   Sig: Take by mouth   docusate sodium (COLACE) 100 mg capsule   Yes No   Sig: Take 100 mg by mouth 2 (two) times a day   levothyroxine 88 mcg tablet   No No   Sig: TAKE 1 TABLET BY MOUTH EVERY DAY   lisinopril-hydrochlorothiazide (PRINZIDE,ZESTORETIC) 10-12 5 MG per tablet   No No   Sig: TAKE 1 TABLET BY MOUTH EVERY DAY   meclizine (ANTIVERT) 25 mg tablet   No No   Sig: Take 1 tablet (25 mg total) by mouth every 8 (eight) hours as needed for dizziness   simvastatin (ZOCOR) 20 mg tablet   No No   Sig: TAKE 1 TABLET BY MOUTH EVERY DAY      Facility-Administered Medications: None       Past Medical History:   Diagnosis Date   • Hyperlipidemia    • Hypertension        Past Surgical History:   Procedure Laterality Date   • TUBAL LIGATION         Family History   Problem Relation Age of Onset   • Diabetes Mother         DM   • No Known Problems Father    • Leukemia Sister 79   • Lymphoma Sister 54   • No Known Problems Daughter    • No Known Problems Sister    • No Known Problems Sister    • No Known Problems Maternal Grandmother    • No Known Problems Maternal Grandfather    • No Known Problems Paternal Grandmother    • No Known Problems Paternal Grandfather    • No Known Problems Son      I have reviewed and agree with the history as documented  E-Cigarette/Vaping   • E-Cigarette Use Never User      E-Cigarette/Vaping Substances     Social History     Tobacco Use   • Smoking status: Never     Passive exposure: Past   • Smokeless tobacco: Never   Vaping Use   • Vaping Use: Never used   Substance Use Topics   • Alcohol use: Yes     Comment: rare   • Drug use: No        Review of Systems   Constitutional: Negative for chills and fever  HENT: Negative for ear pain and sore throat  Eyes: Negative for pain and visual disturbance  Respiratory: Negative for cough and shortness of breath  Cardiovascular: Negative for chest pain and palpitations  Gastrointestinal: Positive for abdominal pain and constipation  Negative for vomiting  Genitourinary: Negative for dysuria and hematuria  Musculoskeletal: Negative for arthralgias and back pain  Skin: Negative for color change and rash  Neurological: Negative for seizures and syncope  All other systems reviewed and are negative        Physical Exam  ED Triage Vitals   Temperature Pulse Respirations Blood Pressure SpO2   03/03/23 1359 03/03/23 1359 03/03/23 1359 03/03/23 1359 03/03/23 1359   98 6 °F (37 °C) 101 18 164/95 96 %      Temp Source Heart Rate Source Patient Position - Orthostatic VS BP Location FiO2 (%)   03/03/23 1359 03/03/23 1359 03/03/23 1359 03/03/23 1359 --   Oral Monitor Sitting Right arm       Pain Score       03/03/23 1954       No Pain Orthostatic Vital Signs  Vitals:    03/03/23 1359 03/03/23 1615 03/03/23 1730 03/03/23 1954   BP: 164/95 128/82 104/59 141/85   Pulse: 101 86 63 67   Patient Position - Orthostatic VS: Sitting   Sitting       Physical Exam  Vitals and nursing note reviewed  Constitutional:       General: She is not in acute distress  Appearance: She is well-developed  HENT:      Head: Normocephalic and atraumatic  Eyes:      Conjunctiva/sclera: Conjunctivae normal    Cardiovascular:      Rate and Rhythm: Normal rate and regular rhythm  Pulses:           Radial pulses are 2+ on the right side and 2+ on the left side  Heart sounds: Normal heart sounds, S1 normal and S2 normal  No murmur heard  Pulmonary:      Effort: Pulmonary effort is normal  No respiratory distress  Breath sounds: Normal breath sounds and air entry  Abdominal:      General: Bowel sounds are normal       Palpations: Abdomen is soft  Tenderness: There is no abdominal tenderness  Musculoskeletal:         General: No swelling  Cervical back: Neck supple  Right lower leg: No edema  Left lower leg: No edema  Skin:     General: Skin is warm and dry  Capillary Refill: Capillary refill takes less than 2 seconds  Neurological:      Mental Status: She is alert     Psychiatric:         Mood and Affect: Mood normal          ED Medications  Medications   iohexol (OMNIPAQUE) 350 MG/ML injection (SINGLE-DOSE) 100 mL (100 mL Intravenous Given 3/3/23 1754)       Diagnostic Studies  Results Reviewed     Procedure Component Value Units Date/Time    HS Troponin 0hr (reflex protocol) [[id number]]  (Normal) Collected: 03/03/23 1614    Lab Status: Final result Specimen: Blood from Arm, Left Updated: 03/03/23 1708     hs TnI 0hr 4 ng/L     Comprehensive metabolic panel [[id number]]  (Abnormal) Collected: 03/03/23 1614    Lab Status: Final result Specimen: Blood from Arm, Left Updated: 03/03/23 1653     Sodium 140 mmol/L Potassium 3 9 mmol/L      Chloride 103 mmol/L      CO2 25 mmol/L      ANION GAP 12 mmol/L      BUN 17 mg/dL      Creatinine 0 94 mg/dL      Glucose 100 mg/dL      Calcium 9 6 mg/dL      AST 12 U/L      ALT 8 U/L      Alkaline Phosphatase 60 U/L      Total Protein 7 9 g/dL      Albumin 4 3 g/dL      Total Bilirubin 0 46 mg/dL      eGFR 61 ml/min/1 73sq m     Narrative:      [place] guidelines for Chronic Kidney Disease (CKD):   •  Stage 1 with normal or high GFR (GFR > 90 mL/min/1 73 square meters)  •  Stage 2 Mild CKD (GFR = 60-89 mL/min/1 73 square meters)  •  Stage 3A Moderate CKD (GFR = 45-59 mL/min/1 73 square meters)  •  Stage 3B Moderate CKD (GFR = 30-44 mL/min/1 73 square meters)  •  Stage 4 Severe CKD (GFR = 15-29 mL/min/1 73 square meters)  •  Stage 5 End Stage CKD (GFR <15 mL/min/1 73 square meters)  Note: GFR calculation is accurate only with a steady state creatinine    Lipase [[id number]]  (Normal) Collected: 03/03/23 1614    Lab Status: Final result Specimen: Blood from Arm, Left Updated: 03/03/23 1653     Lipase 24 u/L     UA w Reflex to Microscopic w Reflex to Culture [[id number]] Collected: 03/03/23 1616    Lab Status: Final result Specimen: Urine, Clean Catch Updated: 03/03/23 1639     Color, UA Light Yellow     Clarity, UA Clear     Specific Gravity, UA 1 017     pH, UA 7 0     Leukocytes, UA Negative     Nitrite, UA Negative     Protein, UA Negative mg/dl      Glucose, UA Negative mg/dl      Ketones, UA Negative mg/dl      Urobilinogen, UA <2 0 mg/dl      Bilirubin, UA Negative     Occult Blood, UA Negative    CBC and differential [[id number]] Collected: 03/03/23 1614    Lab Status: Final result Specimen: Blood from Arm, Left Updated: 03/03/23 1627     WBC 9 94 Thousand/uL      RBC 5 09 Million/uL      Hemoglobin 14 9 g/dL      Hematocrit 45 4 %      MCV 89 fL      MCH 29 3 pg      MCHC 32 8 g/dL      RDW 13 5 %      MPV 9 4 fL      Platelets 975 Thousands/uL      nRBC 0 /100 WBCs      Neutrophils Relative 67 %      Immat GRANS % 0 %      Lymphocytes Relative 23 %      Monocytes Relative 8 %      Eosinophils Relative 1 %      Basophils Relative 1 %      Neutrophils Absolute 6 64 Thousands/µL      Immature Grans Absolute 0 03 Thousand/uL      Lymphocytes Absolute 2 32 Thousands/µL      Monocytes Absolute 0 82 Thousand/µL      Eosinophils Absolute 0 08 Thousand/µL      Basophils Absolute 0 05 Thousands/µL                  CT abdomen pelvis with contrast   Final Result by Bernie Walters MD (03/03 1936)      No acute abdominal or pelvic pathology  No bowel obstruction or inflammation  Normal appendix  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 secondary to superimposed facet hypertrophy  Additional findings as above  Workstation performed: [id number]               Procedures  ECG 12 Lead Documentation Only    Date/Time: 3/4/2023 12:47 AM  Performed by: July Jessica MD  Authorized by: July Jessica MD     Indications / Diagnosis:  Ab pain  Patient location:  ED  Previous ECG:     Previous ECG:  Unavailable  Interpretation:     Interpretation: abnormal    Rate:     ECG rate:  66    ECG rate assessment: normal    Rhythm:     Rhythm: sinus rhythm    QRS:     QRS axis:  Normal    QRS intervals:  Normal  Conduction:     Conduction: normal    ST segments:     ST segments:  Normal  T waves:     T waves: normal    Comments:      NSR with fusion complexes  Low voltage QRS  Normal axis, normal intervals  No ST elevations or depressions  No T wave inversions            ED Course             HEART Risk Score    Flowsheet Row Most Recent Value   Heart Score Risk Calculator    History 0 Filed at: 03/04/2023 0052   ECG 0 Filed at: 03/04/2023 0052   Age 2 Filed at: 03/04/2023 0052   Risk Factors 1 Filed at: 03/04/2023 0052   Troponin 0 Filed at: 03/04/2023 0052   HEART Score 3 Filed at: 03/04/2023 410 Medical Decision Making  65-year-old female with lower abdominal pain since last night  Had similar episode 3 weeks ago, followed up with her PCP was given Colace and MiraLAX which she has been taking and has been helping  Patient is concerned for cancer  Nontender, nonperitoneal abdomen on exam with normal bowel sounds, nondistended  Labs and UA unremarkable  CT abdomen pelvis with chronic findings, otherwise nothing acute  Patient discharged home with outpatient follow-up, strict return precautions  Recommended she obtain colonoscopy as soon as possible with GI given alternating bowel movements which may be concerning for colon cancer  Pt verbalized understanding  Amount and/or Complexity of Data Reviewed  External Data Reviewed: labs, ECG and notes  Labs: ordered  Radiology: ordered  ECG/medicine tests: ordered and independent interpretation performed  Risk  Prescription drug management  Disposition  Final diagnoses:   Lower abdominal pain   Liver cyst   Spinal stenosis at L4-L5 level     Time reflects when diagnosis was documented in both MDM as applicable and the Disposition within this note     Time User Action Codes Description Comment    3/3/2023  7:54 PM Catalina Kymberly Add [R10 30] Lower abdominal pain     3/3/2023  7:55 PM Catalina Kymberly Add [K76 89] Liver cyst     3/3/2023  7:55 PM Catalina Kymberly Add [C90 838] Spinal stenosis at L4-L5 level       ED Disposition     ED Disposition   Discharge    Condition   Stable    Date/Time   Fri Mar 3, 2023  7:54 PM    Comment   Padmini [facility] discharge to home/self care  Follow-up Information     Follow up With Specialties Details Why Contact Info Additional Information    Rosemary Decker PA-C Family Medicine, Physician Assistant Schedule an appointment as soon as possible for a visit today for follow up [address] [place] [address] [facility] Schedule an appointment as soon as possible for a visit today for colonoscopy [address] [facility] [id number]  Wanda John [address] [facility], [address] [facility], [place], [place], [address]          Discharge Medication List as of 3/3/2023  7:56 PM      CONTINUE these medications which have NOT CHANGED    Details   Calcium Citrate-Vitamin D 315-250 MG-UNIT TABS Take by mouth, Historical Med      Cholecalciferol (VITAMIN D3) 1000 units CAPS Take 1 capsule by mouth daily, Starting Wed 9/25/2013, Historical Med      coenzyme Q-10 100 MG capsule Take by mouth, Historical Med      docusate sodium (COLACE) 100 mg capsule Take 100 mg by mouth 2 (two) times a day, Historical Med      levothyroxine 88 mcg tablet TAKE 1 TABLET BY MOUTH EVERY DAY, Normal      lisinopril-hydrochlorothiazide (PRINZIDE,ZESTORETIC) 10-12 5 MG per tablet TAKE 1 TABLET BY MOUTH EVERY DAY, Normal      meclizine (ANTIVERT) 25 mg tablet Take 1 tablet (25 mg total) by mouth every 8 (eight) hours as needed for dizziness, Starting Fri 12/30/2022, Normal      Omega-3 Fatty Acids (FISH OIL) 645 MG CAPS Take by mouth, Historical Med      Polyethylene Glycol 3350 (MIRALAX PO) Take by mouth, Historical Med      simvastatin (ZOCOR) 20 mg tablet TAKE 1 TABLET BY MOUTH EVERY DAY, Normal               PDMP Review     None           ED Provider  Attending physically available and evaluated Pedro Pablo Evans  [place] managed the patient along with the ED Attending      Electronically Signed by         Darvin Strickland MD  03/04/23 5748

## 2023-03-04 NOTE — ED ATTENDING ATTESTATION
3/3/2023  IRon MD, saw and evaluated the patient  I have discussed the patient with the resident/non-physician practitioner and agree with the resident's/non-physician practitioner's findings, Plan of Care, and MDM as documented in the resident's/non-physician practitioner's note, except where noted  All available labs and Radiology studies were reviewed  I was present for key portions of any procedure(s) performed by the resident/non-physician practitioner and I was immediately available to provide assistance  At this point I agree with the current assessment done in the Emergency Department  I have conducted an independent evaluation of this patient a history and physical is as follows: Constipation at home, CT scan with no evidence of obstruction, patient with no active ongoing abdominal pain in the emergency department  Able to tolerate oral intake  Stable for outpatient management, outpatient PCP/gastroenterology follow-up recommended      Results Reviewed     Procedure Component Value Units Date/Time    HS Troponin 0hr (reflex protocol) [[id number]]  (Normal) Collected: 03/03/23 1614    Lab Status: Final result Specimen: Blood from Arm, Left Updated: 03/03/23 1708     hs TnI 0hr 4 ng/L     Comprehensive metabolic panel [[id number]]  (Abnormal) Collected: 03/03/23 1614    Lab Status: Final result Specimen: Blood from Arm, Left Updated: 03/03/23 1653     Sodium 140 mmol/L      Potassium 3 9 mmol/L      Chloride 103 mmol/L      CO2 25 mmol/L      ANION GAP 12 mmol/L      BUN 17 mg/dL      Creatinine 0 94 mg/dL      Glucose 100 mg/dL      Calcium 9 6 mg/dL      AST 12 U/L      ALT 8 U/L      Alkaline Phosphatase 60 U/L      Total Protein 7 9 g/dL      Albumin 4 3 g/dL      Total Bilirubin 0 46 mg/dL      eGFR 61 ml/min/1 73sq m     Narrative:      [facility] guidelines for Chronic Kidney Disease (CKD):   •  Stage 1 with normal or high GFR (GFR > 90 mL/min/1 73 square meters)  •  Stage 2 Mild CKD (GFR = 60-89 mL/min/1 73 square meters)  •  Stage 3A Moderate CKD (GFR = 45-59 mL/min/1 73 square meters)  •  Stage 3B Moderate CKD (GFR = 30-44 mL/min/1 73 square meters)  •  Stage 4 Severe CKD (GFR = 15-29 mL/min/1 73 square meters)  •  Stage 5 End Stage CKD (GFR <15 mL/min/1 73 square meters)  Note: GFR calculation is accurate only with a steady state creatinine    Lipase [[id number]]  (Normal) Collected: 03/03/23 1614    Lab Status: Final result Specimen: Blood from Arm, Left Updated: 03/03/23 1653     Lipase 24 u/L     UA w Reflex to Microscopic w Reflex to Culture [[id number]] Collected: 03/03/23 1616    Lab Status: Final result Specimen: Urine, Clean Catch Updated: 03/03/23 1639     Color, UA Light Yellow     Clarity, UA Clear     Specific Gravity, UA 1 017     pH, UA 7 0     Leukocytes, UA Negative     Nitrite, UA Negative     Protein, UA Negative mg/dl      Glucose, UA Negative mg/dl      Ketones, UA Negative mg/dl      Urobilinogen, UA <2 0 mg/dl      Bilirubin, UA Negative     Occult Blood, UA Negative    CBC and differential [[id number]] Collected: 03/03/23 1614    Lab Status: Final result Specimen: Blood from Arm, Left Updated: 03/03/23 1627     WBC 9 94 Thousand/uL      RBC 5 09 Million/uL      Hemoglobin 14 9 g/dL      Hematocrit 45 4 %      MCV 89 fL      MCH 29 3 pg      MCHC 32 8 g/dL      RDW 13 5 %      MPV 9 4 fL      Platelets 385 Thousands/uL      nRBC 0 /100 WBCs      Neutrophils Relative 67 %      Immat GRANS % 0 %      Lymphocytes Relative 23 %      Monocytes Relative 8 %      Eosinophils Relative 1 %      Basophils Relative 1 %      Neutrophils Absolute 6 64 Thousands/µL      Immature Grans Absolute 0 03 Thousand/uL      Lymphocytes Absolute 2 32 Thousands/µL      Monocytes Absolute 0 82 Thousand/µL      Eosinophils Absolute 0 08 Thousand/µL      Basophils Absolute 0 05 Thousands/µL         CT abdomen pelvis with contrast   Final Result by Len Kowalski MD (03/03 1936)      No acute abdominal or pelvic pathology  No bowel obstruction or inflammation  Normal appendix  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

## 2023-03-04 NOTE — DISCHARGE INSTRUCTIONS
As discussed, please schedule your colonoscopy as soon as possible as you are overdue  Return to ED if any worsening symptoms  Follow up with your PCP this week

## 2023-03-06 LAB
ATRIAL RATE: 66 BPM
P AXIS: 63 DEGREES
PR INTERVAL: 160 MS
QRS AXIS: -12 DEGREES
QRSD INTERVAL: 84 MS
QT INTERVAL: 420 MS
QTC INTERVAL: 440 MS
T WAVE AXIS: 45 DEGREES
VENTRICULAR RATE: 66 BPM

## 2023-03-26 DIAGNOSIS — E78.5 HYPERLIPIDEMIA, UNSPECIFIED HYPERLIPIDEMIA TYPE: ICD-10-CM

## 2023-03-27 RX ORDER — SIMVASTATIN 20 MG
TABLET ORAL
Qty: 90 TABLET | Refills: 1 | Status: SHIPPED | OUTPATIENT
Start: 2023-03-27

## 2023-04-02 DIAGNOSIS — I10 ESSENTIAL HYPERTENSION: ICD-10-CM

## 2023-04-03 RX ORDER — LISINOPRIL AND HYDROCHLOROTHIAZIDE 12.5; 1 MG/1; MG/1
TABLET ORAL
Qty: 90 TABLET | Refills: 1 | Status: SHIPPED | OUTPATIENT
Start: 2023-04-03

## 2023-05-02 ENCOUNTER — CONSULT (OUTPATIENT)
Dept: GASTROENTEROLOGY | Facility: AMBULARY SURGERY CENTER | Age: 73
End: 2023-05-02

## 2023-05-02 VITALS
OXYGEN SATURATION: 98 % | SYSTOLIC BLOOD PRESSURE: 138 MMHG | HEART RATE: 89 BPM | BODY MASS INDEX: 33.86 KG/M2 | HEIGHT: 62 IN | DIASTOLIC BLOOD PRESSURE: 84 MMHG | WEIGHT: 184 LBS

## 2023-05-02 DIAGNOSIS — K59.00 CONSTIPATION, UNSPECIFIED CONSTIPATION TYPE: ICD-10-CM

## 2023-05-02 DIAGNOSIS — R19.4 CHANGE IN BOWEL HABITS: Primary | ICD-10-CM

## 2023-05-02 DIAGNOSIS — Z12.11 COLON CANCER SCREENING: ICD-10-CM

## 2023-05-02 RX ORDER — BISACODYL 5 MG/1
10 TABLET, DELAYED RELEASE ORAL ONCE
Qty: 2 TABLET | Refills: 0 | Status: SHIPPED | OUTPATIENT
Start: 2023-05-02 | End: 2023-05-02

## 2023-05-02 NOTE — PATIENT INSTRUCTIONS
Scheduled date of colonoscopy (as of today): 6/28/23  Physician performing colonoscopy: Dr Jose Stephens  [facility]  Bowel prep reviewed with patient: Miralax/Dulcolax  Instructions reviewed with patient by: Yudelka HDEZ  Clearances:

## 2023-05-02 NOTE — PROGRESS NOTES
[facility] - Outpatient Consultation  Caden Old 81 y o  female MRN: [id number]  Encounter: [id number]          ASSESSMENT AND PLAN:      1  Constipation, change in bowel habits; patient has also not had colonoscopy for 11 to 12 years and is due for CRC surveillance in any case; rule out underlying adenomatous polyps or malignancy    -We will schedule patient for colonoscopy    -Procedure was explained in detail to the patient at this time including associated risks and benefits, risks including but not limited to infection, perforation and bleeding    -Prescription and instructions provided for colonoscopy prep    -Patient reports some improvement of her constipation with addition of stool softeners, advised she may continue this, may introduce MiraLAX as needed if she experiences breakthrough symptomatology, otherwise we will assess for any underlying intraluminal pathology at the time of colonoscopy    ______________________________________________________________________    HPI:  82-year-old female with history of hypertension and hyperlipidemia who presents for evaluation; she says that a few months ago in February she was experiencing problems with constipation and abdominal pains, she saw her primary care provider who recommended stool softeners which helped her for a while, she says she started to develop abdominal pain again and was referred to the emergency room at the beginning of March, CT scan was checked and showed no acute pathology, there is some fecalization of the ileocecal valve noted  Patient says she continued to take stool softeners and ultimately had some improvement in her constipation, she says she is not really having much abdominal pain anymore at this time, weight has also been stable for the last 3 months      She does note that she has not had a colonoscopy since 2011 or 2012, she says this did not note polyps and she denies any known family history of colon cancer  She says she is currently moving her bowels about once every 1 to 2 days, denies any rectal bleeding or melena, any diarrhea  TSH at the end of 2022 appeared normal       She reports she has had nausea with anesthesia for colonoscopy in the past; otherwise she denies any supplemental oxygen dependence, CPAP/BiPAP dependence or use of pharmacologic anticoagulation  REVIEW OF SYSTEMS:    CONSTITUTIONAL: Denies any fever, chills, rigors, and weight loss  HEENT: No earache or tinnitus  Denies hearing loss or visual disturbances  CARDIOVASCULAR: No chest pain or palpitations  RESPIRATORY: Denies any cough, hemoptysis, shortness of breath or dyspnea on exertion  GASTROINTESTINAL: As noted in the History of Present Illness  GENITOURINARY: No problems with urination  Denies any hematuria or dysuria  NEUROLOGIC: No dizziness or vertigo, denies headaches  MUSCULOSKELETAL: Denies any muscle or joint pain  SKIN: Denies skin rashes or itching  ENDOCRINE: Denies excessive thirst  Denies intolerance to heat or cold  PSYCHOSOCIAL: Denies depression or anxiety  Denies any recent memory loss         Historical Information   Past Medical History:   Diagnosis Date    Hyperlipidemia     Hypertension      Past Surgical History:   Procedure Laterality Date    COLONOSCOPY      TUBAL LIGATION       Social History   Social History     Substance and Sexual Activity   Alcohol Use Not Currently    Comment: rare     Social History     Substance and Sexual Activity   Drug Use No     Social History     Tobacco Use   Smoking Status Never    Passive exposure: Past   Smokeless Tobacco Never     Family History   Problem Relation Age of Onset    Diabetes Mother         DM    No Known Problems Father     Leukemia Sister 79    Lymphoma Sister 54    No Known Problems Daughter     No Known Problems Sister     No Known Problems Sister     No Known Problems Maternal Grandmother     No Known Problems Maternal "Grandfather     No Known Problems Paternal Grandmother     No Known Problems Paternal Grandfather     No Known Problems Son        Meds/Allergies       Current Outpatient Medications:     bisacodyl (DULCOLAX) 5 mg EC tablet    Calcium Citrate-Vitamin D 315-250 MG-UNIT TABS    Cholecalciferol (VITAMIN D3) 1000 units CAPS    coenzyme Q-10 100 MG capsule    docusate sodium (COLACE) 100 mg capsule    levothyroxine 88 mcg tablet    lisinopril-hydrochlorothiazide (PRINZIDE,ZESTORETIC) 10-12 5 MG per tablet    meclizine (ANTIVERT) 25 mg tablet    Omega-3 Fatty Acids (FISH OIL) 645 MG CAPS    polyethylene glycol (GOLYTELY) 4000 mL solution    Polyethylene Glycol 3350 (MIRALAX PO)    simvastatin (ZOCOR) 20 mg tablet    No Known Allergies        Objective     Blood pressure 138/84, pulse 89, height 5' 2 25\" (1 581 m), weight 83 5 kg (184 lb), SpO2 98 %, not currently breastfeeding  Body mass index is 33 38 kg/m²  PHYSICAL EXAM:      General Appearance:   Alert, cooperative, no distress   HEENT:   Normocephalic, atraumatic, anicteric      Neck:  Supple, symmetrical, trachea midline   Lungs:   Clear to auscultation bilaterally; no rales, rhonchi or wheezing; respirations unlabored    Heart[de-identified]   Regular rate and rhythm; no murmur, rub, or gallop  Abdomen:   Soft, non-tender, non-distended; normal bowel sounds; no masses, no organomegaly    Genitalia:   Deferred    Rectal:   Deferred    Extremities:  No cyanosis, clubbing or edema    Pulses:  2+ and symmetric    Skin:  No jaundice, rashes, or lesions    Lymph nodes:  No palpable cervical lymphadenopathy        Lab Results:   No visits with results within 1 Day(s) from this visit     Latest known visit with results is:   Admission on 03/03/2023, Discharged on 03/03/2023   Component Date Value    WBC 03/03/2023 9 94     RBC 03/03/2023 5 09     Hemoglobin 03/03/2023 14 9     Hematocrit 03/03/2023 45 4     MCV 03/03/2023 89     MCH 03/03/2023 29 3     " MCHC 03/03/2023 32 8     RDW 03/03/2023 13 5     MPV 03/03/2023 9 4     Platelets 39/53/3031 373     nRBC 03/03/2023 0     Neutrophils Relative 03/03/2023 67     Immat GRANS % 03/03/2023 0     Lymphocytes Relative 03/03/2023 23     Monocytes Relative 03/03/2023 8     Eosinophils Relative 03/03/2023 1     Basophils Relative 03/03/2023 1     Neutrophils Absolute 03/03/2023 6 64     Immature Grans Absolute 03/03/2023 0 03     Lymphocytes Absolute 03/03/2023 2 32     Monocytes Absolute 03/03/2023 0 82     Eosinophils Absolute 03/03/2023 0 08     Basophils Absolute 03/03/2023 0 05     Sodium 03/03/2023 140     Potassium 03/03/2023 3 9     Chloride 03/03/2023 103     CO2 03/03/2023 25     ANION GAP 03/03/2023 12     BUN 03/03/2023 17     Creatinine 03/03/2023 0 94     Glucose 03/03/2023 100     Calcium 03/03/2023 9 6     AST 03/03/2023 12 (L)     ALT 03/03/2023 8     Alkaline Phosphatase 03/03/2023 60     Total Protein 03/03/2023 7 9     Albumin 03/03/2023 4 3     Total Bilirubin 03/03/2023 0 46     eGFR 03/03/2023 61     Lipase 03/03/2023 24     hs TnI 0hr 03/03/2023 4     Color, UA 03/03/2023 Light Yellow     Clarity, UA 03/03/2023 Clear     Specific Gravity, UA 03/03/2023 1 017     pH, UA 03/03/2023 7 0     Leukocytes, UA 03/03/2023 Negative     Nitrite, UA 03/03/2023 Negative     Protein, UA 03/03/2023 Negative     Glucose, UA 03/03/2023 Negative     Ketones, UA 03/03/2023 Negative     Urobilinogen, UA 03/03/2023 <2 0     Bilirubin, UA 03/03/2023 Negative     Occult Blood, UA 03/03/2023 Negative     Ventricular Rate 03/03/2023 66     Atrial Rate 03/03/2023 66     MA Interval 03/03/2023 160     QRSD Interval 03/03/2023 84     QT Interval 03/03/2023 420     QTC Interval 03/03/2023 440     P Axis 03/03/2023 63     QRS Axis 03/03/2023 -12     T Wave Axis 03/03/2023 45          Radiology Results:   No results found

## 2023-05-31 DIAGNOSIS — E03.9 HYPOTHYROIDISM, UNSPECIFIED TYPE: ICD-10-CM

## 2023-05-31 RX ORDER — LEVOTHYROXINE SODIUM 88 UG/1
TABLET ORAL
Qty: 90 TABLET | Refills: 1 | Status: SHIPPED | OUTPATIENT
Start: 2023-05-31

## 2023-06-09 ENCOUNTER — OFFICE VISIT (OUTPATIENT)
Dept: FAMILY MEDICINE CLINIC | Facility: CLINIC | Age: 73
End: 2023-06-09
Payer: MEDICARE

## 2023-06-09 VITALS
TEMPERATURE: 97.7 F | OXYGEN SATURATION: 97 % | SYSTOLIC BLOOD PRESSURE: 134 MMHG | BODY MASS INDEX: 33.6 KG/M2 | HEART RATE: 84 BPM | WEIGHT: 182.6 LBS | DIASTOLIC BLOOD PRESSURE: 90 MMHG | HEIGHT: 62 IN

## 2023-06-09 DIAGNOSIS — N18.30 STAGE 3 CHRONIC KIDNEY DISEASE, UNSPECIFIED WHETHER STAGE 3A OR 3B CKD (HCC): ICD-10-CM

## 2023-06-09 DIAGNOSIS — I10 BENIGN ESSENTIAL HYPERTENSION: ICD-10-CM

## 2023-06-09 DIAGNOSIS — L02.224 BOIL OF GROIN: Primary | ICD-10-CM

## 2023-06-09 PROCEDURE — 99213 OFFICE O/P EST LOW 20 MIN: CPT | Performed by: PHYSICIAN ASSISTANT

## 2023-06-09 RX ORDER — AMOXICILLIN AND CLAVULANATE POTASSIUM 875; 125 MG/1; MG/1
1 TABLET, FILM COATED ORAL EVERY 12 HOURS SCHEDULED
Qty: 14 TABLET | Refills: 0 | Status: SHIPPED | OUTPATIENT
Start: 2023-06-09 | End: 2023-06-16

## 2023-06-09 NOTE — PROGRESS NOTES
Assessment/Plan:     Diagnoses and all orders for this visit:    Boil of groin  -     amoxicillin-clavulanate (AUGMENTIN) 875-125 mg per tablet; Take 1 tablet by mouth every 12 (twelve) hours for 7 days    Benign essential hypertension  Comments:  Pressure is elevated in the office today  She will continue her current regimen  Watch her salt  Blood pressure at home and call with readings    Stage 3 chronic kidney disease, unspecified whether stage 3a or 3b CKD ([facility])          Subjective:      Patient ID: Hugo President is a 81 y o  female  Patient presents with a groin boil  Patient states she started Wednesday with a small pimple-like lesion  She tried to squeeze it  She woke up and the lesion is red and hard and tender  She will apply warm compresses  No antibiotics ordered        The following portions of the patient's history were reviewed and updated as appropriate:   She   Patient Active Problem List    Diagnosis Date Noted   • Stage 3 chronic kidney disease, unspecified whether stage 3a or 3b CKD ([facility] [address] ) 12/30/2022   • Sciatica of right side 06/09/2020   • Renal insufficiency 01/28/2020   • Screening for breast cancer 09/28/2018   • Vitamin D deficiency 09/25/2013   • Benign essential hypertension 06/18/2012   • Hyperlipidemia 06/18/2012   • Hypothyroidism 06/18/2012   • Osteopenia 06/18/2012     Current Outpatient Medications   Medication Sig Dispense Refill   • amoxicillin-clavulanate (AUGMENTIN) 875-125 mg per tablet Take 1 tablet by mouth every 12 (twelve) hours for 7 days 14 tablet 0   • Calcium Citrate-Vitamin D 315-250 MG-UNIT TABS Take by mouth     • Cholecalciferol (VITAMIN D3) 1000 units CAPS Take 1 capsule by mouth daily     • coenzyme Q-10 100 MG capsule Take by mouth     • docusate sodium (COLACE) 100 mg capsule Take 100 mg by mouth 2 (two) times a day     • levothyroxine 88 mcg tablet TAKE 1 TABLET BY MOUTH EVERY DAY 90 tablet 1   • lisinopril-hydrochlorothiazide (PRINZIDE,ZESTORETIC) 10-12 5 MG per tablet TAKE 1 TABLET BY MOUTH EVERY DAY 90 tablet 1   • meclizine (ANTIVERT) 25 mg tablet Take 1 tablet (25 mg total) by mouth every 8 (eight) hours as needed for dizziness 30 tablet 0   • Omega-3 Fatty Acids (FISH OIL) 645 MG CAPS Take by mouth     • Polyethylene Glycol 3350 (MIRALAX PO) Take by mouth     • simvastatin (ZOCOR) 20 mg tablet TAKE 1 TABLET BY MOUTH EVERY DAY 90 tablet 1   • bisacodyl (DULCOLAX) 5 mg EC tablet Take 2 tablets (10 mg total) by mouth once for 1 dose 2 tablet 0   • polyethylene glycol (GOLYTELY) 4000 mL solution Take 4,000 mL by mouth once for 1 dose 4000 mL 0     No current facility-administered medications for this visit  She has No Known Allergies       Review of Systems   Constitutional: Negative for fever  Skin:        Lesion   Right  Groin          Objective:        Physical Exam  Vitals and nursing note reviewed  Constitutional:       Appearance: Normal appearance  HENT:      Head: Normocephalic and atraumatic  Right Ear: External ear normal       Left Ear: External ear normal    Cardiovascular:      Rate and Rhythm: Normal rate and regular rhythm  Heart sounds: Normal heart sounds  No murmur heard  Pulmonary:      Effort: Pulmonary effort is normal    Musculoskeletal:      Right lower leg: No edema  Left lower leg: No edema  Skin:     General: Skin is warm and dry  Comments: Boil  Right   Groin  Red  Tender   Nonfluccuent  Neurological:      Mental Status: She is alert

## 2023-06-14 ENCOUNTER — ANESTHESIA EVENT (OUTPATIENT)
Dept: ANESTHESIOLOGY | Facility: HOSPITAL | Age: 73
End: 2023-06-14

## 2023-06-14 ENCOUNTER — ANESTHESIA (OUTPATIENT)
Dept: ANESTHESIOLOGY | Facility: HOSPITAL | Age: 73
End: 2023-06-14

## 2023-06-28 ENCOUNTER — HOSPITAL ENCOUNTER (OUTPATIENT)
Dept: GASTROENTEROLOGY | Facility: AMBULARY SURGERY CENTER | Age: 73
Setting detail: OUTPATIENT SURGERY
Discharge: HOME | End: 2023-06-28
Payer: MEDICARE

## 2023-06-28 ENCOUNTER — ANESTHESIA (OUTPATIENT)
Dept: GASTROENTEROLOGY | Facility: AMBULARY SURGERY CENTER | Age: 73
End: 2023-06-28

## 2023-06-28 ENCOUNTER — ANESTHESIA EVENT (OUTPATIENT)
Dept: GASTROENTEROLOGY | Facility: AMBULARY SURGERY CENTER | Age: 73
End: 2023-06-28

## 2023-06-28 VITALS
BODY MASS INDEX: 28.93 KG/M2 | HEIGHT: 66 IN | SYSTOLIC BLOOD PRESSURE: 135 MMHG | TEMPERATURE: 97.2 F | DIASTOLIC BLOOD PRESSURE: 65 MMHG | OXYGEN SATURATION: 95 % | HEART RATE: 66 BPM | RESPIRATION RATE: 18 BRPM | WEIGHT: 180 LBS

## 2023-06-28 DIAGNOSIS — K59.00 CONSTIPATION, UNSPECIFIED CONSTIPATION TYPE: ICD-10-CM

## 2023-06-28 DIAGNOSIS — Z12.11 COLON CANCER SCREENING: ICD-10-CM

## 2023-06-28 DIAGNOSIS — R19.4 CHANGE IN BOWEL HABITS: ICD-10-CM

## 2023-06-28 PROCEDURE — 45378 DIAGNOSTIC COLONOSCOPY: CPT | Performed by: INTERNAL MEDICINE

## 2023-06-28 RX ORDER — SODIUM CHLORIDE, SODIUM LACTATE, POTASSIUM CHLORIDE, CALCIUM CHLORIDE 600; 310; 30; 20 MG/100ML; MG/100ML; MG/100ML; MG/100ML
INJECTION, SOLUTION INTRAVENOUS CONTINUOUS PRN
Status: DISCONTINUED | OUTPATIENT
Start: 2023-06-28 | End: 2023-06-28

## 2023-06-28 RX ORDER — PROPOFOL 10 MG/ML
INJECTION, EMULSION INTRAVENOUS AS NEEDED
Status: DISCONTINUED | OUTPATIENT
Start: 2023-06-28 | End: 2023-06-28

## 2023-06-28 RX ORDER — LIDOCAINE HYDROCHLORIDE 10 MG/ML
INJECTION, SOLUTION EPIDURAL; INFILTRATION; INTRACAUDAL; PERINEURAL AS NEEDED
Status: DISCONTINUED | OUTPATIENT
Start: 2023-06-28 | End: 2023-06-28

## 2023-06-28 RX ADMIN — PROPOFOL 20 MG: 10 INJECTION, EMULSION INTRAVENOUS at 09:51

## 2023-06-28 RX ADMIN — PROPOFOL 20 MG: 10 INJECTION, EMULSION INTRAVENOUS at 09:49

## 2023-06-28 RX ADMIN — LIDOCAINE HYDROCHLORIDE 20 MG: 10 INJECTION, SOLUTION EPIDURAL; INFILTRATION; INTRACAUDAL; PERINEURAL at 09:47

## 2023-06-28 RX ADMIN — SODIUM CHLORIDE, SODIUM LACTATE, POTASSIUM CHLORIDE, AND CALCIUM CHLORIDE: .6; .31; .03; .02 INJECTION, SOLUTION INTRAVENOUS at 09:20

## 2023-06-28 RX ADMIN — PROPOFOL 60 MG: 10 INJECTION, EMULSION INTRAVENOUS at 09:47

## 2023-06-28 RX ADMIN — PROPOFOL 20 MG: 10 INJECTION, EMULSION INTRAVENOUS at 09:53

## 2023-06-28 NOTE — H&P
History and Physical -  [facility]  Julio Joseph 81 y.o. female MRN: [id number]        HPI: 74-year-old female with history hypertension, hyperlipidemia reports having issues with her bowel movements and constipation. Historical Information   Past Medical History:   Diagnosis Date   • Hyperlipidemia    • Hypertension      Past Surgical History:   Procedure Laterality Date   • COLONOSCOPY     • TUBAL LIGATION       Social History   Social History     Substance and Sexual Activity   Alcohol Use Not Currently    Comment: rare     Social History     Substance and Sexual Activity   Drug Use No     Social History     Tobacco Use   Smoking Status Never   • Passive exposure: Past   Smokeless Tobacco Never     Family History   Problem Relation Age of Onset   • Diabetes Mother         DM   • No Known Problems Father    • Leukemia Sister 79   • Lymphoma Sister 54   • No Known Problems Daughter    • No Known Problems Sister    • No Known Problems Sister    • No Known Problems Maternal Grandmother    • No Known Problems Maternal Grandfather    • No Known Problems Paternal Grandmother    • No Known Problems Paternal Grandfather    • No Known Problems Son        Meds/Allergies     (Not in a hospital admission)      No Known Allergies    Objective     not currently breastfeeding.     PHYSICAL EXAM:    Gen: NAD  CV: S1 & S2 normal, RRR  CHEST: Clear to auscultate  ABD: soft, NT/ND, good bowel sounds  EXT: no edema    ASSESSMENT:     Constipation    PLAN:    Colonoscopy

## 2023-06-28 NOTE — ANESTHESIA POSTPROCEDURE EVALUATION
Post-Op Assessment Note    CV Status:  Stable  Pain Score: 0    Pain management: adequate     Mental Status:  Alert and awake   Hydration Status:  Euvolemic   PONV Controlled:  Controlled   Airway Patency:  Patent      Post Op Vitals Reviewed: Yes      Staff: CRNA         No notable events documented.     BP   118/61   Temp     Pulse 60   Resp 18   SpO2 98% RA

## 2023-06-28 NOTE — ANESTHESIA PREPROCEDURE EVALUATION
Procedure:  COLONOSCOPY    Relevant Problems   CARDIO   (+) Benign essential hypertension   (+) Hyperlipidemia      ENDO   (+) Hypothyroidism      /RENAL   (+) Renal insufficiency   (+) Stage 3 chronic kidney disease, unspecified whether stage 3a or 3b CKD ([facility])      MUSCULOSKELETAL   (+) Sciatica of right side        Physical Exam    Airway    Mallampati score: II  TM Distance: >3 FB  Neck ROM: full     Dental       Cardiovascular      Pulmonary      Other Findings        Anesthesia Plan  ASA Score- 2     Anesthesia Type- IV sedation with anesthesia with ASA Monitors. Additional Monitors:   Airway Plan:           Plan Factors-    Chart reviewed. Induction- intravenous. Postoperative Plan-     Informed Consent- Anesthetic plan and risks discussed with patient. I personally reviewed this patient with the CRNA. Discussed and agreed on the Anesthesia Plan with the CRNA. Aj Morin

## 2023-07-03 ENCOUNTER — APPOINTMENT (OUTPATIENT)
Dept: LAB | Facility: CLINIC | Age: 73
End: 2023-07-03
Payer: MEDICARE

## 2023-07-03 ENCOUNTER — RA CDI HCC (OUTPATIENT)
Dept: OTHER | Facility: HOSPITAL | Age: 73
End: 2023-07-03

## 2023-07-03 DIAGNOSIS — I10 BENIGN ESSENTIAL HYPERTENSION: ICD-10-CM

## 2023-07-03 DIAGNOSIS — E03.9 HYPOTHYROIDISM, UNSPECIFIED TYPE: ICD-10-CM

## 2023-07-03 DIAGNOSIS — E78.2 MIXED HYPERLIPIDEMIA: ICD-10-CM

## 2023-07-03 LAB
ALBUMIN SERPL BCP-MCNC: 3.6 G/DL (ref 3.5–5)
ALP SERPL-CCNC: 55 U/L (ref 46–116)
ALT SERPL W P-5'-P-CCNC: 17 U/L (ref 12–78)
ANION GAP SERPL CALCULATED.3IONS-SCNC: 6 MMOL/L
AST SERPL W P-5'-P-CCNC: 12 U/L (ref 5–45)
BILIRUB SERPL-MCNC: 0.59 MG/DL (ref 0.2–1)
BUN SERPL-MCNC: 20 MG/DL (ref 5–25)
CALCIUM SERPL-MCNC: 9.5 MG/DL (ref 8.3–10.1)
CHLORIDE SERPL-SCNC: 108 MMOL/L (ref 96–108)
CHOLEST SERPL-MCNC: 187 MG/DL
CO2 SERPL-SCNC: 25 MMOL/L (ref 21–32)
CREAT SERPL-MCNC: 1.1 MG/DL (ref 0.6–1.3)
GFR SERPL CREATININE-BSD FRML MDRD: 50 ML/MIN/1.73SQ M
GLUCOSE P FAST SERPL-MCNC: 105 MG/DL (ref 65–99)
HDLC SERPL-MCNC: 51 MG/DL
LDLC SERPL CALC-MCNC: 108 MG/DL (ref 0–100)
NONHDLC SERPL-MCNC: 136 MG/DL
POTASSIUM SERPL-SCNC: 4.5 MMOL/L (ref 3.5–5.3)
PROT SERPL-MCNC: 7.5 G/DL (ref 6.4–8.4)
SODIUM SERPL-SCNC: 139 MMOL/L (ref 135–147)
TRIGL SERPL-MCNC: 138 MG/DL
TSH SERPL DL<=0.05 MIU/L-ACNC: 4.42 UIU/ML (ref 0.45–4.5)

## 2023-07-03 PROCEDURE — 84443 ASSAY THYROID STIM HORMONE: CPT

## 2023-07-03 PROCEDURE — 80061 LIPID PANEL: CPT

## 2023-07-03 PROCEDURE — 36415 COLL VENOUS BLD VENIPUNCTURE: CPT

## 2023-07-03 PROCEDURE — 80053 COMPREHEN METABOLIC PANEL: CPT

## 2023-07-03 NOTE — PROGRESS NOTES
[facility] coding opportunities       Chart reviewed, no opportunity found: CHART REVIEWED, NO OPPORTUNITY FOUND        Patients Insurance     Medicare Insurance: Medicare

## 2023-07-13 ENCOUNTER — OFFICE VISIT (OUTPATIENT)
Dept: FAMILY MEDICINE CLINIC | Facility: CLINIC | Age: 73
End: 2023-07-13
Payer: MEDICARE

## 2023-07-13 VITALS
WEIGHT: 182.2 LBS | DIASTOLIC BLOOD PRESSURE: 86 MMHG | HEART RATE: 71 BPM | OXYGEN SATURATION: 98 % | HEIGHT: 66 IN | BODY MASS INDEX: 29.28 KG/M2 | TEMPERATURE: 97.7 F | SYSTOLIC BLOOD PRESSURE: 120 MMHG

## 2023-07-13 DIAGNOSIS — E78.2 MIXED HYPERLIPIDEMIA: ICD-10-CM

## 2023-07-13 DIAGNOSIS — I12.9 BENIGN HYPERTENSION WITH CKD (CHRONIC KIDNEY DISEASE) STAGE III (HCC): ICD-10-CM

## 2023-07-13 DIAGNOSIS — M85.80 OSTEOPENIA, UNSPECIFIED LOCATION: ICD-10-CM

## 2023-07-13 DIAGNOSIS — M54.31 SCIATICA OF RIGHT SIDE: ICD-10-CM

## 2023-07-13 DIAGNOSIS — E03.9 HYPOTHYROIDISM, UNSPECIFIED TYPE: ICD-10-CM

## 2023-07-13 DIAGNOSIS — N18.30 STAGE 3 CHRONIC KIDNEY DISEASE, UNSPECIFIED WHETHER STAGE 3A OR 3B CKD (HCC): Primary | ICD-10-CM

## 2023-07-13 DIAGNOSIS — N18.30 BENIGN HYPERTENSION WITH CKD (CHRONIC KIDNEY DISEASE) STAGE III (HCC): ICD-10-CM

## 2023-07-13 PROCEDURE — 99214 OFFICE O/P EST MOD 30 MIN: CPT | Performed by: PHYSICIAN ASSISTANT

## 2023-07-13 RX ORDER — PREDNISONE 10 MG/1
TABLET ORAL
Qty: 21 TABLET | Refills: 0 | Status: SHIPPED | OUTPATIENT
Start: 2023-07-13

## 2023-07-13 NOTE — PROGRESS NOTES
BMI Counseling: Body mass index is 29.41 kg/m². The BMI is above normal. Nutrition recommendations include decreasing portion sizes, encouraging healthy choices of fruits and vegetables and moderation in carbohydrate intake. Exercise recommendations include exercising 3-5 times per week. No pharmacotherapy was ordered. Rationale for BMI follow-up plan is due to patient being overweight or obese. Depression Screening and Follow-up Plan: Patient was screened for depression during today's encounter. They screened negative with a PHQ-2 score of 0. Assessment/Plan:     Diagnoses and all orders for this visit:    Stage 3 chronic kidney disease, unspecified whether stage 3a or 3b CKD ([address])  Comments: This is stable. Avoid over-the-counter NSAIDs  Orders:  -     Comprehensive metabolic panel; Future    Mixed hyperlipidemia  Comments:  Lipids are at goal continue statin therapy and low-fat diet  Orders:  -     Lipid panel; Future    Hypothyroidism, unspecified type  Comments:  TSH in normal range we will continue levothyroxine 88 mcg  Orders:  -     TSH, 3rd generation; Future  -     T4; Future  -     T3; Future    Benign hypertension with CKD (chronic kidney disease) stage III ([facility])  Comments:  Blood pressure at goal continue lisinopril-HCTZ 10-12 0.5  Orders:  -     Comprehensive metabolic panel; Future    Osteopenia, unspecified location  Comments:  Calcium and vitamin D supplements. Sciatica of right side  Comments:  P.o. prednisone taper  Orders:  -     predniSONE 10 mg tablet; 6,5,4,3,2,1          Subjective:      Patient ID: Kristan Mclaughlin is a 81 y.o. female. Presents in the office for follow-up chronic conditions. Patient has hypertension with chronic kidney disease stage III. Current regimen includes lisinopril-HCTZ 10-12 0.5. BUN and creatinine are normal and her GFR is stable at 58. Hyperlipidemia she is on simvastatin 20 mg.   Panel and hepatic functions are normal.  For hypothyroidism she is on levothyroxine 88 mcg. Her current TSH is normal.  He has osteopenia on calcium vitamin D supplements. Patient states her sciatica is acting up. She has pain in the right lower lumbar region in the right buttocks as well. This has been flared up for 2 weeks.   No over-the-counter meds taken      The following portions of the patient's history were reviewed and updated as appropriate:   She   Patient Active Problem List    Diagnosis Date Noted   • Stage 3 chronic kidney disease, unspecified whether stage 3a or 3b CKD ([address] ) 12/30/2022   • Sciatica of right side 06/09/2020   • Renal insufficiency 01/28/2020   • Screening for breast cancer 09/28/2018   • Vitamin D deficiency 09/25/2013   • Benign hypertension with CKD (chronic kidney disease) stage III ([address] ) 06/18/2012   • Hyperlipidemia 06/18/2012   • Hypothyroidism 06/18/2012   • Osteopenia 06/18/2012     Current Outpatient Medications   Medication Sig Dispense Refill   • Calcium Citrate-Vitamin D 315-250 MG-UNIT TABS Take by mouth     • Cholecalciferol (VITAMIN D3) 1000 units CAPS Take 1 capsule by mouth daily     • coenzyme Q-10 100 MG capsule Take by mouth     • docusate sodium (COLACE) 100 mg capsule Take 100 mg by mouth 2 (two) times a day     • levothyroxine 88 mcg tablet TAKE 1 TABLET BY MOUTH EVERY DAY 90 tablet 1   • lisinopril-hydrochlorothiazide (PRINZIDE,ZESTORETIC) 10-12.5 MG per tablet TAKE 1 TABLET BY MOUTH EVERY DAY 90 tablet 1   • meclizine (ANTIVERT) 25 mg tablet Take 1 tablet (25 mg total) by mouth every 8 (eight) hours as needed for dizziness 30 tablet 0   • Omega-3 Fatty Acids (FISH OIL) 645 MG CAPS Take by mouth     • Polyethylene Glycol 3350 (MIRALAX PO) Take by mouth     • predniSONE 10 mg tablet 6,5,4,3,2,1 21 tablet 0   • simvastatin (ZOCOR) 20 mg tablet TAKE 1 TABLET BY MOUTH EVERY DAY 90 tablet 1   • bisacodyl (DULCOLAX) 5 mg EC tablet Take 2 tablets (10 mg total) by mouth once for 1 dose 2 tablet 0     No current facility-administered medications for this visit. She has No Known Allergies. .    Review of Systems   Constitutional: Negative for activity change and unexpected weight change. HENT: Negative for ear pain and sore throat. Eyes: Negative for visual disturbance. Respiratory: Negative for cough, shortness of breath and wheezing. Cardiovascular: Negative for chest pain and leg swelling. Gastrointestinal: Positive for constipation. Negative for abdominal pain, blood in stool, diarrhea, nausea and vomiting. Genitourinary: Negative for difficulty urinating. Musculoskeletal: Positive for back pain. Negative for arthralgias and myalgias. Skin: Negative for rash. Neurological: Negative for dizziness, syncope, light-headedness and headaches. Psychiatric/Behavioral: Negative for self-injury, sleep disturbance and suicidal ideas. The patient is not nervous/anxious. Objective:        Physical Exam  Vitals and nursing note reviewed. Constitutional:       General: She is not in acute distress. Appearance: Normal appearance. She is well-developed. She is not diaphoretic. HENT:      Head: Normocephalic and atraumatic. Right Ear: Tympanic membrane, ear canal and external ear normal.      Left Ear: Tympanic membrane, ear canal and external ear normal.      Mouth/Throat:      Pharynx: No posterior oropharyngeal erythema. Eyes:      Conjunctiva/sclera: Conjunctivae normal.      Pupils: Pupils are equal, round, and reactive to light. Neck:      Thyroid: No thyromegaly. Vascular: No carotid bruit. Cardiovascular:      Rate and Rhythm: Normal rate and regular rhythm. Heart sounds: Normal heart sounds. No murmur heard. No friction rub. No gallop. Pulmonary:      Effort: Pulmonary effort is normal. No respiratory distress. Breath sounds: Normal breath sounds. No wheezing. Abdominal:      General: Bowel sounds are normal. There is no distension. Palpations: Abdomen is soft.  There is no mass. Tenderness: There is no abdominal tenderness. Musculoskeletal:      Right lower leg: No edema. Left lower leg: No edema. Comments: Lower lumbar spine is nontender. Patient points across her right buttocks. Her extremity strength intact passive resistance. Marce reflexes intact. Straight leg raising lying. Lymphadenopathy:      Cervical: No cervical adenopathy. Skin:     General: Skin is warm and dry. Findings: No erythema or rash. Neurological:      General: No focal deficit present. Mental Status: She is alert and oriented to person, place, and time. Psychiatric:         Mood and Affect: Mood normal.         Behavior: Behavior normal.         Thought Content:  Thought content normal.         Judgment: Judgment normal.

## 2023-07-24 ENCOUNTER — OFFICE VISIT (OUTPATIENT)
Dept: FAMILY MEDICINE CLINIC | Facility: CLINIC | Age: 73
End: 2023-07-24
Payer: MEDICARE

## 2023-07-24 VITALS
WEIGHT: 180.4 LBS | HEIGHT: 66 IN | HEART RATE: 67 BPM | SYSTOLIC BLOOD PRESSURE: 140 MMHG | DIASTOLIC BLOOD PRESSURE: 90 MMHG | OXYGEN SATURATION: 97 % | BODY MASS INDEX: 28.99 KG/M2 | TEMPERATURE: 98 F

## 2023-07-24 DIAGNOSIS — M54.31 SCIATICA OF RIGHT SIDE: Primary | ICD-10-CM

## 2023-07-24 DIAGNOSIS — R26.9 GAIT ABNORMALITY: ICD-10-CM

## 2023-07-24 PROCEDURE — 99213 OFFICE O/P EST LOW 20 MIN: CPT | Performed by: PHYSICIAN ASSISTANT

## 2023-07-24 RX ORDER — GABAPENTIN 100 MG/1
100 CAPSULE ORAL 3 TIMES DAILY PRN
Qty: 90 CAPSULE | Refills: 0 | Status: SHIPPED | OUTPATIENT
Start: 2023-07-24

## 2023-07-24 NOTE — PROGRESS NOTES
Assessment/Plan:     Diagnoses and all orders for this visit:    Sciatica of right side  Comments:  Refer to physical therapy. Trial of gabapentin 100 mg 3 times a day  Orders:  -     Ambulatory Referral to Physical Therapy; Future  -     gabapentin (Neurontin) 100 mg capsule; Take 1 capsule (100 mg total) by mouth 3 (three) times a day as needed (pain)    Gait abnormality  -     Ambulatory Referral to Physical Therapy; Future  -     gabapentin (Neurontin) 100 mg capsule; Take 1 capsule (100 mg total) by mouth 3 (three) times a day as needed (pain)          Subjective:      Patient ID: Lillian Hewitt is a 81 y.o. female. Patient presents in the office with continued right-sided low back pain. She states she completed the course of prednisone with no relief. Patient states mainly now her pain is in her right ankle and foot. States has continual pain which becomes worse and more throbbing towards the end of the day. No over-the-counter meds taken.       The following portions of the patient's history were reviewed and updated as appropriate:   She   Patient Active Problem List    Diagnosis Date Noted   • Stage 3 chronic kidney disease, unspecified whether stage 3a or 3b CKD ([facility]) 12/30/2022   • Sciatica of right side 06/09/2020   • Renal insufficiency 01/28/2020   • Screening for breast cancer 09/28/2018   • Vitamin D deficiency 09/25/2013   • Benign hypertension with CKD (chronic kidney disease) stage III ([facility]) 06/18/2012   • Hyperlipidemia 06/18/2012   • Hypothyroidism 06/18/2012   • Osteopenia 06/18/2012     Current Outpatient Medications   Medication Sig Dispense Refill   • Calcium Citrate-Vitamin D 315-250 MG-UNIT TABS Take by mouth     • Cholecalciferol (VITAMIN D3) 1000 units CAPS Take 1 capsule by mouth daily     • coenzyme Q-10 100 MG capsule Take by mouth     • docusate sodium (COLACE) 100 mg capsule Take 100 mg by mouth 2 (two) times a day     • gabapentin (Neurontin) 100 mg capsule Take 1 capsule (100 mg total) by mouth 3 (three) times a day as needed (pain) 90 capsule 0   • levothyroxine 88 mcg tablet TAKE 1 TABLET BY MOUTH EVERY DAY 90 tablet 1   • lisinopril-hydrochlorothiazide (PRINZIDE,ZESTORETIC) 10-12.5 MG per tablet TAKE 1 TABLET BY MOUTH EVERY DAY 90 tablet 1   • meclizine (ANTIVERT) 25 mg tablet Take 1 tablet (25 mg total) by mouth every 8 (eight) hours as needed for dizziness 30 tablet 0   • Omega-3 Fatty Acids (FISH OIL) 645 MG CAPS Take by mouth     • Polyethylene Glycol 3350 (MIRALAX PO) Take by mouth     • simvastatin (ZOCOR) 20 mg tablet TAKE 1 TABLET BY MOUTH EVERY DAY 90 tablet 1   • bisacodyl (DULCOLAX) 5 mg EC tablet Take 2 tablets (10 mg total) by mouth once for 1 dose 2 tablet 0   • predniSONE 10 mg tablet 6,5,4,3,2,1 (Patient not taking: Reported on 7/24/2023) 21 tablet 0     No current facility-administered medications for this visit. She has No Known Allergies. .    Review of Systems   Genitourinary: Negative for difficulty urinating. Musculoskeletal: Positive for arthralgias and back pain. Objective:        Physical Exam  Vitals and nursing note reviewed. Constitutional:       General: She is not in acute distress. Appearance: Normal appearance. She is not ill-appearing. HENT:      Head: Normocephalic and atraumatic. Right Ear: External ear normal.      Left Ear: External ear normal.   Eyes:      Conjunctiva/sclera: Conjunctivae normal.   Pulmonary:      Effort: Pulmonary effort is normal.   Musculoskeletal:      Right lower leg: No edema. Left lower leg: No edema. Comments: Patient has tenderness in the right lower lumbar region in the right buttocks. Straight leg raising lying. Lower extremity strength intact to passive resistance. Is clear. Right ankle and foot nontender. +4 dorsalis pedis pulse. Skin:     General: Skin is warm and dry. Neurological:      General: No focal deficit present.       Mental Status: She is alert and oriented to person, place, and time. Psychiatric:         Mood and Affect: Mood normal.         Behavior: Behavior normal.         Thought Content:  Thought content normal.         Judgment: Judgment normal.

## 2023-07-27 ENCOUNTER — EVALUATION (OUTPATIENT)
Dept: PHYSICAL THERAPY | Facility: CLINIC | Age: 73
End: 2023-07-27
Payer: MEDICARE

## 2023-07-27 DIAGNOSIS — M54.31 SCIATICA OF RIGHT SIDE: Primary | ICD-10-CM

## 2023-07-27 DIAGNOSIS — R26.9 GAIT ABNORMALITY: ICD-10-CM

## 2023-07-27 PROCEDURE — 97162 PT EVAL MOD COMPLEX 30 MIN: CPT

## 2023-07-27 PROCEDURE — 97110 THERAPEUTIC EXERCISES: CPT

## 2023-07-27 NOTE — PROGRESS NOTES
PT Evaluation     Today's date: 2023  Patient name: July Martinez  : 1950  MRN: [id number]  Referring provider: Casey Benson PA-C  Dx:   Encounter Diagnosis     ICD-10-CM    1. Sciatica of right side  M54.31 Ambulatory Referral to Physical Therapy    Refer to physical therapy. Trial of gabapentin 100 mg 3 times a day      2. Gait abnormality  R26.9 Ambulatory Referral to Physical Therapy                     Assessment  Assessment details: July Martinez is a 81 y.o. female who presents with signs and symptoms consistent of lumbar radiculopathy based off of subjective/objective findings as patient was + during all provacative testing. Patient presents with pain, decreased strength, decreased ROM and ambulatory dysfunction. Due to these impairments, Patient has difficulty performing recreational activities and engaging in social activities. No referral necessary at this time as patient has intact myotomal strength, reflexes, and sensation at this time. Will continue to assess moving forwards. Patient responds favorably to prone extensions with program likely to promote extension. Patient would benefit from a comprehensive HEP focusing on improving said deficits. Patient was educated on and provided with an at home exercise program this date to be completed. Patient verbalized understanding of the importance of completion. Patient would benefit from skilled physical therapy to address the impairments, improve their level of function, and to improve their overall quality of life. PT POC 2x/wk for 6 weeks. Thank you for this referral.    Impairments: abnormal gait, abnormal or restricted ROM, activity intolerance, impaired physical strength, lacks appropriate home exercise program, pain with function and poor body mechanics  Understanding of Dx/Px/POC: good   Prognosis: good    Goals  Short Term Goals: to be achieved by 4 weeks  1) Patient to be independent with basic HEP.   2) Decrease pain to 5/10 at its worst.  3) Increase lumbar spine ROM by 25% in all deficient planes. 4) Increase LE strength by 1/2 MMT grade in all deficient planes. Long Term Goals: to be achieved by discharge  1) FOTO equal to or greater than expected outcome. 2) Patient to be independent with comprehensive HEP. 3) Lumbar spine ROM WNL all planes to improve a/iadls. 4) Increase LE strength to 5/5 MMT grade in all planes to improve a/iadls. 5) Increase seated and ambulatory tolerance to 60 min without pain. Plan  Patient would benefit from: skilled physical therapy and PT eval  Planned therapy interventions: joint mobilization, manual therapy, massage, neuromuscular re-education, patient education, strengthening, stretching, therapeutic activities, therapeutic exercise, home exercise program, functional ROM exercises, flexibility, behavior modification, body mechanics training, activity modification and abdominal trunk stabilization  Frequency: 2x week  Duration in weeks: 8  Treatment plan discussed with: patient        Subjective Evaluation    History of Present Illness  Mechanism of injury: History of Current Injury: Patient reports this date following a referral after a recent office visit on 7/24/23 regarding low back pain which radiates into the back of the leg and into the top of her foot. This began a month ago with patient noting flare ups prior but not to this severity. Currently, the pain is most notable in her foot and ankle which began about 3 weeks ago. Has trialed both gabapentin and prednisone without major relief. Notes that when in sitting the pain is the worst as she gets pain in the buttock which shoots down into the top of her foot and lateral portion of her ankle. After sitting for a prolonged period of time, the pain levels improve. Can get some N/T on the top of the foot and ankle with some associated tingling which has been more evident in the last week. Does not note any LBP at this time.  She can tolerate about 30 minutes of standing and walking although this is not comfortable. By the end of the day, patient notes that her pain is at its worst.    Pain location/Descriptors:   Aggravating factors: Sitting, walking  Easing factors: Lying flat, medication  24 HR pattern: Better in the morning and worsening as the day continues. Special Questions: Júnior Eason denies a new onset of Bladder incontinence, Bowel dysfunction, Dizziness, Dysphagia, Dysarthria, Diplopia, Ataxia, Tingling, Numbness and Saddle anesthesia . Patient goals:  Improve pain, return to activities   Hobbies/Interest: Gardening,   Occupation: Retired  Patient Goals  Patient goals for therapy: increased strength, return to sport/leisure activities, decreased pain and increased motion    Pain  Current pain ratin  At best pain ratin  At worst pain rating: 10  Quality: radiating, dull ache and sharp  Aggravating factors: walking, standing and sitting          Objective     Palpation   Left   No palpable tenderness to the erector spinae and quadratus lumborum. Right   No palpable tenderness to the erector spinae and quadratus lumborum. Tenderness     Lumbar Spine  Tenderness in the spinous process. No tenderness in the left transverse process and right transverse process. Left Hip   No tenderness in the PSIS. Right Hip   Tenderness in the PSIS.      Neurological Testing     Sensation     Lumbar   Left   Intact: light touch    Right   Intact: light touch    Reflexes   Left   Patellar (L4): normal (2+)  Achilles (S1): normal (2+)  Clonus sign: negative    Right   Patellar (L4): normal (2+)  Achilles (S1): normal (2+)  Clonus sign: negative    Active Range of Motion     Lumbar   Flexion:  Restriction level: moderate  Extension:  Restriction level: moderate  Left lateral flexion:  with pain Restriction level: minimal  Right lateral flexion:  Restriction level: minimal  Left rotation:  with pain Restriction level: maximal  Right rotation:  Restriction level: minimal    Joint Play     Hypomobile: L5 and S1     Pain: L5 and S1   Mechanical Assessment    Cervical      Thoracic    Lying extension: repeated movements  Pain location: centralized  Pain intensity: better  Pain level: abolished    Lumbar      Strength/Myotome Testing     Lumbar   Left   Heel walk: normal  Toe walk: normal    Right   Heel walk: normal  Toe walk: normal    Left Hip   Planes of Motion   Flexion: 5  Extension: 5  Abduction: 5    Right Hip   Planes of Motion   Flexion: 3+  Extension: 4+  Abduction: 4-    Left Knee   Flexion: 5  Extension: 5    Left Ankle/Foot   Dorsiflexion: 5  Plantar flexion: 5  Great toe extension: 5    Right Ankle/Foot   Dorsiflexion: 4+  Plantar flexion: 5  Great toe extension: 5    Tests     Lumbar   Negative SIJ compression and sacroiliac distraction. Left   Negative passive SLR. Right   Positive passive SLR. Left Pelvic Girdle/Sacrum   Negative: active SLR test.     Right Pelvic Girdle/Sacrum   Positive: active SLR test.     Left Hip   Negative BERTA and FADIR. Right Hip   Negative BERTA and FADIR.                 Diagnosis: LBP   Precautions: spinal stenosis, CKD stage 3    POC Expires:   Re-evaluation Date:    FOTO Scores/Date:   Visit Count 1       Manuals 7/27       LS STM        LS Mobs        LS Gapping         Hip IR/ER Mobs        Hip extension PROM        Ther Ex        Bike         LTR HEP       Posterior pelvic tilts HEP       SLR        SL abduction        Glute bridges        Modified Quadruped rotations         Cat cow        Open Books         Worlds greatest stretch        PPU HEP       Patient education        HEP creation        Neuro Re-Ed        TrA bracing HEP       Supine tapdowns        Supine marches                                                                                                Ther Act              step ups               trx squats              Modalities

## 2023-07-31 ENCOUNTER — OFFICE VISIT (OUTPATIENT)
Dept: PHYSICAL THERAPY | Facility: CLINIC | Age: 73
End: 2023-07-31
Payer: MEDICARE

## 2023-07-31 DIAGNOSIS — R26.9 GAIT ABNORMALITY: ICD-10-CM

## 2023-07-31 DIAGNOSIS — M54.31 SCIATICA OF RIGHT SIDE: Primary | ICD-10-CM

## 2023-07-31 PROCEDURE — 97112 NEUROMUSCULAR REEDUCATION: CPT | Performed by: PHYSICAL THERAPIST

## 2023-07-31 PROCEDURE — 97110 THERAPEUTIC EXERCISES: CPT | Performed by: PHYSICAL THERAPIST

## 2023-07-31 NOTE — PROGRESS NOTES
Daily Note     Today's date: 2023  Patient name: Eun Shelley  : 1950  MRN: [id number]  Referring provider: Jg Paula PA-C  Dx:   Encounter Diagnosis     ICD-10-CM    1. Sciatica of right side  M54.31       2. Gait abnormality  R26.9                      Subjective: The patient returns after IE reporting 10/10 ankle and shin pain when standing improved to a 5/10 when sitting. She denies any improvement in symptoms with initiation of HEP at this time. Objective: See treatment diary below. (+) Slump test on the R      Assessment: The patient demonstrated relief of symptoms after PPU and EDDIE at the beginning of the session; however, the patient notes complete relief of symptoms after several minutes of lying either supine or prone. Added right piriformis stretch with good tolerance and stretch noted. The patient was most challenged with SL abduction. Patient's ankle pain reproduced today with forward flexion and centralized to hip with seated lumbar extension as patient unable to tolerate in standing. The patient continues to respond favorably to an extension based program, but her relief is short-lived at no more than 5-10 minutes. Educated the patient to increase frequency of extension exercises at home to increase time of symptom relief. Plan: Continue per plan of care.       Diagnosis: LBP   Precautions: spinal stenosis, CKD stage 3    POC Expires:   Re-evaluation Date:    FOTO Scores/Date:   Visit Count 1 2      Manuals       LS STM        LS Mobs  Gr II NB      LS Gapping         Hip IR/ER Mobs        Hip extension PROM        Ther Ex        Bike         LTR HEP :05x15      Posterior pelvic tilts HEP       SLR  2x10 ea      SL abduction  2x10 R only      Glute bridges  2x10      Modified Quadruped rotations         Cat cow        Open Books         Worlds greatest stretch        R Piriformis St.   :30x4      EDDIE  3 min      Seated Lumbar Extensions  2x15      PPU HEP 2x10      Patient education        HEP creation        Neuro Re-Ed        TrA bracing HEP :05x15      Supine tapdowns        Supine marches   1x10 ea                                                                                             Ther Act              step ups               trx squats              Modalities

## 2023-08-07 ENCOUNTER — OFFICE VISIT (OUTPATIENT)
Dept: PHYSICAL THERAPY | Facility: CLINIC | Age: 73
End: 2023-08-07
Payer: MEDICARE

## 2023-08-07 DIAGNOSIS — M54.31 SCIATICA OF RIGHT SIDE: Primary | ICD-10-CM

## 2023-08-07 DIAGNOSIS — R26.9 GAIT ABNORMALITY: ICD-10-CM

## 2023-08-07 PROCEDURE — 97110 THERAPEUTIC EXERCISES: CPT

## 2023-08-07 PROCEDURE — 97112 NEUROMUSCULAR REEDUCATION: CPT

## 2023-08-07 NOTE — PROGRESS NOTES
Daily Note     Today's date: 2023  Patient name: Ney Daniel  : 1950  MRN: [id number]  Referring provider: Alissa Gilbert PA-C  Dx:   Encounter Diagnosis     ICD-10-CM    1. Sciatica of right side  M54.31       2. Gait abnormality  R26.9                      Subjective: Patient reports improvements in pain since the last visit. Notes that her pain is still worse at the end of the day but finds that the pain into her foot has been improving with most issue noted in sitting. Objective: See treatment diary below      Assessment: Tolerated treatment well. Progressed lumbar mobility and core control this date to patient functional tolerance. Much of session continued to be performed in supine and prone to mitigate patient discomfort. Challenge present with clamshells this date with some increased discomfort present in posterior thigh. Attempted to perform activities in standing with increases radicular sxs present. Standing lumbar extensions and EDDIE improved patient pain. Updated HEP. Progress as able. Patient would benefit from continued PT      Plan: Continue per plan of care.       Diagnosis: LBP   Precautions: spinal stenosis, CKD stage 3    POC Expires:   Re-evaluation Date:    FOTO Scores/Date:   Visit Count 1 2 3     Manuals      LS STM        LS Mobs  Gr II NB      LS Gapping         Hip IR/ER Mobs        Hip extension PROM        Ther Ex        Bike         LTR HEP :05x15 20x      Posterior pelvic tilts HEP       Clamshells    2x15 rtb      SLR  2x10 ea      SL abduction  2x10 R only      Glute bridges  2x10 2x10 hip abd iso     Modified Quadruped rotations         Cat cow        Open Books    x15 ea side      Worlds greatest stretch        R Piriformis St.   :30x4      EDDIE  3 min 3' then 2' @ end of session     Standing lumbar extensions    20x @ wall      Seated Lumbar Extensions  2x15      PPU HEP 2x10 2x10      Patient education        HEP creation        Neuro Re-Ed TrA bracing HEP :05x15 20x3"     Supine tapdowns        Supine marches   1x10 ea      BKFO    3x10 rtb      Standing hip abd   x10      Standing hip ext    x10                                                                     Ther Act              step ups               trx squats              Modalities

## 2023-08-08 DIAGNOSIS — R26.9 GAIT ABNORMALITY: ICD-10-CM

## 2023-08-08 DIAGNOSIS — M54.31 SCIATICA OF RIGHT SIDE: ICD-10-CM

## 2023-08-08 RX ORDER — GABAPENTIN 100 MG/1
CAPSULE ORAL
Qty: 90 CAPSULE | Refills: 0 | Status: SHIPPED | OUTPATIENT
Start: 2023-08-08

## 2023-08-10 ENCOUNTER — OFFICE VISIT (OUTPATIENT)
Dept: PHYSICAL THERAPY | Facility: CLINIC | Age: 73
End: 2023-08-10
Payer: MEDICARE

## 2023-08-10 DIAGNOSIS — M54.31 SCIATICA OF RIGHT SIDE: Primary | ICD-10-CM

## 2023-08-10 DIAGNOSIS — R26.9 GAIT ABNORMALITY: ICD-10-CM

## 2023-08-10 PROCEDURE — 97112 NEUROMUSCULAR REEDUCATION: CPT

## 2023-08-10 PROCEDURE — 97110 THERAPEUTIC EXERCISES: CPT

## 2023-08-10 NOTE — PROGRESS NOTES
Daily Note     Today's date: 8/10/2023  Patient name: Darlyn Nguyen  : 1950  MRN: [id number]  Referring provider: Elvia Prater PA-C  Dx:   Encounter Diagnosis     ICD-10-CM    1. Sciatica of right side  M54.31       2. Gait abnormality  R26.9                      Subjective: Patient reported feeling really good yesterday during the day but had quite a bit of pain at night which caused her to sit while doing the dishes. Was able to walk for roughly 2 hours during the day without any major discomfort. Objective: See treatment diary below      Assessment: Tolerated treatment well. Continues to respond most favorably to extension. Progressed supine and standing level exercises to patient functional tolerance this date. Some peripherilization of sxs into the RLE with hip abd and paloff press but able to immediately alleviate with prone. Encouraged patient to continue to perform extension based activities and slowly improve her activity tolerance to standing exercises. Updated HEP. Progress as able. Patient would benefit from continued PT      Plan: Continue per plan of care.       Diagnosis: LBP   Precautions: spinal stenosis, CKD stage 3    POC Expires:   Re-evaluation Date:    FOTO Scores/Date:   Visit Count 1 2 3 4    Manuals  810    LS STM        LS Mobs  Gr II NB      LS Gapping         Hip IR/ER Mobs        Hip extension PROM        Ther Ex        Bike         LTR HEP :05x15 20x      Posterior pelvic tilts HEP       Clamshells    2x15 rtb      SLR  2x10 ea  x15 ea     SL abduction  2x10 R only      Glute bridges  2x10 2x10 hip abd iso x10 no band     Hamstring stretch    10x5"    Modified Quadruped rotations         Supine nerve glides     20x     Open Books    x15 ea side  15x ea side     Worlds greatest stretch        R Piriformis St.   :30x4  10x5"    EDDIE  3 min 3' then 2' @ end of session 3' then 2' @ end of session    Standing lumbar extensions    20x @ wall      Seated Lumbar Extensions  2x15      PPU HEP 2x10 2x10  2x10    Patient education        HEP creation        Neuro Re-Ed        TrA bracing HEP :05x15 20x3"     Supine tapdowns    x10     Supine marches   1x10 ea  x10     BKFO    3x10 rtb      Standing hip abd   x10  x10 ea     Standing hip ext    x10  x10 ea     Paloff press    x10 gtb ea     Stir the pot                                                        Ther Act              step ups               trx squats              Modalities

## 2023-08-14 ENCOUNTER — OFFICE VISIT (OUTPATIENT)
Dept: PHYSICAL THERAPY | Facility: CLINIC | Age: 73
End: 2023-08-14
Payer: MEDICARE

## 2023-08-14 DIAGNOSIS — R26.9 GAIT ABNORMALITY: ICD-10-CM

## 2023-08-14 DIAGNOSIS — M54.31 SCIATICA OF RIGHT SIDE: Primary | ICD-10-CM

## 2023-08-14 PROCEDURE — 97530 THERAPEUTIC ACTIVITIES: CPT

## 2023-08-14 PROCEDURE — 97110 THERAPEUTIC EXERCISES: CPT

## 2023-08-14 PROCEDURE — 97112 NEUROMUSCULAR REEDUCATION: CPT

## 2023-08-14 NOTE — PROGRESS NOTES
Daily Note     Today's date: 2023  Patient name: Yesenia Strong  : 1950  MRN: [id number]  Referring provider: Matilde Dickey PA-C  Dx:   Encounter Diagnosis     ICD-10-CM    1. Sciatica of right side  M54.31       2. Gait abnormality  R26.9                      Subjective: Patient reports that she has continued to feel some consistent improvements in her pain levels since last visit. Notes little to no pain on Saturday with some increases in pain present on  night. Finds that her standing tolerance has improved. Objective: See treatment diary below      Assessment: Tolerated treatment well. Continued to progress lumbar and LE mobility this date to patient functional tolerance. Improved tolerance to standing exercise as compared to prior visits although some increase in radicular sxs present by end of session which were able to be alleviated with repeated extensions. Updated HEP. Progress as able. Patient would benefit from continued PT      Plan: Continue per plan of care.       Diagnosis: LBP   Precautions: spinal stenosis, CKD stage 3    POC Expires:   Re-evaluation Date:    FOTO Scores/Date:   Visit Count 1 2 3 4 5/10   Manuals 7/27 7/31 8/7 8/10 8/14   LS STM        LS Mobs  Gr II NB      LS Gapping         Hip IR/ER Mobs        Hip extension PROM        Ther Ex        Bike         LTR HEP :05x15 20x   x10 w/ball    Posterior pelvic tilts HEP       Clamshells    2x15 rtb   2x10 rtb    SLR  2x10 ea  x15 ea     SL abduction  2x10 R only      Glute bridges  2x10 2x10 hip abd iso x10 no band     Hamstring stretch    10x5" 10x5"    Modified Quadruped rotations         Supine nerve glides     20x  20x    Open Books    x15 ea side  15x ea side     DKTC      x15    R Piriformis St.   :30x4  10x5" 10x5"    EDDIE  3 min 3' then 2' @ end of session 3' then 2' @ end of session 3'    Standing lumbar extensions    20x @ wall      Seated Lumbar Extensions  2x15      PPU HEP 2x10 2x10  2x10 2x10 Patient education        HEP creation        Neuro Re-Ed        TrA bracing HEP :05x15 20x3"     Supine tapdowns    x10     Supine marches   1x10 ea  x10     BKFO    3x10 rtb      Standing hip abd   x10  x10 ea  x10 ea   Standing hip ext    x10  x10 ea  x10 ea    Paloff press    x10 gtb ea  2x10 gtb    Stir the pot      x10 gtb CW/CCW                                                   Ther Act              step ups           2x10 4" knee drive     trx squats           mini 2x10 @ table    Modalities

## 2023-08-17 ENCOUNTER — OFFICE VISIT (OUTPATIENT)
Dept: PHYSICAL THERAPY | Facility: CLINIC | Age: 73
End: 2023-08-17
Payer: MEDICARE

## 2023-08-17 DIAGNOSIS — R26.9 GAIT ABNORMALITY: ICD-10-CM

## 2023-08-17 DIAGNOSIS — M54.31 SCIATICA OF RIGHT SIDE: Primary | ICD-10-CM

## 2023-08-17 PROCEDURE — 97110 THERAPEUTIC EXERCISES: CPT

## 2023-08-17 PROCEDURE — 97112 NEUROMUSCULAR REEDUCATION: CPT

## 2023-08-17 NOTE — PROGRESS NOTES
Extensions 2x10 2x15      PPU 2x10 2x10 2x10  2x10 2x10    Patient education        HEP creation        Neuro Re-Ed        TrA bracing 20x3" :05x15 20x3"     Supine tapdowns    x10     Supine marches   1x10 ea  x10     BKFO  3x10 RTB  3x10 rtb      Standing hip abd x10 ea  x10  x10 ea  x10 ea   Standing hip ext  x10 ea  x10  x10 ea  x10 ea    Paloff press 2x10 GTB   x10 gtb ea  2x10 gtb    Stir the pot  x10 gtb CW/CCW    x10 gtb CW/CCW                                                   Ther Act              step ups   2x10 4" knee drive        1D51 4" knee drive     trx squats   mini 2x10 @ table        mini 2x10 @ table    [facility]                                                       [id number]

## 2023-08-21 ENCOUNTER — OFFICE VISIT (OUTPATIENT)
Dept: PHYSICAL THERAPY | Facility: CLINIC | Age: 73
End: 2023-08-21
Payer: MEDICARE

## 2023-08-21 DIAGNOSIS — M54.31 SCIATICA OF RIGHT SIDE: Primary | ICD-10-CM

## 2023-08-21 DIAGNOSIS — R26.9 GAIT ABNORMALITY: ICD-10-CM

## 2023-08-21 PROCEDURE — 97530 THERAPEUTIC ACTIVITIES: CPT

## 2023-08-21 PROCEDURE — 97110 THERAPEUTIC EXERCISES: CPT

## 2023-08-21 PROCEDURE — 97112 NEUROMUSCULAR REEDUCATION: CPT

## 2023-08-21 NOTE — PROGRESS NOTES
Daily Note     Today's date: 2023  Patient name: Maritza Grover  : 1950  MRN: [id number]  Referring provider: Susy Patino PA-C  Dx:   Encounter Diagnosis     ICD-10-CM    1. Sciatica of right side  M54.31       2. Gait abnormality  R26.9           Start Time: 1014  Stop Time: 1052  Total time in clinic (min): 38 minutes    Subjective: Patient reports having the same R foot pain that she had at the beginning of her last session. Patient states that her pain was tolerable this weekend until she was cooking all day yesterday and standing in one spot for a while, causing onset of symptoms. Objective: See treatment diary below      Assessment: Tolerated treatment well. Patient did not experience any onset or worsening of symptoms throughout today's session. Patient continued to respond well to repeated extensions/an extension based program. Improvements noted at the end of today's session and patient had no presence of pain. Prone on elbows being performed both at the beginning and at end of session was beneficial for the patient. Patient would benefit from continued PT      Plan: Continue per plan of care.       Diagnosis: LBP   Precautions: spinal stenosis, CKD stage 3    POC Expires:   Re-evaluation Date:    FOTO Scores/Date:   Visit Count 6 2 3 4 5/10   Manuals 8/17 8/21 8/7 8/10 8/14   LS STM        LS Mobs        LS Gapping         Hip IR/ER Mobs        Hip extension PROM        Ther Ex        Bike         LTR x10 w/ pball x20 w/ pball 20x  x10 w/ball    Posterior pelvic tilts        Clamshells   3x10 rtb 2x15 rtb   2x10 rtb    SLR    x15 ea     SL abduction        Glute bridges   2x10 hip abd iso x10 no band     Hamstring stretch    10x5" 10x5"    Modified Quadruped rotations         Supine nerve glides     20x  20x    Open Books   x20 ea side x15 ea side  15x ea side     DKTC      x15    R Piriformis St.     10x5" 10x5"    EDDIE 3 min 3' then 2' @ end of session 3' then 2' @ end of session 3' then 2' @ end of session 3'    Standing lumbar extensions    20x @ wall      Seated Lumbar Extensions 2x10 2x10      PPU 2x10 2x10 2x10  2x10 2x10    Patient education        HEP creation        Neuro Re-Ed        TrA bracing 20x3" 20x3" 20x3"     Supine tapdowns    x10     Supine marches     x10     BKFO  3x10 RTB 3x10 RTB 3x10 rtb      Standing hip abd x10 ea x20 x10  x10 ea  x10 ea   Standing hip ext  x10 ea x20 x10  x10 ea  x10 ea    Paloff press 2x10 GTB   x10 gtb ea  2x10 gtb    Stir the pot  x10 gtb CW/CCW    x10 gtb CW/CCW                                                   Ther Act              step ups   2x10 4" knee drive  0Q73 4" knee drive      2B79 4" knee drive     trx squats   mini 2x10 @ table  mini 2x10 @ table      mini 2x10 @ table    Modalities                                                            [id number]

## 2023-08-24 ENCOUNTER — OFFICE VISIT (OUTPATIENT)
Dept: PHYSICAL THERAPY | Facility: CLINIC | Age: 73
End: 2023-08-24
Payer: MEDICARE

## 2023-08-24 DIAGNOSIS — R26.9 GAIT ABNORMALITY: ICD-10-CM

## 2023-08-24 DIAGNOSIS — M54.31 SCIATICA OF RIGHT SIDE: Primary | ICD-10-CM

## 2023-08-24 PROCEDURE — 97140 MANUAL THERAPY 1/> REGIONS: CPT

## 2023-08-24 PROCEDURE — 97112 NEUROMUSCULAR REEDUCATION: CPT

## 2023-08-24 PROCEDURE — 97110 THERAPEUTIC EXERCISES: CPT

## 2023-08-24 NOTE — PROGRESS NOTES
Daily Note     Today's date: 2023  Patient name: Catarina Hammond  : 1950  MRN: [id number]  Referring provider: Lynne Malone PA-C  Dx:   Encounter Diagnosis     ICD-10-CM    1. Sciatica of right side  M54.31       2. Gait abnormality  R26.9                      Subjective: Patient reports feeling good on Monday and Tuesday but yesterday she walked for around 2 hours which led to large amounts of pain which radiated into the foot. This date she notes some pain into the calf. Objective: See treatment diary below      Assessment: Tolerated treatment well. Continued to focus on lumbar extension with continued improvements noted in radicular sxs with this activity. Sidelying activities continue to reproduce some patient sxs. Performed sideglides @ wall in both sitting and standing which improved patient sxs and allowed for improved tolerance to exercise. Updated patient HEP to include these activities. Progress as able. Patient would benefit from continued PT      Plan: Continue per plan of care.       Diagnosis: LBP   Precautions: spinal stenosis, CKD stage 3    POC Expires:   Re-evaluation Date:    FOTO Scores/Date:   Visit Count 6 2 8 4 5/10   Manuals 8/17 8/21 8/24 8/10 8/14   LS STM        LS Mobs        LS Gapping    EB     Hip IR/ER Mobs        Hip extension PROM        Ther Ex        Bike         LTR x10 w/ pball x20 w/ pball   x10 w/ball    Posterior pelvic tilts        Clamshells   3x10 rtb 2x10 BW  2x10 rtb    SLR    x15 ea     SL abduction        Glute bridges    x10 no band     Hamstring stretch    10x5" 10x5"    Modified Quadruped rotations         Supine nerve glides     20x  20x    Open Books   x20 ea side  15x ea side     DKTC      x15    R Piriformis St.     10x5" 10x5"    EDDIE 3 min 3' then 2' @ end of session 3' @ end of session 3' then 2' @ end of session 3'    Standing lumbar extensions         Seated Lumbar Extensions 2x10 2x10 2x10 added sidebend      PPU 2x10 2x10 x10  2x10 2x10    Patient education        HEP creation        Neuro Re-Ed        TrA bracing 20x3" 20x3" Prone 20x3"     Supine tapdowns    x10     Supine marches     x10     BKFO  3x10 RTB 3x10 RTB      Standing hip abd x10 ea x20  x10 ea  x10 ea   Standing hip ext  x10 ea x20  x10 ea  x10 ea    Paloff press 2x10 GTB   x10 gtb ea  2x10 gtb    Stir the pot  x10 gtb CW/CCW    x10 gtb CW/CCW    Prone glute sets   20x3"     Sideglides @ wall   30x                                    Ther Act              step ups   2x10 4" knee drive  2O69 4" knee drive      3K47 4" knee drive     trx squats   mini 2x10 @ table  mini 2x10 @ table      mini 2x10 @ table    Modalities                                                            [id number]

## 2023-08-28 ENCOUNTER — OFFICE VISIT (OUTPATIENT)
Dept: PHYSICAL THERAPY | Facility: CLINIC | Age: 73
End: 2023-08-28
Payer: MEDICARE

## 2023-08-28 DIAGNOSIS — M54.31 SCIATICA OF RIGHT SIDE: Primary | ICD-10-CM

## 2023-08-28 DIAGNOSIS — R26.9 GAIT ABNORMALITY: ICD-10-CM

## 2023-08-28 PROCEDURE — 97112 NEUROMUSCULAR REEDUCATION: CPT

## 2023-08-28 PROCEDURE — 97140 MANUAL THERAPY 1/> REGIONS: CPT

## 2023-08-28 PROCEDURE — 97110 THERAPEUTIC EXERCISES: CPT

## 2023-08-28 NOTE — PROGRESS NOTES
Daily Note     Today's date: 2023  Patient name: Emani Kilpatrick  : 1950  MRN: [id number]  Referring provider: Luciano Vigil PA-C  Dx:   Encounter Diagnosis     ICD-10-CM    1. Sciatica of right side  M54.31       2. Gait abnormality  R26.9                      Subjective: Patient notes that Friday and Saturday of last week she felt good those days. Notes some increases in pain yesterday which radiated into the foot. This is present this date as well. Objective: See treatment diary below      Assessment: Tolerated treatment well. Continues to respond favorably to manuals. No sxs noted by end of session this date. Progressed resistance exercises with continued difficulty noted during hip abd although patient able to complete activity. Sideglides and repeated extensions continue to offer patient most relief. Updated HEP. Progress as able. Patient would benefit from continued PT      Plan: Continue per plan of care.       Diagnosis: LBP   Precautions: spinal stenosis, CKD stage 3    POC Expires:   Re-evaluation Date:    FOTO Scores/Date:   Visit Count 6 2 8 9 5/10   Manuals    LS STM        LS Mobs        LS Gapping    EB EB     Hip IR/ER Mobs        Hip extension PROM        Ther Ex        Bike         LTR x10 w/ pball x20 w/ pball   x10 w/ball    Posterior pelvic tilts        Clamshells   3x10 rtb 2x10 BW  2x10 rtb    SLR        SL abduction        Glute bridges        Hamstring stretch     10x5"    Prone hip extensions     2x10 ea side     Supine nerve glides      20x    Open Books   x20 ea side      DKTC      x15    R Piriformis St.      10x5"    EDDIE 3 min 3' then 2' @ end of session 3' @ end of session 3' 3'    Standing lumbar extensions     30x     Seated Lumbar Extensions 2x10 2x10 2x10 added sidebend      PPU 2x10 2x10 x10  2x10  2x10    Patient education        HEP creation        Neuro Re-Ed        TrA bracing 20x3" 20x3" Prone 20x3" Prone 20x3"    Supine tapdowns Supine marches         BKFO  3x10 RTB 3x10 RTB      Standing hip abd x10 ea x20  x10 rtb  x10 ea   Standing hip ext  x10 ea x20  x10 rtb x10 ea    Paloff press 2x10 GTB   With side step btb x10 2x10 gtb    Stir the pot  x10 gtb CW/CCW    x10 gtb CW/CCW    Prone glute sets   20x3" 20x3"    Sideglides @ wall   30x 45x                                   Ther Act             step ups   2x10 4" knee drive  3X88 4" knee drive     4D22 4" knee drive     trx squats   mini 2x10 @ table  mini 2x10 @ table     mini 2x10 @ table    Modalities

## 2023-08-31 ENCOUNTER — EVALUATION (OUTPATIENT)
Dept: PHYSICAL THERAPY | Facility: CLINIC | Age: 73
End: 2023-08-31
Payer: MEDICARE

## 2023-08-31 DIAGNOSIS — R26.9 GAIT ABNORMALITY: ICD-10-CM

## 2023-08-31 DIAGNOSIS — M54.31 SCIATICA OF RIGHT SIDE: Primary | ICD-10-CM

## 2023-08-31 PROCEDURE — 97110 THERAPEUTIC EXERCISES: CPT

## 2023-08-31 PROCEDURE — 97112 NEUROMUSCULAR REEDUCATION: CPT

## 2023-08-31 PROCEDURE — 97140 MANUAL THERAPY 1/> REGIONS: CPT

## 2023-08-31 NOTE — PROGRESS NOTES
PT Re-Evaluation     Today's date: 2023  Patient name: Jesusita Herron  : 1950  MRN: [id number]  Referring provider: Alberteen Dubin, PA-C  Dx:   Encounter Diagnosis     ICD-10-CM    1. Sciatica of right side  M54.31       2. Gait abnormality  R26.9                      Assessment  Assessment details: From RE on 23: Fidel Slade is a 81 y.o. female who presents with signs and symptoms consistent of referring diagnosis based off of subjective/objective findings. Patient has made good progress since initiating OPPT. Patient has made improvements in LE strength, lumbar ROM, and functional mobility with decreases in frequency of pain which has led to increased activity tolerance. Patient FOTO improved from 58 to 83 in 10 visits since IE, indicating this improvement in function. Patient does however continue to present with radicular sxs which are present with prolonged sitting and repeated bending in standing which impacts her quality of life. Small difference in myotomal strength present in patient DF on the RLE with intact sensation and reflexes B/L. Made patient aware of red flag signs and sxs due to her current sxs when in sitting and although no referral deemed necessary at this time, therapist will continue to assess each visit. Due to these impairments, Patient continues to have difficulty performing a/iadls, recreational activities and engaging in social activities. Patient would continue to benefit from a comprehensive HEP focusing on improving said deficits. Patient would benefit from skilled physical therapy to address the impairments, improve their level of function, and to improve their overall quality of life. PT POC 2x/wk for 6 weeks. Thank you for this referral.      From IE on 23: Jesusita Herron is a 81 y.o. female who presents with signs and symptoms consistent of lumbar radiculopathy based off of subjective/objective findings as patient was + during all provacative testing. Patient presents with pain, decreased strength, decreased ROM and ambulatory dysfunction. Due to these impairments, Patient has difficulty performing recreational activities and engaging in social activities. No referral necessary at this time as patient has intact myotomal strength, reflexes, and sensation at this time. Will continue to assess moving forwards. Patient responds favorably to prone extensions with program likely to promote extension. Patient would benefit from a comprehensive HEP focusing on improving said deficits. Patient was educated on and provided with an at home exercise program this date to be completed. Patient verbalized understanding of the importance of completion. Patient would benefit from skilled physical therapy to address the impairments, improve their level of function, and to improve their overall quality of life. PT POC 2x/wk for 6 weeks. Thank you for this referral.    Impairments: abnormal gait, abnormal or restricted ROM, activity intolerance, impaired physical strength, lacks appropriate home exercise program, pain with function and poor body mechanics  Understanding of Dx/Px/POC: good   Prognosis: good    Goals  Short Term Goals: to be achieved by 4 weeks  1) Patient to be independent with basic HEP. - met  2) Decrease pain to 5/10 at its worst.- met  3) Increase lumbar spine ROM by 25% in all deficient planes. - met   4) Increase LE strength by 1/2 MMT grade in all deficient planes. - met     Long Term Goals: to be achieved by discharge  1) FOTO equal to or greater than expected outcome. - met   2) Patient to be independent with comprehensive HEP. - progressing  3) Lumbar spine ROM WNL all planes to improve a/iadls. - progressing  4) Increase LE strength to 5/5 MMT grade in all planes to improve a/iadls. - progressing  5) Increase seated and ambulatory tolerance to 60 min without pain. - met     Plan  Patient would benefit from: skilled physical therapy  Planned therapy interventions: joint mobilization, manual therapy, massage, neuromuscular re-education, patient education, strengthening, stretching, therapeutic activities, therapeutic exercise, home exercise program, functional ROM exercises, flexibility, behavior modification, body mechanics training, activity modification and abdominal trunk stabilization  Frequency: 2x week  Duration in weeks: 6  Treatment plan discussed with: patient        Subjective Evaluation    History of Present Illness  Mechanism of injury: History of Current Injury: Patient reports this date following a referral after a recent office visit on 7/24/23 regarding low back pain which radiates into the back of the leg and into the top of her foot. This began a month ago with patient noting flare ups prior but not to this severity. Currently, the pain is most notable in her foot and ankle which began about 3 weeks ago. Has trialed both gabapentin and prednisone without major relief. Notes that when in sitting the pain is the worst as she gets pain in the buttock which shoots down into the top of her foot and lateral portion of her ankle. After sitting for a prolonged period of time, the pain levels improve. Can get some N/T on the top of the foot and ankle with some associated tingling which has been more evident in the last week. Does not note any LBP at this time. She can tolerate about 30 minutes of standing and walking although this is not comfortable. By the end of the day, patient notes that her pain is at its worst.    Pain location/Descriptors:   Aggravating factors: Sitting, walking  Easing factors: Lying flat, medication  24 HR pattern: Better in the morning and worsening as the day continues. Special Questions: Kristina Pena denies a new onset of Bladder incontinence, Bowel dysfunction, Dizziness, Dysphagia, Dysarthria, Diplopia, Ataxia, Tingling, Numbness and Saddle anesthesia .   Patient goals:  Improve pain, return to activities   Hobbies/Interest: Gardening,   Occupation: Retired  Patient Goals  Patient goals for therapy: increased strength, return to sport/leisure activities, decreased pain and increased motion    Pain  Current pain ratin  At best pain ratin  At worst pain ratin  Quality: radiating, dull ache and sharp  Aggravating factors: walking, standing and sitting      [place] Green reports a perceived improvement of 90% from IE. Patient notes that she has reductions of pain into the foot when standing and notes that she can't remember the last time she had pain while standing and cooking/cleaning and doing dishes. Patient notes pain has reduced in both intensity, with the worst being around a 5, as well as frequency. She does continue to get some lingering pain/discomfort into the foot, especially at night when sitting for prolonged periods of time. She notes that when she sits, her foot "falls" asleep and then when standing this improves but can be present for some time. Overall, patient has made steady progress toward goals and would benefit from additional PT at this time. Objective     Palpation   Left   No palpable tenderness to the erector spinae and quadratus lumborum. Right   No palpable tenderness to the erector spinae and quadratus lumborum. Tenderness     Lumbar Spine  Tenderness in the spinous process. No tenderness in the left transverse process and right transverse process. Left Hip   No tenderness in the PSIS. Right Hip   No tenderness in the PSIS.      Neurological Testing     Sensation     Lumbar   Left   Intact: light touch    Right   Intact: light touch    Reflexes   Left   Patellar (L4): normal (2+)  Achilles (S1): normal (2+)  Clonus sign: negative    Right   Patellar (L4): normal (2+)  Achilles (S1): normal (2+)  Clonus sign: negative    Active Range of Motion     Lumbar   Flexion:  Restriction level: minimal  Extension:  Restriction level: minimal  Left lateral flexion:  with pain Restriction level: minimal  Right lateral flexion:  Restriction level: minimal  Left rotation:  with pain Restriction level: minimal  Right rotation:  Restriction level: minimal    Joint Play     Hypomobile: L5 and S1     Pain: L5 and S1   Mechanical Assessment    Cervical      Thoracic    Lying extension: repeated movements  Pain location: centralized  Pain intensity: better  Pain level: abolished    Lumbar      Strength/Myotome Testing     Lumbar   Left   Heel walk: normal  Toe walk: normal    Right   Heel walk: normal  Toe walk: normal    Left Hip   Planes of Motion   Flexion: 5  Extension: 5  Abduction: 5    Right Hip   Planes of Motion   Flexion: 4  Extension: 4+  Abduction: 4    Left Knee   Flexion: 5  Extension: 5    Left Ankle/Foot   Dorsiflexion: 5  Plantar flexion: 5  Great toe extension: 5    Right Ankle/Foot   Dorsiflexion: 4+  Plantar flexion: 5  Great toe extension: 5    Tests     Lumbar   Negative SIJ compression and sacroiliac distraction. Left   Negative passive SLR. Right   Positive passive SLR. Left Pelvic Girdle/Sacrum   Negative: active SLR test.     Right Pelvic Girdle/Sacrum   Negative: active SLR test.     Left Hip   Negative BERTA and FADIR. Right Hip   Negative BERTA and FADIR.                 Diagnosis: LBP   Precautions: spinal stenosis, CKD stage 3    POC Expires:   Re-evaluation Date:    FOTO Scores/Date:   Visit Count 6 2 8 9 10/10   Manuals 8/17 8/21 8/24 8/28 8/31   LS STM        LS Mobs        LS Gapping    EB EB  EB   Hip IR/ER Mobs        Re-eval      EB   Ther Ex        Bike         LTR x10 w/ pball x20 w/ pball   20x w/ball    Posterior pelvic tilts        Clamshells   3x10 rtb 2x10 BW     SLR        SL abduction        Glute bridges        Hamstring stretch        Prone hip extensions     2x10 ea side     Supine nerve glides         Open Books   x20 ea side      DKTC      x20    R Piriformis St.         EDDIE 3 min 3' then 2' @ end of session 3' @ end of session 3' 5'    Standing lumbar extensions     30x     Seated Lumbar Extensions 2x10 2x10 2x10 added sidebend      PPU 2x10 2x10 x10  2x10  2x10    side steps      2' @ table    Patient education        HEP creation        Neuro Re-Ed        TrA bracing 20x3" 20x3" Prone 20x3" Prone 20x3"    Supine tapdowns        Supine marches         BKFO  3x10 RTB 3x10 RTB      Standing hip abd x10 ea x20  x10 rtb  x10 rtb   Standing hip ext  x10 ea x20  x10 rtb x10 rtb    Paloff press 2x10 GTB   With side step btb x10    Stir the pot  x10 gtb CW/CCW       Prone glute sets   20x3" 20x3"    Sideglides @ wall   30x 45x     Hip hinge     x10 dowel                         Ther Act            step ups   2x10 4" knee drive  3Y39 4" knee drive        trx squats   mini 2x10 @ table  mini 2x10 @ table       Modalities

## 2023-09-05 ENCOUNTER — OFFICE VISIT (OUTPATIENT)
Dept: PHYSICAL THERAPY | Facility: CLINIC | Age: 73
End: 2023-09-05
Payer: MEDICARE

## 2023-09-05 DIAGNOSIS — R26.9 GAIT ABNORMALITY: ICD-10-CM

## 2023-09-05 DIAGNOSIS — M54.31 SCIATICA OF RIGHT SIDE: Primary | ICD-10-CM

## 2023-09-05 PROCEDURE — 97110 THERAPEUTIC EXERCISES: CPT

## 2023-09-05 PROCEDURE — 97112 NEUROMUSCULAR REEDUCATION: CPT

## 2023-09-05 PROCEDURE — 97530 THERAPEUTIC ACTIVITIES: CPT

## 2023-09-07 ENCOUNTER — OFFICE VISIT (OUTPATIENT)
Dept: PHYSICAL THERAPY | Facility: CLINIC | Age: 73
End: 2023-09-07
Payer: MEDICARE

## 2023-09-07 DIAGNOSIS — M54.31 SCIATICA OF RIGHT SIDE: Primary | ICD-10-CM

## 2023-09-07 DIAGNOSIS — R26.9 GAIT ABNORMALITY: ICD-10-CM

## 2023-09-07 PROCEDURE — 97110 THERAPEUTIC EXERCISES: CPT

## 2023-09-07 PROCEDURE — 97140 MANUAL THERAPY 1/> REGIONS: CPT

## 2023-09-07 PROCEDURE — 97112 NEUROMUSCULAR REEDUCATION: CPT

## 2023-09-07 NOTE — PROGRESS NOTES
Daily Note     Today's date: 2023  Patient name: Barrington Veras  : 1950  MRN: [id number]  Referring provider: Marjan Mccarthy PA-C  Dx:   Encounter Diagnosis     ICD-10-CM    1. Sciatica of right side  M54.31       2. Gait abnormality  R26.9           Start Time: 1015  Stop Time: 1059  Total time in clinic (min): 44 minutes    Subjective: Patient reports that her pain continues to be under control over and that she has had a very good couple of days with minimal symptoms. Patient notes when she sat down earlier this morning she experienced an onset of R glute pain that eventually radiated down her leg and caused numbness/tingling in her foot. Objective: See treatment diary below      Assessment: Tolerated treatment well. Began session with prone extension exercises since patient responds well and experiences relief afterward. Added piriformis stretch today to improve noted R glute tightness. Verbal cues needed during mini squats to keep knees behind toes and to maintain proper form of exercise. Resisted exercises were tolerable for patient, with resisted retro walking being more challenging and causing more muscle fatigue than resisted forward walk. Manual LS gapping continues to give patient relief at the conclusion of session. Progress as able. Patient would benefit from continued PT      Plan: Continue per plan of care.       Diagnosis: LBP   Precautions: spinal stenosis, CKD stage 3    POC Expires:   Re-evaluation Date:    FOTO Scores/Date:   Visit Count 1 2 8 9 10/10   Manuals    LS STM        LS Mobs        LS Gapping  EB KA EB EB  EB   Hip IR/ER Mobs        Re-eval      EB   Ther Ex        Bike         LTR  20 w/ ball   20x w/ball    Posterior pelvic tilts        Clamshells    2x10 BW     SLR        SL abduction        Glute bridges        Hamstring stretch        Prone hip extensions     2x10 ea side     Supine nerve glides         Open Books   x20 ea side      DKTC x20    R Piriformis St.   10x5"      EDDIE 3' 3'  3' @ end of session 3' 5'    Standing lumbar extensions     30x     Seated Lumbar Extensions   2x10 added sidebend      PPU x10  x10 x10  2x10  2x10    side steps  5 laps @ table rtb  3 laps @ mirror rtb   2' @ table    Patient education        HEP creation        Neuro Re-Ed        TrA bracing   Prone 20x3" Prone 20x3"    Supine tapdowns        Supine marches         BKFO         Standing hip abd  20x ea rtb  x10 rtb  x10 rtb   Standing hip ext  20x ea rtb  20x ea rtb  x10 rtb x10 rtb    Paloff press x15 gtb    With side step btb x10    Stir the pot         Prone glute sets  20x3" 20x3" 20x3"    Sideglides @ wall 30x  30x 30x 45x     Hip hinge 2x15 7.5#     x10 dowel    Trunk rotations x10 ea side rtb                    Ther Act          Resisted fwd walk x10 7.5#  x10 7.5#      Retro walk x10 15# x10 10#      [place] carry  2 laps 5# ea hand        step ups            trx squats  2x10 @ table   2x10 @mirror       Modalities                                                      [id number]

## 2023-09-11 ENCOUNTER — OFFICE VISIT (OUTPATIENT)
Dept: PHYSICAL THERAPY | Facility: CLINIC | Age: 73
End: 2023-09-11
Payer: MEDICARE

## 2023-09-11 DIAGNOSIS — R26.9 GAIT ABNORMALITY: ICD-10-CM

## 2023-09-11 DIAGNOSIS — M54.31 SCIATICA OF RIGHT SIDE: Primary | ICD-10-CM

## 2023-09-11 PROCEDURE — 97110 THERAPEUTIC EXERCISES: CPT

## 2023-09-11 PROCEDURE — 97140 MANUAL THERAPY 1/> REGIONS: CPT

## 2023-09-11 PROCEDURE — 97112 NEUROMUSCULAR REEDUCATION: CPT

## 2023-09-11 NOTE — PROGRESS NOTES
Daily Note     Today's date: 2023  Patient name: Keyonna Braden  : 1950  MRN: [id number]  Referring provider: Peace Fishman PA-C  Dx:   Encounter Diagnosis     ICD-10-CM    1. Sciatica of right side  M54.31       2. Gait abnormality  R26.9           Start Time: 1215  Stop Time: 1258  Total time in clinic (min): 43 minutes    Subjective: Patient reports onset of elevated L foot pain and numbness over the past couple of days even though she did not doing anything differently that would have triggered symptoms. Patient notes that her pain was at it's worst all day and night yesterday and that she could not find relief with any intervention that she tried. Patient has slight improvements in symptoms today, but still has increased foot pain. Objective: See treatment diary below      Assessment: Tolerated treatment well. Patient did not experience any worsening of symptoms throughout today's session, and instead experienced relief and improvements in symptoms with increased activity during exercises. Resistive walking exercises resulted in increased fatigue levels for patient but overall was tolerable for patient to adequately complete. Patient continued to respond favorably to manual interventions and noted improvements at the conclusion of session. Progress as able. Patient demonstrated fatigue post treatment, exhibited good technique with therapeutic exercises and would benefit from continued PT       Plan: Continue per plan of care. Progress treatment as tolerated. Decrease frequency of PT sessions to 1x a week moving forward, in attempt to rule out duration of sessions being negatively correlated to an increase in patient symptoms.      Diagnosis: LBP   Precautions: spinal stenosis, CKD stage 3    POC Expires:   Re-evaluation Date:    FOTO Scores/Date:   Visit Count 1 2 3 9 10/10   Manuals    LS STM        LS Mobs        LS Gapping  EB KA KA EB  EB   Hip IR/ER Mobs Re-eval      EB   Ther Ex        Bike         LTR  20 w/ ball 20x w/ ball  20x w/ball    Posterior pelvic tilts        Clamshells         SLR        SL abduction        Glute bridges        Hamstring stretch        Prone hip extensions     2x10 ea side     Supine nerve glides         Open Books   x20 ea side x20 ea side     DKTC      x20    R Piriformis St.   10x5" 10x5"     EDDIE 3' 3'  3' 3' 5'    Standing lumbar extensions     30x     Seated Lumbar Extensions        PPU x10  x10 x10 2x10  2x10    side steps  5 laps @ table rtb  3 laps @ mirror rtb 5 laps @ table rtb  2' @ table    Patient education        HEP creation        Neuro Re-Ed        TrA bracing    Prone 20x3"    Supine tapdowns        Supine marches         BKFO         Standing hip abd  20x ea rtb 20x ea rtb x10 rtb  x10 rtb   Standing hip ext  20x ea rtb  20x ea rtb 20x ea rtb x10 rtb x10 rtb    Paloff press x15 gtb   x15 gtb With side step btb x10    Stir the pot    x15 ea CW/CCW gtb     Prone glute sets  20x3"  20x3"    Sideglides @ wall 30x  30x 30x 45x     Hip hinge 2x15 7.5#     x10 dowel    Trunk rotations x10 ea side rtb   x10 ea side gtb                 Ther Act          Resisted fwd walk x10 7.5#  x10 7.5# x10 7.5#     Retro walk x10 15# x10 10# x10 10#     [place] carry  2 laps 5# ea hand  2 laps 5# ea hand      step ups            trx squats  2x10 @ table   2x10 @mirror  2x10 @ table     Modalities                                                    [id number]

## 2023-09-14 ENCOUNTER — HOSPITAL ENCOUNTER (OUTPATIENT)
Dept: RADIOLOGY | Facility: MEDICAL CENTER | Age: 73
Discharge: HOME | End: 2023-09-14
Payer: MEDICARE

## 2023-09-14 ENCOUNTER — OFFICE VISIT (OUTPATIENT)
Dept: PHYSICAL THERAPY | Facility: CLINIC | Age: 73
End: 2023-09-14
Payer: MEDICARE

## 2023-09-14 VITALS — HEIGHT: 66 IN | WEIGHT: 180 LBS | BODY MASS INDEX: 28.93 KG/M2

## 2023-09-14 DIAGNOSIS — Z12.31 ENCOUNTER FOR SCREENING MAMMOGRAM FOR MALIGNANT NEOPLASM OF BREAST: ICD-10-CM

## 2023-09-14 DIAGNOSIS — M54.31 SCIATICA OF RIGHT SIDE: Primary | ICD-10-CM

## 2023-09-14 DIAGNOSIS — R26.9 GAIT ABNORMALITY: ICD-10-CM

## 2023-09-14 PROCEDURE — 77063 BREAST TOMOSYNTHESIS BI: CPT

## 2023-09-14 PROCEDURE — 97140 MANUAL THERAPY 1/> REGIONS: CPT

## 2023-09-14 PROCEDURE — 77067 SCR MAMMO BI INCL CAD: CPT

## 2023-09-14 PROCEDURE — 97110 THERAPEUTIC EXERCISES: CPT

## 2023-09-14 PROCEDURE — 97112 NEUROMUSCULAR REEDUCATION: CPT

## 2023-09-14 NOTE — PROGRESS NOTES
Daily Note     Today's date: 2023  Patient name: Maria E Garcia  : 1950  MRN: [id number]  Referring provider: Miryam Brown PA-C  Dx:   Encounter Diagnosis     ICD-10-CM    1. Sciatica of right side  M54.31       2. Gait abnormality  R26.9                      Subjective: Patient notes improvement in ankle pain since last visit. Notes that there are specific movements and pin point tenderness in the lateral ankle which she feels is not related to her back. Objective: See treatment diary below      Assessment: Tolerated treatment well. Briefly assessed R ankle this date with TTP along the ATFL present on the R ankle. TTP also noted along the R lateral malleoli. No referral deemed necessary. Some increased neural tension present when biasing peroneal nerve. Added gentle ankle strengthening and nerve glides to HEP. Some difficulty with increased load of lumbar spine during paloff press but able to alleviate with repeated extensions at wall. Progress as able. Patient would benefit from continued PT      Plan: Continue per plan of care.       Diagnosis: LBP   Precautions: spinal stenosis, CKD stage 3    POC Expires:   Re-evaluation Date:    FOTO Scores/Date:   Visit Count 1 2 3 4 10/10   Manuals    LS STM        LS Mobs        LS Gapping  EB KA KA  EB   Ankle assessment    EB    Hip IR/ER Mobs        Re-eval      EB   Ther Ex        Treadmill    5' 1.5 mph     LTR  20 w/ ball 20x w/ ball  20x w/ball    Posterior pelvic tilts        Clamshells         SLR        SL abduction        Glute bridges        Hamstring stretch        Prone hip extensions         Supine nerve glides     x30 added peroneal area     Open Books   x20 ea side x20 ea side     DKTC      x20    R Piriformis St.   10x5" 10x5"     EDDIE 3' 3'  3'  5'    Standing lumbar extensions     x20     Seated Lumbar Extensions        PPU x10  x10 x10  2x10    side steps  5 laps @ table rtb  3 laps @ mirror rtb 5 laps @ table rtb  2' @ table    Resisted eversion    btb 20x    TC mobs    Stair 20x     HEP creation        Neuro Re-Ed        TrA bracing        Supine tapdowns        Supine marches         BKFO         Standing hip abd  20x ea rtb 20x ea rtb  x10 rtb   Standing hip ext  20x ea rtb  20x ea rtb 20x ea rtb  x10 rtb    Paloff press x15 gtb   x15 gtb x10 ea direction 7.5#     Stir the pot    x15 ea CW/CCW gtb x10 CW/CCW 5#    Prone glute sets  20x3"      Sideglides @ wall 30x  30x 30x     Hip hinge 2x15 7.5#     x10 dowel    Trunk rotations x10 ea side rtb   x10 ea side gtb      Pulldown with brace      2x10 12.5#      Ther Act          Resisted fwd walk x10 7.5#  x10 7.5# x10 7.5#     Retro walk x10 15# x10 10# x10 10#     [place] carry  2 laps 5# ea hand  2 laps 5# ea hand      step ups            trx squats  2x10 @ table   2x10 @mirror  2x10 @ table     Modalities

## 2023-09-16 DIAGNOSIS — E78.5 HYPERLIPIDEMIA, UNSPECIFIED HYPERLIPIDEMIA TYPE: ICD-10-CM

## 2023-09-18 ENCOUNTER — OFFICE VISIT (OUTPATIENT)
Dept: PHYSICAL THERAPY | Facility: CLINIC | Age: 73
End: 2023-09-18
Payer: MEDICARE

## 2023-09-18 DIAGNOSIS — M54.31 SCIATICA OF RIGHT SIDE: Primary | ICD-10-CM

## 2023-09-18 DIAGNOSIS — R26.9 GAIT ABNORMALITY: ICD-10-CM

## 2023-09-18 PROCEDURE — 97140 MANUAL THERAPY 1/> REGIONS: CPT

## 2023-09-18 PROCEDURE — 97112 NEUROMUSCULAR REEDUCATION: CPT

## 2023-09-18 PROCEDURE — 97110 THERAPEUTIC EXERCISES: CPT

## 2023-09-18 RX ORDER — SIMVASTATIN 20 MG
TABLET ORAL
Qty: 90 TABLET | Refills: 1 | Status: SHIPPED | OUTPATIENT
Start: 2023-09-18

## 2023-09-18 NOTE — PROGRESS NOTES
Daily Note     Today's date: 2023  Patient name: Elen Johns  : 1950  MRN: [id number]  Referring provider: Evelyne Baird PA-C  Dx:   Encounter Diagnosis     ICD-10-CM    1. Sciatica of right side  M54.31       2. Gait abnormality  R26.9           Start Time: 1014  Stop Time: 1057  Total time in clinic (min): 43 minutes      Subjective: Patient reports noticable improvements in R ankle pain since last session. Patient notes addition of ankle exercises last session were very beneficial for her and is happy having no c/o ankle pain over the weekend. Objective: See treatment diary below      Assessment: Tolerated treatment well. Patient did not experience any onset of symptoms throughout today's session. Continued to focus on both ankle and lumbar strengthening and stabilization exercises to promote improvements in patient's overall symptoms. Patient continues to respond favorably to manual interventions and experiences relief afterward. Muscle fatigue noted at the conclusion of session. Patient exhibited good technique with therapeutic exercises and would benefit from continued PT      Plan: Continue per plan of care. Progress treatment as tolerated.        Diagnosis: LBP   Precautions: spinal stenosis, CKD stage 3    POC Expires:   Re-evaluation Date:   FOTO Scores/Date:   Visit Count 1 2 3 4 5   Manuals    LS STM        LS Mobs        LS Gapping  EB KA KA  KA   Ankle assessment    EB    Hip IR/ER Mobs        Re-eval         Ther Ex        Treadmill    5' 1.5 mph  5' 1.5 mph   LTR  20 w/ ball 20x w/ ball     Posterior pelvic tilts        Clamshells         SLR        SL abduction        Glute bridges        Hamstring stretch        Prone hip extensions         Supine nerve glides     x30 added peroneal area  x30 added peroneal area    Open Books   x20 ea side x20 ea side     DKTC         R Piriformis St.   10x5" 10x5"     EDDIE 3' 3'  3'  3'   Standing lumbar extensions x20  x20   Seated Lumbar Extensions        PPU x10  x10 x10  X10   side steps  5 laps @ table rtb  3 laps @ mirror rtb 5 laps @ table rtb     Resisted eversion    btb 20x BTB 20x   TC mobs    Stair 20x  Stair 20x   HEP creation        Neuro Re-Ed        TrA bracing        Supine tapdowns        Supine marches         BKFO         Standing hip abd  20x ea rtb 20x ea rtb  10x ea GTB   Standing hip ext  20x ea rtb  20x ea rtb 20x ea rtb  10x ea GTB   Paloff press x15 gtb   x15 gtb x10 ea direction 7.5#  x10 ea direction 7.5#    Stir the pot    x15 ea CW/CCW gtb x10 CW/CCW 5# x10 CW/CCW 5#   Prone glute sets  20x3"      Sideglides @ wall 30x  30x 30x     Hip hinge 2x15 7.5#        Trunk rotations x10 ea side rtb   x10 ea side gtb      Pulldown with brace      2x10 12.5#  2x10 12.5#       Ther Act          Resisted fwd walk x10 7.5#  x10 7.5# x10 7.5#     Retro walk x10 15# x10 10# x10 10#     [place] carry  2 laps 5# ea hand  2 laps 5# ea hand      step ups            trx squats  2x10 @ table   2x10 @mirror  2x10 @ table  2x10 @ table   Modalities                                                    [id number]

## 2023-09-19 ENCOUNTER — OFFICE VISIT (OUTPATIENT)
Dept: FAMILY MEDICINE CLINIC | Facility: CLINIC | Age: 73
End: 2023-09-19
Payer: MEDICARE

## 2023-09-19 VITALS
HEART RATE: 114 BPM | BODY MASS INDEX: 28.28 KG/M2 | HEIGHT: 66 IN | DIASTOLIC BLOOD PRESSURE: 70 MMHG | RESPIRATION RATE: 16 BRPM | SYSTOLIC BLOOD PRESSURE: 102 MMHG | OXYGEN SATURATION: 98 % | WEIGHT: 176 LBS | TEMPERATURE: 97.9 F

## 2023-09-19 DIAGNOSIS — U07.1 COVID: ICD-10-CM

## 2023-09-19 DIAGNOSIS — B34.9 VIRAL SYNDROME: Primary | ICD-10-CM

## 2023-09-19 LAB
SARS-COV-2 AG UPPER RESP QL IA: POSITIVE
VALID CONTROL: ABNORMAL

## 2023-09-19 PROCEDURE — 87811 SARS-COV-2 COVID19 W/OPTIC: CPT | Performed by: PHYSICIAN ASSISTANT

## 2023-09-19 PROCEDURE — 99213 OFFICE O/P EST LOW 20 MIN: CPT | Performed by: PHYSICIAN ASSISTANT

## 2023-09-19 RX ORDER — NIRMATRELVIR AND RITONAVIR 150-100 MG
2 KIT ORAL 2 TIMES DAILY
Qty: 20 TABLET | Refills: 0 | Status: SHIPPED | OUTPATIENT
Start: 2023-09-19 | End: 2023-09-24

## 2023-09-19 NOTE — PROGRESS NOTES
Assessment/Plan:     Diagnoses and all orders for this visit:    Viral syndrome  Comments:  Rest increase fluids over-the-counter supportive care  Orders:  -     POCT Rapid Covid Ag    COVID  Comments:  Flovent ordered. Patient to hold simvastatin while on this medication  Orders:  -     nirmatrelvir & ritonavir (Paxlovid, 150/100,) tablet therapy pack; Take 2 tablets by mouth 2 (two) times a day for 5 days Take 1 nirmatrelvir tablet + 1 ritonavir tablet together per dose          Subjective:      Patient ID: Cory Vuong is a 81 y.o. female. Here for cough, chills, chest pain from coughing, and SOB since last night, 9/18. C/o nonproductive cough. Denies any wheezing. Could barely get out of bed this morning. C/o occasional PND. Gets short of breath and chest tightness from cough. Cough comes from deep in chest. No fevers. Had chills last night, continue into today. Sore throat, she thinks its from all the coughing. Feeling short of breath right now, hasn't been up moving much today. Negative at home COVID tests, twice yesterday, once in morning, once before bed. Has not tested today. Her son was sick over the weekend  With  Similar symptoms.     Sick  As well      The following portions of the patient's history were reviewed and updated as appropriate:   She   Patient Active Problem List    Diagnosis Date Noted   • Stage 3 chronic kidney disease, unspecified whether stage 3a or 3b CKD ([facility]) 12/30/2022   • Sciatica of right side 06/09/2020   • Renal insufficiency 01/28/2020   • Screening for breast cancer 09/28/2018   • Vitamin D deficiency 09/25/2013   • Benign hypertension with CKD (chronic kidney disease) stage III ([address] ) 06/18/2012   • Hyperlipidemia 06/18/2012   • Hypothyroidism 06/18/2012   • Osteopenia 06/18/2012     Current Outpatient Medications   Medication Sig Dispense Refill   • Calcium Citrate-Vitamin D 315-250 MG-UNIT TABS Take by mouth     • Cholecalciferol (VITAMIN D3) 1000 units CAPS Take 1 capsule by mouth daily     • coenzyme Q-10 100 MG capsule Take by mouth     • docusate sodium (COLACE) 100 mg capsule Take 100 mg by mouth 2 (two) times a day     • levothyroxine 88 mcg tablet TAKE 1 TABLET BY MOUTH EVERY DAY 90 tablet 1   • lisinopril-hydrochlorothiazide (PRINZIDE,ZESTORETIC) 10-12.5 MG per tablet TAKE 1 TABLET BY MOUTH EVERY DAY 90 tablet 1   • meclizine (ANTIVERT) 25 mg tablet Take 1 tablet (25 mg total) by mouth every 8 (eight) hours as needed for dizziness 30 tablet 0   • nirmatrelvir & ritonavir (Paxlovid, 150/100,) tablet therapy pack Take 2 tablets by mouth 2 (two) times a day for 5 days Take 1 nirmatrelvir tablet + 1 ritonavir tablet together per dose 20 tablet 0   • Omega-3 Fatty Acids (FISH OIL) 645 MG CAPS Take by mouth     • Polyethylene Glycol 3350 (MIRALAX PO) Take by mouth     • simvastatin (ZOCOR) 20 mg tablet TAKE 1 TABLET BY MOUTH EVERY DAY 90 tablet 1   • bisacodyl (DULCOLAX) 5 mg EC tablet Take 2 tablets (10 mg total) by mouth once for 1 dose 2 tablet 0   • gabapentin (NEURONTIN) 100 mg capsule TAKE 1 CAPSULE BY MOUTH THREE TIMES DAILY AS NEEDED FOR PAIN 90 capsule 0     No current facility-administered medications for this visit. She has No Known Allergies. .    Review of Systems   Constitutional: Positive for chills and fatigue. Negative for fever. HENT: Positive for congestion, postnasal drip, rhinorrhea and sore throat. Negative for ear pain, sinus pressure and sneezing. Respiratory: Positive for cough, chest tightness and shortness of breath. Cardiovascular: Negative for chest pain and leg swelling. Gastrointestinal: Negative for abdominal pain, nausea and vomiting. Genitourinary: Negative for difficulty urinating. Musculoskeletal: Positive for myalgias. Neurological: Positive for dizziness (has vertigo). Negative for headaches. Hematological: Negative for adenopathy. Psychiatric/Behavioral: Negative for sleep disturbance. Objective:        Physical Exam  Vitals and nursing note reviewed. Constitutional:       Appearance: Normal appearance. She is ill-appearing. She is not toxic-appearing. HENT:      Head: Normocephalic and atraumatic. Right Ear: Tympanic membrane, ear canal and external ear normal.      Left Ear: Tympanic membrane, ear canal and external ear normal.      Nose: Congestion present. Mouth/Throat:      Pharynx: Posterior oropharyngeal erythema present. No oropharyngeal exudate. Eyes:      General:         Right eye: No discharge. Left eye: No discharge. Conjunctiva/sclera: Conjunctivae normal.      Pupils: Pupils are equal, round, and reactive to light. Cardiovascular:      Rate and Rhythm: Normal rate and regular rhythm. Heart sounds: Normal heart sounds. No murmur heard. Pulmonary:      Effort: No respiratory distress. Breath sounds: Normal breath sounds. No wheezing or rhonchi. Comments: Breathing heavily  Abdominal:      General: Bowel sounds are normal.      Tenderness: There is no abdominal tenderness. Musculoskeletal:      Cervical back: No tenderness. Right lower leg: No edema. Left lower leg: No edema. Lymphadenopathy:      Cervical: No cervical adenopathy. Skin:     General: Skin is warm and dry. Neurological:      General: No focal deficit present. Mental Status: She is alert and oriented to person, place, and time. Psychiatric:         Mood and Affect: Mood normal.         Behavior: Behavior normal.         Thought Content:  Thought content normal.         Judgment: Judgment normal.

## 2023-09-27 DIAGNOSIS — I10 ESSENTIAL HYPERTENSION: ICD-10-CM

## 2023-09-27 RX ORDER — LISINOPRIL AND HYDROCHLOROTHIAZIDE 12.5; 1 MG/1; MG/1
TABLET ORAL
Qty: 90 TABLET | Refills: 1 | Status: SHIPPED | OUTPATIENT
Start: 2023-09-27

## 2023-09-28 ENCOUNTER — APPOINTMENT (OUTPATIENT)
Dept: PHYSICAL THERAPY | Facility: CLINIC | Age: 73
End: 2023-09-28
Payer: MEDICARE

## 2023-10-03 ENCOUNTER — OFFICE VISIT (OUTPATIENT)
Dept: FAMILY MEDICINE CLINIC | Facility: CLINIC | Age: 73
End: 2023-10-03
Payer: MEDICARE

## 2023-10-03 VITALS
SYSTOLIC BLOOD PRESSURE: 118 MMHG | WEIGHT: 182 LBS | DIASTOLIC BLOOD PRESSURE: 68 MMHG | BODY MASS INDEX: 29.25 KG/M2 | HEART RATE: 87 BPM | TEMPERATURE: 97.2 F | HEIGHT: 66 IN | OXYGEN SATURATION: 96 %

## 2023-10-03 DIAGNOSIS — R05.8 POST-VIRAL COUGH SYNDROME: ICD-10-CM

## 2023-10-03 DIAGNOSIS — M79.652 PAIN OF LEFT THIGH: Primary | ICD-10-CM

## 2023-10-03 PROCEDURE — 99213 OFFICE O/P EST LOW 20 MIN: CPT | Performed by: PHYSICIAN ASSISTANT

## 2023-10-03 RX ORDER — PREDNISONE 10 MG/1
TABLET ORAL
Qty: 21 TABLET | Refills: 0 | Status: SHIPPED | OUTPATIENT
Start: 2023-10-03

## 2023-10-03 NOTE — PROGRESS NOTES
Assessment/Plan:     Diagnoses and all orders for this visit:    Pain of left thigh  Comments:  Disown taper. Suspect iliotibial strain. Need physical therapy  Orders:  -     predniSONE 10 mg tablet; 6,5,4,3,2,1    Post-viral cough syndrome  Comments:  Over-the-counter supportive care          Subjective:      Patient ID: Kendell Winn is a 81 y.o. female. Presents in the office with the onset of left anterior thigh pain. States on Sunday she was in her kitchen and went to reach overhead into a cabinet. Had a sharp shooting pain from her lower back into her anterior thigh. Has urinary stress incontinence this is not new. No bowel incontinence. Paresthesias in her feet. Over-the-counter meds taken. States she is still experiencing a cough. She had COVID several weeks ago. Did take Paxlovid.       The following portions of the patient's history were reviewed and updated as appropriate:   She   Patient Active Problem List    Diagnosis Date Noted   • Stage 3 chronic kidney disease, unspecified whether stage 3a or 3b CKD ([facility]) 12/30/2022   • Sciatica of right side 06/09/2020   • Renal insufficiency 01/28/2020   • Screening for breast cancer 09/28/2018   • Vitamin D deficiency 09/25/2013   • Benign hypertension with CKD (chronic kidney disease) stage III ([facility]) 06/18/2012   • Hyperlipidemia 06/18/2012   • Hypothyroidism 06/18/2012   • Osteopenia 06/18/2012     Current Outpatient Medications   Medication Sig Dispense Refill   • Calcium Citrate-Vitamin D 315-250 MG-UNIT TABS Take by mouth     • Cholecalciferol (VITAMIN D3) 1000 units CAPS Take 1 capsule by mouth daily     • coenzyme Q-10 100 MG capsule Take by mouth     • docusate sodium (COLACE) 100 mg capsule Take 100 mg by mouth 2 (two) times a day     • levothyroxine 88 mcg tablet TAKE 1 TABLET BY MOUTH EVERY DAY 90 tablet 1   • lisinopril-hydrochlorothiazide (PRINZIDE,ZESTORETIC) 10-12.5 MG per tablet TAKE 1 TABLET BY MOUTH EVERY DAY 90 tablet 1   • meclizine (ANTIVERT) 25 mg tablet Take 1 tablet (25 mg total) by mouth every 8 (eight) hours as needed for dizziness 30 tablet 0   • Omega-3 Fatty Acids (FISH OIL) 645 MG CAPS Take by mouth     • Polyethylene Glycol 3350 (MIRALAX PO) Take by mouth     • predniSONE 10 mg tablet 6,5,4,3,2,1 21 tablet 0   • simvastatin (ZOCOR) 20 mg tablet TAKE 1 TABLET BY MOUTH EVERY DAY 90 tablet 1   • bisacodyl (DULCOLAX) 5 mg EC tablet Take 2 tablets (10 mg total) by mouth once for 1 dose 2 tablet 0     No current facility-administered medications for this visit. She has No Known Allergies. .    Review of Systems   Constitutional: Negative for activity change, appetite change, chills, fatigue and fever. HENT: Negative for ear pain, postnasal drip, rhinorrhea, sinus pressure, sinus pain and sore throat. Respiratory: Positive for cough. Negative for shortness of breath. Musculoskeletal: Positive for gait problem. Left  Thigh  Pain           Objective:        Physical Exam  Constitutional:       General: She is not in acute distress. Appearance: She is well-developed. She is not diaphoretic. HENT:      Head: Normocephalic and atraumatic. Right Ear: Tympanic membrane, ear canal and external ear normal.      Left Ear: Tympanic membrane, ear canal and external ear normal.      Mouth/Throat:      Pharynx: No posterior oropharyngeal erythema. Eyes:      Conjunctiva/sclera: Conjunctivae normal.      Pupils: Pupils are equal, round, and reactive to light. Neck:      Thyroid: No thyromegaly. Vascular: No carotid bruit. Cardiovascular:      Rate and Rhythm: Normal rate and regular rhythm. Heart sounds: Normal heart sounds. No murmur heard. No friction rub. No gallop. Pulmonary:      Effort: Pulmonary effort is normal. No respiratory distress. Breath sounds: Normal breath sounds. No wheezing. Musculoskeletal:      Right lower leg: No edema. Left lower leg: No edema.       Comments: Lumbar spine nontender. Buttocks nontender. Hip nontender with good range of motion. Does have tenderness in the anterior thigh in the lateral thigh. Had pain when we stretch the iliotibial band. Lymphadenopathy:      Cervical: No cervical adenopathy. Skin:     General: Skin is warm and dry. Findings: No erythema or rash. Neurological:      General: No focal deficit present. Mental Status: She is alert and oriented to person, place, and time. Psychiatric:         Mood and Affect: Mood normal.         Behavior: Behavior normal.         Thought Content:  Thought content normal.         Judgment: Judgment normal.

## 2023-11-22 DIAGNOSIS — E03.9 HYPOTHYROIDISM, UNSPECIFIED TYPE: ICD-10-CM

## 2023-11-22 RX ORDER — LEVOTHYROXINE SODIUM 88 UG/1
TABLET ORAL
Qty: 90 TABLET | Refills: 1 | Status: SHIPPED | OUTPATIENT
Start: 2023-11-22

## 2023-12-28 ENCOUNTER — APPOINTMENT (OUTPATIENT)
Dept: LAB | Facility: CLINIC | Age: 73
End: 2023-12-28
Payer: MEDICARE

## 2023-12-28 DIAGNOSIS — E78.2 MIXED HYPERLIPIDEMIA: ICD-10-CM

## 2023-12-28 DIAGNOSIS — I12.9 BENIGN HYPERTENSION WITH CKD (CHRONIC KIDNEY DISEASE) STAGE III (HCC): ICD-10-CM

## 2023-12-28 DIAGNOSIS — N18.30 STAGE 3 CHRONIC KIDNEY DISEASE, UNSPECIFIED WHETHER STAGE 3A OR 3B CKD (HCC): ICD-10-CM

## 2023-12-28 DIAGNOSIS — N18.30 BENIGN HYPERTENSION WITH CKD (CHRONIC KIDNEY DISEASE) STAGE III (HCC): ICD-10-CM

## 2023-12-28 DIAGNOSIS — E03.9 HYPOTHYROIDISM, UNSPECIFIED TYPE: ICD-10-CM

## 2023-12-28 LAB
ALBUMIN SERPL BCP-MCNC: 4.1 G/DL (ref 3.5–5)
ALP SERPL-CCNC: 53 U/L (ref 34–104)
ALT SERPL W P-5'-P-CCNC: 11 U/L (ref 7–52)
ANION GAP SERPL CALCULATED.3IONS-SCNC: 7 MMOL/L
AST SERPL W P-5'-P-CCNC: 13 U/L (ref 13–39)
BILIRUB SERPL-MCNC: 0.65 MG/DL (ref 0.2–1)
BUN SERPL-MCNC: 19 MG/DL (ref 5–25)
CALCIUM SERPL-MCNC: 9.6 MG/DL (ref 8.4–10.2)
CHLORIDE SERPL-SCNC: 101 MMOL/L (ref 96–108)
CHOLEST SERPL-MCNC: 169 MG/DL
CO2 SERPL-SCNC: 29 MMOL/L (ref 21–32)
CREAT SERPL-MCNC: 1.02 MG/DL (ref 0.6–1.3)
GFR SERPL CREATININE-BSD FRML MDRD: 55 ML/MIN/1.73SQ M
GLUCOSE P FAST SERPL-MCNC: 94 MG/DL (ref 65–99)
HDLC SERPL-MCNC: 56 MG/DL
LDLC SERPL CALC-MCNC: 79 MG/DL (ref 0–100)
NONHDLC SERPL-MCNC: 113 MG/DL
POTASSIUM SERPL-SCNC: 4.2 MMOL/L (ref 3.5–5.3)
PROT SERPL-MCNC: 7.3 G/DL (ref 6.4–8.4)
SODIUM SERPL-SCNC: 137 MMOL/L (ref 135–147)
T3 SERPL-MCNC: 2 NG/ML
T4 SERPL-MCNC: 9.26 UG/DL (ref 6.09–12.23)
TRIGL SERPL-MCNC: 172 MG/DL
TSH SERPL DL<=0.05 MIU/L-ACNC: 1.94 UIU/ML (ref 0.45–4.5)

## 2023-12-28 PROCEDURE — 84436 ASSAY OF TOTAL THYROXINE: CPT

## 2023-12-28 PROCEDURE — 80053 COMPREHEN METABOLIC PANEL: CPT

## 2023-12-28 PROCEDURE — 80061 LIPID PANEL: CPT

## 2023-12-28 PROCEDURE — 84443 ASSAY THYROID STIM HORMONE: CPT

## 2023-12-28 PROCEDURE — 36415 COLL VENOUS BLD VENIPUNCTURE: CPT

## 2023-12-28 PROCEDURE — 84480 ASSAY TRIIODOTHYRONINE (T3): CPT

## 2024-01-08 ENCOUNTER — OFFICE VISIT (OUTPATIENT)
Dept: FAMILY MEDICINE CLINIC | Facility: CLINIC | Age: 74
End: 2024-01-08
Payer: MEDICARE

## 2024-01-08 VITALS
WEIGHT: 181.2 LBS | TEMPERATURE: 97.8 F | HEIGHT: 65 IN | HEART RATE: 63 BPM | DIASTOLIC BLOOD PRESSURE: 82 MMHG | SYSTOLIC BLOOD PRESSURE: 132 MMHG | OXYGEN SATURATION: 95 % | BODY MASS INDEX: 30.19 KG/M2

## 2024-01-08 DIAGNOSIS — E78.2 MIXED HYPERLIPIDEMIA: ICD-10-CM

## 2024-01-08 DIAGNOSIS — I12.9 BENIGN HYPERTENSION WITH CKD (CHRONIC KIDNEY DISEASE) STAGE III (HCC): ICD-10-CM

## 2024-01-08 DIAGNOSIS — R42 VERTIGO: ICD-10-CM

## 2024-01-08 DIAGNOSIS — N18.30 BENIGN HYPERTENSION WITH CKD (CHRONIC KIDNEY DISEASE) STAGE III (HCC): ICD-10-CM

## 2024-01-08 DIAGNOSIS — M85.80 OSTEOPENIA, UNSPECIFIED LOCATION: ICD-10-CM

## 2024-01-08 DIAGNOSIS — E55.9 VITAMIN D DEFICIENCY: ICD-10-CM

## 2024-01-08 DIAGNOSIS — E03.9 HYPOTHYROIDISM, UNSPECIFIED TYPE: ICD-10-CM

## 2024-01-08 DIAGNOSIS — Z00.00 MEDICARE ANNUAL WELLNESS VISIT, SUBSEQUENT: Primary | ICD-10-CM

## 2024-01-08 PROBLEM — M54.31 SCIATICA OF RIGHT SIDE: Status: RESOLVED | Noted: 2020-06-09 | Resolved: 2024-01-08

## 2024-01-08 PROCEDURE — 99214 OFFICE O/P EST MOD 30 MIN: CPT | Performed by: PHYSICIAN ASSISTANT

## 2024-01-08 PROCEDURE — G0439 PPPS, SUBSEQ VISIT: HCPCS | Performed by: PHYSICIAN ASSISTANT

## 2024-01-08 RX ORDER — MECLIZINE HYDROCHLORIDE 25 MG/1
25 TABLET ORAL EVERY 8 HOURS PRN
Qty: 30 TABLET | Refills: 0 | Status: SHIPPED | OUTPATIENT
Start: 2024-01-08

## 2024-01-08 NOTE — PROGRESS NOTES
Assessment and Plan:     Problem List Items Addressed This Visit          Endocrine    Hypothyroidism    Relevant Orders    TSH, 3rd generation with Free T4 reflex       Cardiovascular and Mediastinum    Benign hypertension with CKD (chronic kidney disease) stage III (HCC)    Relevant Orders    CBC and differential    Comprehensive metabolic panel       Musculoskeletal and Integument    Osteopenia       Other    Hyperlipidemia    Relevant Orders    Comprehensive metabolic panel    Lipid panel    Vitamin D deficiency     Other Visit Diagnoses       Medicare annual wellness visit, subsequent    -  Primary    Vertigo        Stable using as needed meclizine    Relevant Medications    meclizine (ANTIVERT) 25 mg tablet            Depression Screening and Follow-up Plan: Patient was screened for depression during today's encounter. They screened negative with a PHQ-2 score of 0.    Urinary Incontinence Plan of Care: counseling topics discussed: practice Kegel (pelvic floor strengthening) exercises and use restroom every 2 hours.       Preventive health issues were discussed with patient, and age appropriate screening tests were ordered as noted in patient's After Visit Summary.  Personalized health advice and appropriate referrals for health education or preventive services given if needed, as noted in patient's After Visit Summary.     History of Present Illness:     Patient presents for a Medicare Wellness Visit    Patient presents in the office for follow-up chronic conditions.  Patient has hypertension with chronic kidney disease stage III.  Pressure is currently well-controlled with lisinopril-HCTZ 10-12 0.5.  BUN and creatinine are normal.  GFR is stable at 55.  Hyperlipidemia she is on simvastatin 20 mg.  Lipid panel is at goal.  Hepatic functions are normal.  Patient is also on levothyroxine 88 mcg for hypothyroidism.  Current TSH and T4 are normal.  T3 is a little elevated.  Continue same dose of medication at  this time.  She has osteopenia she is on calcium and vitamin D supplementation.  Her DEXA scan is up-to-date       Patient Care Team:  Tracy Hogan PA-C as PCP - General (Family Medicine)  Tracy Hogan PA-C     Review of Systems:     Review of Systems   Constitutional:  Negative for activity change, appetite change, chills, fatigue, fever and unexpected weight change.   HENT:  Negative for ear pain, postnasal drip, rhinorrhea, sinus pressure, sinus pain and sore throat.    Eyes:  Negative for visual disturbance.   Respiratory:  Positive for cough. Negative for shortness of breath and wheezing.    Cardiovascular:  Negative for chest pain and leg swelling.   Gastrointestinal:  Negative for abdominal pain, blood in stool, constipation, diarrhea, nausea and vomiting.   Genitourinary:  Negative for difficulty urinating.        Stress  incontinence   Musculoskeletal:  Negative for arthralgias and myalgias.   Skin:  Negative for rash.   Neurological:  Negative for dizziness, syncope, light-headedness and headaches.   Psychiatric/Behavioral:  Negative for self-injury, sleep disturbance and suicidal ideas. The patient is not nervous/anxious.         Problem List:     Patient Active Problem List   Diagnosis    Benign hypertension with CKD (chronic kidney disease) stage III (HCC)    Hyperlipidemia    Hypothyroidism    Osteopenia    Vitamin D deficiency    Screening for breast cancer    Renal insufficiency    Stage 3 chronic kidney disease, unspecified whether stage 3a or 3b CKD (HCC)      Past Medical and Surgical History:     Past Medical History:   Diagnosis Date    Hyperlipidemia     Hypertension     PONV (postoperative nausea and vomiting)     had n/v post last coloscopy     Past Surgical History:   Procedure Laterality Date    COLONOSCOPY      TUBAL LIGATION        Family History:     Family History   Problem Relation Age of Onset    Diabetes Mother         DM    No Known Problems Father     Leukemia Sister 70     Lymphoma Sister 55    No Known Problems Daughter     No Known Problems Sister     No Known Problems Sister     No Known Problems Maternal Grandmother     No Known Problems Maternal Grandfather     No Known Problems Paternal Grandmother     No Known Problems Paternal Grandfather     No Known Problems Son       Social History:     Social History     Socioeconomic History    Marital status: /Civil Union     Spouse name: None    Number of children: None    Years of education: None    Highest education level: None   Occupational History    None   Tobacco Use    Smoking status: Never     Passive exposure: Past    Smokeless tobacco: Never   Vaping Use    Vaping status: Never Used   Substance and Sexual Activity    Alcohol use: Not Currently     Comment: rare    Drug use: No    Sexual activity: None   Other Topics Concern    None   Social History Narrative    None     Social Determinants of Health     Financial Resource Strain: Low Risk  (1/8/2024)    Overall Financial Resource Strain (CARDIA)     Difficulty of Paying Living Expenses: Not hard at all   Food Insecurity: Not on file   Transportation Needs: No Transportation Needs (1/8/2024)    PRAPARE - Transportation     Lack of Transportation (Medical): No     Lack of Transportation (Non-Medical): No   Physical Activity: Not on file   Stress: Not on file   Social Connections: Not on file   Intimate Partner Violence: Not on file   Housing Stability: Not on file      Medications and Allergies:     Current Outpatient Medications   Medication Sig Dispense Refill    Calcium Citrate-Vitamin D 315-250 MG-UNIT TABS Take by mouth      Cholecalciferol (VITAMIN D3) 1000 units CAPS Take 1 capsule by mouth daily      coenzyme Q-10 100 MG capsule Take by mouth      levothyroxine 88 mcg tablet TAKE 1 TABLET BY MOUTH EVERY DAY 90 tablet 1    lisinopril-hydrochlorothiazide (PRINZIDE,ZESTORETIC) 10-12.5 MG per tablet TAKE 1 TABLET BY MOUTH EVERY DAY 90 tablet 1    meclizine (ANTIVERT)  25 mg tablet Take 1 tablet (25 mg total) by mouth every 8 (eight) hours as needed for dizziness 30 tablet 0    Omega-3 Fatty Acids (FISH OIL) 645 MG CAPS Take by mouth      simvastatin (ZOCOR) 20 mg tablet TAKE 1 TABLET BY MOUTH EVERY DAY 90 tablet 1     No current facility-administered medications for this visit.     No Known Allergies   Immunizations:     Immunization History   Administered Date(s) Administered    COVID-19 MODERNA VACC 0.5 ML IM 02/26/2021, 03/26/2021, 04/28/2022    INFLUENZA 09/25/2013, 09/30/2015, 10/28/2016, 10/26/2017, 10/21/2019, 10/08/2021, 10/11/2022    Influenza Quadrivalent Preservative Free 3 years and older IM 09/30/2015, 10/28/2016    Influenza Split High Dose Preservative Free IM 10/26/2017, 10/21/2019    Influenza, high dose seasonal 0.7 mL 10/02/2018, 09/17/2020    Influenza, seasonal, injectable, preservative free 09/21/2012, 09/25/2013, 09/29/2014    Pneumococcal Conjugate 13-Valent 10/21/2019    Pneumococcal Polysaccharide PPV23 10/26/2017      Health Maintenance:         Topic Date Due    Hepatitis C Screening  Never done    Breast Cancer Screening: Mammogram  09/14/2024    DXA SCAN  12/08/2024    Colorectal Cancer Screening  06/25/2033         Topic Date Due    COVID-19 Vaccine (4 - 2023-24 season) 09/01/2023      Medicare Screening Tests and Risk Assessments:     Ania is here for her Subsequent Wellness visit.     Health Risk Assessment:   Patient rates overall health as good. Patient feels that their physical health rating is slightly better. Patient is very satisfied with their life. Eyesight was rated as same. Hearing was rated as same. Patient feels that their emotional and mental health rating is same. Patients states they are never, rarely angry. Patient states they are never, rarely unusually tired/fatigued. Pain experienced in the last 7 days has been none. Patient states that she has experienced no weight loss or gain in last 6 months.     Depression Screening:    PHQ-2 Score: 0      Fall Risk Screening:   In the past year, patient has experienced: no history of falling in past year      Urinary Incontinence Screening:   Patient has leaked urine accidently in the last six months.     Home Safety:  Patient does not have trouble with stairs inside or outside of their home. Patient has working smoke alarms and has working carbon monoxide detector. Home safety hazards include: none.     Nutrition:   Current diet is Regular.     Medications:   Patient is currently taking over-the-counter supplements. OTC medications include: see medication list. Patient is able to manage medications.     Activities of Daily Living (ADLs)/Instrumental Activities of Daily Living (IADLs):   Walk and transfer into and out of bed and chair?: Yes  Dress and groom yourself?: Yes    Bathe or shower yourself?: Yes    Feed yourself? Yes  Do your laundry/housekeeping?: Yes  Manage your money, pay your bills and track your expenses?: Yes  Make your own meals?: Yes    Do your own shopping?: Yes    Previous Hospitalizations:   Any hospitalizations or ED visits within the last 12 months?: No      Advance Care Planning:   Living will: No      Cognitive Screening:   Provider or family/friend/caregiver concerned regarding cognition?: No    PREVENTIVE SCREENINGS      Cardiovascular Screening:    General: History Lipid Disorder and Screening Current      Diabetes Screening:     General: Screening Current      Colorectal Cancer Screening:     General: Screening Current      Breast Cancer Screening:     General: Screening Current      Cervical Cancer Screening:    General: Screening Not Indicated      Osteoporosis Screening:    General: Screening Current      Lung Cancer Screening:     General: Screening Not Indicated    Screening, Brief Intervention, and Referral to Treatment (SBIRT)    Screening  Typical number of drinks in a day: 0  Typical number of drinks in a week: 0  Interpretation: Low risk drinking  "behavior.    Single Item Drug Screening:  How often have you used an illegal drug (including marijuana) or a prescription medication for non-medical reasons in the past year? never    Single Item Drug Screen Score: 0  Interpretation: Negative screen for possible drug use disorder    Brief Intervention  Alcohol & drug use screenings were reviewed. No concerns regarding substance use disorder identified.     No results found.     Physical Exam:     /82 (BP Location: Left arm, Patient Position: Sitting, Cuff Size: Large)   Pulse 63   Temp 97.8 °F (36.6 °C) (Temporal)   Ht 5' 5.25\" (1.657 m)   Wt 82.2 kg (181 lb 3.2 oz)   SpO2 95%   BMI 29.92 kg/m²     Physical Exam  Vitals and nursing note reviewed.   Constitutional:       General: She is not in acute distress.     Appearance: Normal appearance. She is well-developed. She is not diaphoretic.   HENT:      Head: Normocephalic and atraumatic.      Right Ear: Tympanic membrane, ear canal and external ear normal.      Left Ear: Tympanic membrane, ear canal and external ear normal.      Mouth/Throat:      Pharynx: No posterior oropharyngeal erythema.   Eyes:      Conjunctiva/sclera: Conjunctivae normal.      Pupils: Pupils are equal, round, and reactive to light.   Neck:      Thyroid: No thyromegaly.      Vascular: No carotid bruit.   Cardiovascular:      Rate and Rhythm: Normal rate and regular rhythm.      Heart sounds: Normal heart sounds. No murmur heard.     No friction rub.   Pulmonary:      Effort: Pulmonary effort is normal. No respiratory distress.      Breath sounds: Normal breath sounds. No wheezing.   Abdominal:      General: Bowel sounds are normal. There is no distension.      Palpations: Abdomen is soft. There is no mass.      Tenderness: There is no abdominal tenderness.   Musculoskeletal:      Right lower leg: No edema.      Left lower leg: No edema.   Lymphadenopathy:      Cervical: No cervical adenopathy.   Skin:     General: Skin is warm and " dry.   Neurological:      General: No focal deficit present.      Mental Status: She is alert and oriented to person, place, and time.   Psychiatric:         Mood and Affect: Mood normal.         Behavior: Behavior normal.         Thought Content: Thought content normal.         Judgment: Judgment normal.          Tracy Hogan PA-C

## 2024-01-08 NOTE — PATIENT INSTRUCTIONS
Medicare Preventive Visit Patient Instructions  Thank you for completing your Welcome to Medicare Visit or Medicare Annual Wellness Visit today. Your next wellness visit will be due in one year (1/8/2025).  The screening/preventive services that you may require over the next 5-10 years are detailed below. Some tests may not apply to you based off risk factors and/or age. Screening tests ordered at today's visit but not completed yet may show as past due. Also, please note that scanned in results may not display below.  Preventive Screenings:  Service Recommendations Previous Testing/Comments   Colorectal Cancer Screening  * Colonoscopy    * Fecal Occult Blood Test (FOBT)/Fecal Immunochemical Test (FIT)  * Fecal DNA/Cologuard Test  * Flexible Sigmoidoscopy Age: 45-75 years old   Colonoscopy: every 10 years (may be performed more frequently if at higher risk)  OR  FOBT/FIT: every 1 year  OR  Cologuard: every 3 years  OR  Sigmoidoscopy: every 5 years  Screening may be recommended earlier than age 45 if at higher risk for colorectal cancer. Also, an individualized decision between you and your healthcare provider will decide whether screening between the ages of 76-85 would be appropriate. Colonoscopy: 06/28/2023  FOBT/FIT: Not on file  Cologuard: Not on file  Sigmoidoscopy: Not on file    Screening Current     Breast Cancer Screening Age: 40+ years old  Frequency: every 1-2 years  Not required if history of left and right mastectomy Mammogram: 09/14/2023    Screening Current   Cervical Cancer Screening Between the ages of 21-29, pap smear recommended once every 3 years.   Between the ages of 30-65, can perform pap smear with HPV co-testing every 5 years.   Recommendations may differ for women with a history of total hysterectomy, cervical cancer, or abnormal pap smears in past. Pap Smear: Not on file    Screening Not Indicated   Hepatitis C Screening Once for adults born between 1945 and 1965  More frequently in  patients at high risk for Hepatitis C Hep C Antibody: Not on file        Diabetes Screening 1-2 times per year if you're at risk for diabetes or have pre-diabetes Fasting glucose: 94 mg/dL (12/28/2023)  A1C: 6.0 % (12/10/2020)  Screening Current   Cholesterol Screening Once every 5 years if you don't have a lipid disorder. May order more often based on risk factors. Lipid panel: 12/28/2023    Screening Not Indicated  History Lipid Disorder     Other Preventive Screenings Covered by Medicare:  Abdominal Aortic Aneurysm (AAA) Screening: covered once if your at risk. You're considered to be at risk if you have a family history of AAA.  Lung Cancer Screening: covers low dose CT scan once per year if you meet all of the following conditions: (1) Age 55-77; (2) No signs or symptoms of lung cancer; (3) Current smoker or have quit smoking within the last 15 years; (4) You have a tobacco smoking history of at least 20 pack years (packs per day multiplied by number of years you smoked); (5) You get a written order from a healthcare provider.  Glaucoma Screening: covered annually if you're considered high risk: (1) You have diabetes OR (2) Family history of glaucoma OR (3)  aged 50 and older OR (4)  American aged 65 and older  Osteoporosis Screening: covered every 2 years if you meet one of the following conditions: (1) You're estrogen deficient and at risk for osteoporosis based off medical history and other findings; (2) Have a vertebral abnormality; (3) On glucocorticoid therapy for more than 3 months; (4) Have primary hyperparathyroidism; (5) On osteoporosis medications and need to assess response to drug therapy.   Last bone density test (DXA Scan): 12/08/2022.  HIV Screening: covered annually if you're between the age of 15-65. Also covered annually if you are younger than 15 and older than 65 with risk factors for HIV infection. For pregnant patients, it is covered up to 3 times per  pregnancy.    Immunizations:  Immunization Recommendations   Influenza Vaccine Annual influenza vaccination during flu season is recommended for all persons aged >= 6 months who do not have contraindications   Pneumococcal Vaccine   * Pneumococcal conjugate vaccine = PCV13 (Prevnar 13), PCV15 (Vaxneuvance), PCV20 (Prevnar 20)  * Pneumococcal polysaccharide vaccine = PPSV23 (Pneumovax) Adults 19-63 yo with certain risk factors or if 65+ yo  If never received any pneumonia vaccine: recommend Prevnar 20 (PCV20)  Give PCV20 if previously received 1 dose of PCV13 or PPSV23   Hepatitis B Vaccine 3 dose series if at intermediate or high risk (ex: diabetes, end stage renal disease, liver disease)   Respiratory syncytial virus (RSV) Vaccine - COVERED BY MEDICARE PART D  * RSVPreF3 (Arexvy) CDC recommends that adults 60 years of age and older may receive a single dose of RSV vaccine using shared clinical decision-making (SCDM)   Tetanus (Td) Vaccine - COST NOT COVERED BY MEDICARE PART B Following completion of primary series, a booster dose should be given every 10 years to maintain immunity against tetanus. Td may also be given as tetanus wound prophylaxis.   Tdap Vaccine - COST NOT COVERED BY MEDICARE PART B Recommended at least once for all adults. For pregnant patients, recommended with each pregnancy.   Shingles Vaccine (Shingrix) - COST NOT COVERED BY MEDICARE PART B  2 shot series recommended in those 19 years and older who have or will have weakened immune systems or those 50 years and older     Health Maintenance Due:      Topic Date Due   • Hepatitis C Screening  Never done   • Breast Cancer Screening: Mammogram  09/14/2024   • DXA SCAN  12/08/2024   • Colorectal Cancer Screening  06/25/2033     Immunizations Due:      Topic Date Due   • COVID-19 Vaccine (4 - 2023-24 season) 09/01/2023     Advance Directives   What are advance directives?  Advance directives are legal documents that state your wishes and plans for  medical care. These plans are made ahead of time in case you lose your ability to make decisions for yourself. Advance directives can apply to any medical decision, such as the treatments you want, and if you want to donate organs.   What are the types of advance directives?  There are many types of advance directives, and each state has rules about how to use them. You may choose a combination of any of the following:  Living will:  This is a written record of the treatment you want. You can also choose which treatments you do not want, which to limit, and which to stop at a certain time. This includes surgery, medicine, IV fluid, and tube feedings.   Durable power of  for healthcare (DPAHC):  This is a written record that states who you want to make healthcare choices for you when you are unable to make them for yourself. This person, called a proxy, is usually a family member or a friend. You may choose more than 1 proxy.  Do not resuscitate (DNR) order:  A DNR order is used in case your heart stops beating or you stop breathing. It is a request not to have certain forms of treatment, such as CPR. A DNR order may be included in other types of advance directives.  Medical directive:  This covers the care that you want if you are in a coma, near death, or unable to make decisions for yourself. You can list the treatments you want for each condition. Treatment may include pain medicine, surgery, blood transfusions, dialysis, IV or tube feedings, and a ventilator (breathing machine).  Values history:  This document has questions about your views, beliefs, and how you feel and think about life. This information can help others choose the care that you would choose.  Why are advance directives important?  An advance directive helps you control your care. Although spoken wishes may be used, it is better to have your wishes written down. Spoken wishes can be misunderstood, or not followed. Treatments may be given  even if you do not want them. An advance directive may make it easier for your family to make difficult choices about your care.   Urinary Incontinence   Urinary incontinence (UI)  is when you lose control of your bladder. UI develops because your bladder cannot store or empty urine properly. The 3 most common types of UI are stress incontinence, urge incontinence, or both.  Medicines:   May be given to help strengthen your bladder control. Report any side effects of medication to your healthcare provider.  Do pelvic muscle exercises often:  Your pelvic muscles help you stop urinating. Squeeze these muscles tight for 5 seconds, then relax for 5 seconds. Gradually work up to squeezing for 10 seconds. Do 3 sets of 15 repetitions a day, or as directed. This will help strengthen your pelvic muscles and improve bladder control.  Train your bladder:  Go to the bathroom at set times, such as every 2 hours, even if you do not feel the urge to go. You can also try to hold your urine when you feel the urge to go. For example, hold your urine for 5 minutes when you feel the urge to go. As that becomes easier, hold your urine for 10 minutes.   Self-care:   Keep a UI record.  Write down how often you leak urine and how much you leak. Make a note of what you were doing when you leaked urine.  Drink liquids as directed. You may need to limit the amount of liquid you drink to help control your urine leakage. Do not drink any liquid right before you go to bed. Limit or do not have drinks that contain caffeine or alcohol.   Prevent constipation.  Eat a variety of high-fiber foods. Good examples are high-fiber cereals, beans, vegetables, and whole-grain breads. Walking is the best way to trigger your intestines to have a bowel movement.  Exercise regularly and maintain a healthy weight.  Weight loss and exercise will decrease pressure on your bladder and help you control your leakage.   Use a catheter as directed  to help empty your  bladder. A catheter is a tiny, plastic tube that is put into your bladder to drain your urine.   Go to behavior therapy as directed.  Behavior therapy may be used to help you learn to control your urge to urinate.    Weight Management   Why it is important to manage your weight:  Being overweight increases your risk of health conditions such as heart disease, high blood pressure, type 2 diabetes, and certain types of cancer. It can also increase your risk for osteoarthritis, sleep apnea, and other respiratory problems. Aim for a slow, steady weight loss. Even a small amount of weight loss can lower your risk of health problems.  How to lose weight safely:  A safe and healthy way to lose weight is to eat fewer calories and get regular exercise. You can lose up about 1 pound a week by decreasing the number of calories you eat by 500 calories each day.   Healthy meal plan for weight management:  A healthy meal plan includes a variety of foods, contains fewer calories, and helps you stay healthy. A healthy meal plan includes the following:  Eat whole-grain foods more often.  A healthy meal plan should contain fiber. Fiber is the part of grains, fruits, and vegetables that is not broken down by your body. Whole-grain foods are healthy and provide extra fiber in your diet. Some examples of whole-grain foods are whole-wheat breads and pastas, oatmeal, brown rice, and bulgur.  Eat a variety of vegetables every day.  Include dark, leafy greens such as spinach, kale, omar greens, and mustard greens. Eat yellow and orange vegetables such as carrots, sweet potatoes, and winter squash.   Eat a variety of fruits every day.  Choose fresh or canned fruit (canned in its own juice or light syrup) instead of juice. Fruit juice has very little or no fiber.  Eat low-fat dairy foods.  Drink fat-free (skim) milk or 1% milk. Eat fat-free yogurt and low-fat cottage cheese. Try low-fat cheeses such as mozzarella and other reduced-fat  cheeses.  Choose meat and other protein foods that are low in fat.  Choose beans or other legumes such as split peas or lentils. Choose fish, skinless poultry (chicken or turkey), or lean cuts of red meat (beef or pork). Before you cook meat or poultry, cut off any visible fat.   Use less fat and oil.  Try baking foods instead of frying them. Add less fat, such as margarine, sour cream, regular salad dressing and mayonnaise to foods. Eat fewer high-fat foods. Some examples of high-fat foods include french fries, doughnuts, ice cream, and cakes.  Eat fewer sweets.  Limit foods and drinks that are high in sugar. This includes candy, cookies, regular soda, and sweetened drinks.  Exercise:  Exercise at least 30 minutes per day on most days of the week. Some examples of exercise include walking, biking, dancing, and swimming. You can also fit in more physical activity by taking the stairs instead of the elevator or parking farther away from stores. Ask your healthcare provider about the best exercise plan for you.      © Copyright [employer] 2018 Information is for End User's use only and may not be sold, redistributed or otherwise used for commercial purposes. All illustrations and images included in CareNotes® are the copyrighted property of [employer] or [employer]

## 2024-02-21 PROBLEM — Z12.39 SCREENING FOR BREAST CANCER: Status: RESOLVED | Noted: 2018-09-28 | Resolved: 2024-02-21

## 2024-03-10 DIAGNOSIS — E78.5 HYPERLIPIDEMIA, UNSPECIFIED HYPERLIPIDEMIA TYPE: ICD-10-CM

## 2024-03-10 RX ORDER — SIMVASTATIN 20 MG
TABLET ORAL
Qty: 90 TABLET | Refills: 1 | Status: SHIPPED | OUTPATIENT
Start: 2024-03-10

## 2024-03-20 DIAGNOSIS — I10 ESSENTIAL HYPERTENSION: ICD-10-CM

## 2024-03-20 RX ORDER — LISINOPRIL AND HYDROCHLOROTHIAZIDE 12.5; 1 MG/1; MG/1
TABLET ORAL
Qty: 90 TABLET | Refills: 1 | Status: SHIPPED | OUTPATIENT
Start: 2024-03-20

## 2024-05-15 DIAGNOSIS — E03.9 HYPOTHYROIDISM, UNSPECIFIED TYPE: ICD-10-CM

## 2024-05-15 RX ORDER — LEVOTHYROXINE SODIUM 88 UG/1
TABLET ORAL
Qty: 90 TABLET | Refills: 1 | Status: SHIPPED | OUTPATIENT
Start: 2024-05-15

## 2024-06-26 ENCOUNTER — RA CDI HCC (OUTPATIENT)
Dept: OTHER | Facility: HOSPITAL | Age: 74
End: 2024-06-26

## 2024-06-28 ENCOUNTER — APPOINTMENT (OUTPATIENT)
Dept: LAB | Facility: CLINIC | Age: 74
End: 2024-06-28
Payer: MEDICARE

## 2024-06-28 DIAGNOSIS — E03.9 HYPOTHYROIDISM, UNSPECIFIED TYPE: ICD-10-CM

## 2024-06-28 DIAGNOSIS — E78.2 MIXED HYPERLIPIDEMIA: ICD-10-CM

## 2024-06-28 DIAGNOSIS — I12.9 BENIGN HYPERTENSION WITH CKD (CHRONIC KIDNEY DISEASE) STAGE III (HCC): ICD-10-CM

## 2024-06-28 DIAGNOSIS — N18.30 BENIGN HYPERTENSION WITH CKD (CHRONIC KIDNEY DISEASE) STAGE III (HCC): ICD-10-CM

## 2024-06-28 LAB
ALBUMIN SERPL BCG-MCNC: 4.1 G/DL (ref 3.5–5)
ALP SERPL-CCNC: 53 U/L (ref 34–104)
ALT SERPL W P-5'-P-CCNC: 10 U/L (ref 7–52)
ANION GAP SERPL CALCULATED.3IONS-SCNC: 10 MMOL/L (ref 4–13)
AST SERPL W P-5'-P-CCNC: 13 U/L (ref 13–39)
BASOPHILS # BLD AUTO: 0.03 THOUSANDS/ÂΜL (ref 0–0.1)
BASOPHILS NFR BLD AUTO: 0 % (ref 0–1)
BILIRUB SERPL-MCNC: 0.58 MG/DL (ref 0.2–1)
BUN SERPL-MCNC: 17 MG/DL (ref 5–25)
CALCIUM SERPL-MCNC: 9.4 MG/DL (ref 8.4–10.2)
CHLORIDE SERPL-SCNC: 101 MMOL/L (ref 96–108)
CHOLEST SERPL-MCNC: 160 MG/DL
CO2 SERPL-SCNC: 28 MMOL/L (ref 21–32)
CREAT SERPL-MCNC: 0.99 MG/DL (ref 0.6–1.3)
EOSINOPHIL # BLD AUTO: 0.16 THOUSAND/ÂΜL (ref 0–0.61)
EOSINOPHIL NFR BLD AUTO: 2 % (ref 0–6)
ERYTHROCYTE [DISTWIDTH] IN BLOOD BY AUTOMATED COUNT: 14.2 % (ref 11.6–15.1)
GFR SERPL CREATININE-BSD FRML MDRD: 57 ML/MIN/1.73SQ M
GLUCOSE P FAST SERPL-MCNC: 97 MG/DL (ref 65–99)
HCT VFR BLD AUTO: 44.1 % (ref 34.8–46.1)
HDLC SERPL-MCNC: 44 MG/DL
HGB BLD-MCNC: 14.4 G/DL (ref 11.5–15.4)
IMM GRANULOCYTES # BLD AUTO: 0.01 THOUSAND/UL (ref 0–0.2)
IMM GRANULOCYTES NFR BLD AUTO: 0 % (ref 0–2)
LDLC SERPL CALC-MCNC: 74 MG/DL (ref 0–100)
LYMPHOCYTES # BLD AUTO: 2.23 THOUSANDS/ÂΜL (ref 0.6–4.47)
LYMPHOCYTES NFR BLD AUTO: 33 % (ref 14–44)
MCH RBC QN AUTO: 29.4 PG (ref 26.8–34.3)
MCHC RBC AUTO-ENTMCNC: 32.7 G/DL (ref 31.4–37.4)
MCV RBC AUTO: 90 FL (ref 82–98)
MONOCYTES # BLD AUTO: 0.72 THOUSAND/ÂΜL (ref 0.17–1.22)
MONOCYTES NFR BLD AUTO: 11 % (ref 4–12)
NEUTROPHILS # BLD AUTO: 3.69 THOUSANDS/ÂΜL (ref 1.85–7.62)
NEUTS SEG NFR BLD AUTO: 54 % (ref 43–75)
NONHDLC SERPL-MCNC: 116 MG/DL
NRBC BLD AUTO-RTO: 0 /100 WBCS
PLATELET # BLD AUTO: 258 THOUSANDS/UL (ref 149–390)
PMV BLD AUTO: 9.8 FL (ref 8.9–12.7)
POTASSIUM SERPL-SCNC: 4.9 MMOL/L (ref 3.5–5.3)
PROT SERPL-MCNC: 6.9 G/DL (ref 6.4–8.4)
RBC # BLD AUTO: 4.9 MILLION/UL (ref 3.81–5.12)
SODIUM SERPL-SCNC: 139 MMOL/L (ref 135–147)
TRIGL SERPL-MCNC: 209 MG/DL
TSH SERPL DL<=0.05 MIU/L-ACNC: 1.93 UIU/ML (ref 0.45–4.5)
WBC # BLD AUTO: 6.84 THOUSAND/UL (ref 4.31–10.16)

## 2024-06-28 PROCEDURE — 36415 COLL VENOUS BLD VENIPUNCTURE: CPT

## 2024-06-28 PROCEDURE — 80053 COMPREHEN METABOLIC PANEL: CPT

## 2024-06-28 PROCEDURE — 85025 COMPLETE CBC W/AUTO DIFF WBC: CPT

## 2024-06-28 PROCEDURE — 80061 LIPID PANEL: CPT

## 2024-06-28 PROCEDURE — 84443 ASSAY THYROID STIM HORMONE: CPT

## 2024-07-08 ENCOUNTER — OFFICE VISIT (OUTPATIENT)
Dept: FAMILY MEDICINE CLINIC | Facility: CLINIC | Age: 74
End: 2024-07-08
Payer: MEDICARE

## 2024-07-08 VITALS
SYSTOLIC BLOOD PRESSURE: 130 MMHG | TEMPERATURE: 97.7 F | DIASTOLIC BLOOD PRESSURE: 78 MMHG | WEIGHT: 183.2 LBS | BODY MASS INDEX: 30.52 KG/M2 | OXYGEN SATURATION: 95 % | HEIGHT: 65 IN | HEART RATE: 95 BPM

## 2024-07-08 DIAGNOSIS — E78.2 MIXED HYPERLIPIDEMIA: ICD-10-CM

## 2024-07-08 DIAGNOSIS — I12.9 BENIGN HYPERTENSION WITH CKD (CHRONIC KIDNEY DISEASE) STAGE III (HCC): Primary | ICD-10-CM

## 2024-07-08 DIAGNOSIS — N18.30 BENIGN HYPERTENSION WITH CKD (CHRONIC KIDNEY DISEASE) STAGE III (HCC): Primary | ICD-10-CM

## 2024-07-08 DIAGNOSIS — E55.9 VITAMIN D DEFICIENCY: ICD-10-CM

## 2024-07-08 DIAGNOSIS — E03.9 HYPOTHYROIDISM, UNSPECIFIED TYPE: ICD-10-CM

## 2024-07-08 DIAGNOSIS — Z78.0 POSTMENOPAUSAL: ICD-10-CM

## 2024-07-08 DIAGNOSIS — Z12.31 SCREENING MAMMOGRAM FOR BREAST CANCER: ICD-10-CM

## 2024-07-08 DIAGNOSIS — M85.80 OSTEOPENIA, UNSPECIFIED LOCATION: ICD-10-CM

## 2024-07-08 PROCEDURE — G2211 COMPLEX E/M VISIT ADD ON: HCPCS | Performed by: PHYSICIAN ASSISTANT

## 2024-07-08 PROCEDURE — 99214 OFFICE O/P EST MOD 30 MIN: CPT | Performed by: PHYSICIAN ASSISTANT

## 2024-07-08 NOTE — PROGRESS NOTES
=Assessment/Plan:     Diagnoses and all orders for this visit:    Benign hypertension with CKD (chronic kidney disease) stage III (HCC)  Comments:  Blood pressure is at goal continue current regimen  Orders:  -     CBC and differential  -     Comprehensive metabolic panel    Hypothyroidism, unspecified type  Comments:  Sh in normal range continue levothyroxine 88 mcg  Orders:  -     TSH, 3rd generation  -     T4  -     T3    Mixed hyperlipidemia  Comments:  Beds are acceptable continue on therapy and low-fat diet  Orders:  -     Lipid panel    Osteopenia, unspecified location  Comments:  Continue calcium vitamin D supplements along with weightbearing exercise    Vitamin D deficiency    Screening mammogram for breast cancer  -     Mammo screening bilateral w 3d & cad; Future    Postmenopausal  -     DXA bone density spine hip and pelvis; Future          Subjective:      Patient ID: Ania Aaron is a 73 y.o. female.    Patient presents in the office for follow-up chronic conditions.  Patient has hypertension with chronic kidney disease stage III.  Her blood pressure is well-controlled with lisinopril-HCTZ 10-12 0.5.  BUN and creatinine are normal.  Her GFR is 57.  She is aware to avoid over-the-counter NSAIDs.  For hyperlipidemia patient is on simvastatin 20 mg.  Panel is good.  Her triglycerides were mildly elevated at 209.  Jose low-carb low sugar diet.  For hypothyroidism patient is on levothyroxine 88 mcg.  TSH in normal range.  She has osteopenia and vitamin D deficiency.  She is on over-the-counter supplementation.  Mammogram is due in September.  DEXA scan due in December        The following portions of the patient's history were reviewed and updated as appropriate: She   Patient Active Problem List    Diagnosis Date Noted    Stage 3 chronic kidney disease, unspecified whether stage 3a or 3b CKD (HCC) 01/28/2020    Vitamin D deficiency 09/25/2013    Benign hypertension with CKD (chronic kidney disease) stage  III (HCC) 06/18/2012    Hyperlipidemia 06/18/2012    Hypothyroidism 06/18/2012    Osteopenia 06/18/2012     Current Outpatient Medications   Medication Sig Dispense Refill    Calcium Citrate-Vitamin D 315-250 MG-UNIT TABS Take by mouth      Cholecalciferol (VITAMIN D3) 1000 units CAPS Take 1 capsule by mouth daily      coenzyme Q-10 100 MG capsule Take by mouth      levothyroxine 88 mcg tablet TAKE 1 TABLET BY MOUTH EVERY DAY 90 tablet 1    lisinopril-hydrochlorothiazide (PRINZIDE,ZESTORETIC) 10-12.5 MG per tablet TAKE 1 TABLET BY MOUTH EVERY DAY 90 tablet 1    meclizine (ANTIVERT) 25 mg tablet Take 1 tablet (25 mg total) by mouth every 8 (eight) hours as needed for dizziness 30 tablet 0    Omega-3 Fatty Acids (FISH OIL) 645 MG CAPS Take by mouth      simvastatin (ZOCOR) 20 mg tablet TAKE 1 TABLET BY MOUTH EVERY DAY 90 tablet 1     No current facility-administered medications for this visit.     She has No Known Allergies..    Review of Systems   Constitutional:  Negative for activity change and unexpected weight change.   HENT:  Negative for ear pain and sore throat.    Eyes:  Negative for visual disturbance.   Respiratory:  Negative for cough, shortness of breath and wheezing.    Cardiovascular:  Negative for chest pain and leg swelling.   Gastrointestinal:  Negative for abdominal pain, blood in stool, constipation, diarrhea, nausea and vomiting.   Genitourinary:  Negative for difficulty urinating.   Musculoskeletal:  Negative for arthralgias and myalgias.   Skin:  Negative for rash.   Neurological:  Negative for dizziness, syncope, light-headedness and headaches.   Psychiatric/Behavioral:  Negative for self-injury, sleep disturbance and suicidal ideas. The patient is not nervous/anxious.          Objective:        Physical Exam  Vitals and nursing note reviewed.   Constitutional:       General: She is not in acute distress.     Appearance: She is well-developed. She is not ill-appearing or diaphoretic.   HENT:       Head: Normocephalic and atraumatic.      Right Ear: Tympanic membrane, ear canal and external ear normal.      Left Ear: Tympanic membrane, ear canal and external ear normal.      Mouth/Throat:      Pharynx: No posterior oropharyngeal erythema.   Eyes:      Conjunctiva/sclera: Conjunctivae normal.      Pupils: Pupils are equal, round, and reactive to light.   Neck:      Thyroid: No thyromegaly.      Vascular: No carotid bruit.   Cardiovascular:      Rate and Rhythm: Normal rate and regular rhythm.      Heart sounds: Normal heart sounds. No murmur heard.     No friction rub. No gallop.   Pulmonary:      Effort: Pulmonary effort is normal. No respiratory distress.      Breath sounds: Normal breath sounds. No wheezing.   Abdominal:      General: Bowel sounds are normal. There is no distension.      Palpations: Abdomen is soft. There is no mass.      Tenderness: There is no abdominal tenderness.   Musculoskeletal:      Right lower leg: No edema.      Left lower leg: No edema.   Lymphadenopathy:      Cervical: No cervical adenopathy.   Skin:     General: Skin is warm and dry.      Findings: No erythema or rash.   Neurological:      General: No focal deficit present.      Mental Status: She is alert and oriented to person, place, and time.   Psychiatric:         Mood and Affect: Mood normal.         Behavior: Behavior normal.         Thought Content: Thought content normal.         Judgment: Judgment normal.

## 2024-09-09 DIAGNOSIS — E78.5 HYPERLIPIDEMIA, UNSPECIFIED HYPERLIPIDEMIA TYPE: ICD-10-CM

## 2024-09-09 DIAGNOSIS — I10 ESSENTIAL HYPERTENSION: ICD-10-CM

## 2024-09-09 RX ORDER — SIMVASTATIN 20 MG
TABLET ORAL
Qty: 90 TABLET | Refills: 1 | Status: SHIPPED | OUTPATIENT
Start: 2024-09-09

## 2024-09-09 RX ORDER — LISINOPRIL/HYDROCHLOROTHIAZIDE 10-12.5 MG
TABLET ORAL
Qty: 90 TABLET | Refills: 1 | Status: SHIPPED | OUTPATIENT
Start: 2024-09-09

## 2024-10-29 ENCOUNTER — HOSPITAL ENCOUNTER (OUTPATIENT)
Dept: RADIOLOGY | Facility: MEDICAL CENTER | Age: 74
Discharge: HOME | End: 2024-10-29
Payer: MEDICARE

## 2024-10-29 VITALS — HEIGHT: 65 IN | WEIGHT: 183 LBS | BODY MASS INDEX: 30.49 KG/M2

## 2024-10-29 DIAGNOSIS — Z12.31 ENCOUNTER FOR SCREENING MAMMOGRAM FOR MALIGNANT NEOPLASM OF BREAST: ICD-10-CM

## 2024-10-29 DIAGNOSIS — Z12.31 SCREENING MAMMOGRAM FOR BREAST CANCER: ICD-10-CM

## 2024-10-29 PROCEDURE — 77063 BREAST TOMOSYNTHESIS BI: CPT

## 2024-10-29 PROCEDURE — 77067 SCR MAMMO BI INCL CAD: CPT

## 2024-11-04 DIAGNOSIS — E03.9 HYPOTHYROIDISM, UNSPECIFIED TYPE: ICD-10-CM

## 2024-11-05 RX ORDER — LEVOTHYROXINE SODIUM 88 UG/1
TABLET ORAL
Qty: 90 TABLET | Refills: 1 | Status: SHIPPED | OUTPATIENT
Start: 2024-11-05

## 2024-12-27 ENCOUNTER — HOSPITAL ENCOUNTER (OUTPATIENT)
Dept: RADIOLOGY | Age: 74
Discharge: HOME | End: 2024-12-27
Payer: MEDICARE

## 2024-12-27 VITALS — HEIGHT: 66 IN | WEIGHT: 182 LBS | BODY MASS INDEX: 29.25 KG/M2

## 2024-12-27 DIAGNOSIS — Z78.0 POSTMENOPAUSAL: ICD-10-CM

## 2024-12-27 PROCEDURE — 77080 DXA BONE DENSITY AXIAL: CPT

## 2024-12-30 ENCOUNTER — APPOINTMENT (OUTPATIENT)
Dept: LAB | Facility: CLINIC | Age: 74
End: 2024-12-30
Payer: MEDICARE

## 2024-12-30 LAB
ALBUMIN SERPL BCG-MCNC: 4.1 G/DL (ref 3.5–5)
ALP SERPL-CCNC: 52 U/L (ref 34–104)
ALT SERPL W P-5'-P-CCNC: 14 U/L (ref 7–52)
ANION GAP SERPL CALCULATED.3IONS-SCNC: 8 MMOL/L (ref 4–13)
AST SERPL W P-5'-P-CCNC: 15 U/L (ref 13–39)
BASOPHILS # BLD AUTO: 0.06 THOUSANDS/ΜL (ref 0–0.1)
BASOPHILS NFR BLD AUTO: 1 % (ref 0–1)
BILIRUB SERPL-MCNC: 0.62 MG/DL (ref 0.2–1)
BUN SERPL-MCNC: 19 MG/DL (ref 5–25)
CALCIUM SERPL-MCNC: 9.4 MG/DL (ref 8.4–10.2)
CHLORIDE SERPL-SCNC: 102 MMOL/L (ref 96–108)
CHOLEST SERPL-MCNC: 153 MG/DL (ref ?–200)
CO2 SERPL-SCNC: 29 MMOL/L (ref 21–32)
CREAT SERPL-MCNC: 1 MG/DL (ref 0.6–1.3)
EOSINOPHIL # BLD AUTO: 0.16 THOUSAND/ΜL (ref 0–0.61)
EOSINOPHIL NFR BLD AUTO: 2 % (ref 0–6)
ERYTHROCYTE [DISTWIDTH] IN BLOOD BY AUTOMATED COUNT: 14.1 % (ref 11.6–15.1)
GFR SERPL CREATININE-BSD FRML MDRD: 56 ML/MIN/1.73SQ M
GLUCOSE P FAST SERPL-MCNC: 92 MG/DL (ref 65–99)
HCT VFR BLD AUTO: 42.8 % (ref 34.8–46.1)
HDLC SERPL-MCNC: 47 MG/DL
HGB BLD-MCNC: 13.8 G/DL (ref 11.5–15.4)
IMM GRANULOCYTES # BLD AUTO: 0.03 THOUSAND/UL (ref 0–0.2)
IMM GRANULOCYTES NFR BLD AUTO: 0 % (ref 0–2)
LDLC SERPL CALC-MCNC: 72 MG/DL (ref 0–100)
LYMPHOCYTES # BLD AUTO: 1.86 THOUSANDS/ΜL (ref 0.6–4.47)
LYMPHOCYTES NFR BLD AUTO: 24 % (ref 14–44)
MCH RBC QN AUTO: 28.9 PG (ref 26.8–34.3)
MCHC RBC AUTO-ENTMCNC: 32.2 G/DL (ref 31.4–37.4)
MCV RBC AUTO: 90 FL (ref 82–98)
MONOCYTES # BLD AUTO: 0.86 THOUSAND/ΜL (ref 0.17–1.22)
MONOCYTES NFR BLD AUTO: 11 % (ref 4–12)
NEUTROPHILS # BLD AUTO: 4.89 THOUSANDS/ΜL (ref 1.85–7.62)
NEUTS SEG NFR BLD AUTO: 62 % (ref 43–75)
NONHDLC SERPL-MCNC: 106 MG/DL
NRBC BLD AUTO-RTO: 0 /100 WBCS
PLATELET # BLD AUTO: 306 THOUSANDS/UL (ref 149–390)
PMV BLD AUTO: 10.2 FL (ref 8.9–12.7)
POTASSIUM SERPL-SCNC: 4.6 MMOL/L (ref 3.5–5.3)
PROT SERPL-MCNC: 7.3 G/DL (ref 6.4–8.4)
RBC # BLD AUTO: 4.77 MILLION/UL (ref 3.81–5.12)
SODIUM SERPL-SCNC: 139 MMOL/L (ref 135–147)
T3 SERPL-MCNC: 2 NG/ML (ref 0.9–1.8)
T4 SERPL-MCNC: 8.54 UG/DL (ref 6.09–12.23)
TRIGL SERPL-MCNC: 171 MG/DL (ref ?–150)
TSH SERPL DL<=0.05 MIU/L-ACNC: 2.69 UIU/ML (ref 0.45–4.5)
WBC # BLD AUTO: 7.86 THOUSAND/UL (ref 4.31–10.16)

## 2024-12-31 ENCOUNTER — RESULTS FOLLOW-UP (OUTPATIENT)
Dept: FAMILY MEDICINE CLINIC | Facility: CLINIC | Age: 74
End: 2024-12-31

## 2025-01-09 ENCOUNTER — OFFICE VISIT (OUTPATIENT)
Dept: FAMILY MEDICINE CLINIC | Facility: CLINIC | Age: 75
End: 2025-01-09
Payer: MEDICARE

## 2025-01-09 VITALS
HEART RATE: 77 BPM | DIASTOLIC BLOOD PRESSURE: 78 MMHG | HEIGHT: 66 IN | SYSTOLIC BLOOD PRESSURE: 130 MMHG | WEIGHT: 182.8 LBS | TEMPERATURE: 97.6 F | OXYGEN SATURATION: 97 % | BODY MASS INDEX: 29.38 KG/M2

## 2025-01-09 DIAGNOSIS — I12.9 BENIGN HYPERTENSION WITH CKD (CHRONIC KIDNEY DISEASE) STAGE III (HCC): ICD-10-CM

## 2025-01-09 DIAGNOSIS — E03.9 HYPOTHYROIDISM, UNSPECIFIED TYPE: ICD-10-CM

## 2025-01-09 DIAGNOSIS — Z00.00 MEDICARE ANNUAL WELLNESS VISIT, SUBSEQUENT: Primary | ICD-10-CM

## 2025-01-09 DIAGNOSIS — E78.2 MIXED HYPERLIPIDEMIA: ICD-10-CM

## 2025-01-09 DIAGNOSIS — N18.30 BENIGN HYPERTENSION WITH CKD (CHRONIC KIDNEY DISEASE) STAGE III (HCC): ICD-10-CM

## 2025-01-09 PROCEDURE — G0439 PPPS, SUBSEQ VISIT: HCPCS | Performed by: PHYSICIAN ASSISTANT

## 2025-01-09 PROCEDURE — 99214 OFFICE O/P EST MOD 30 MIN: CPT | Performed by: PHYSICIAN ASSISTANT

## 2025-01-09 NOTE — PROGRESS NOTES
Name: Ania Aaron      : 1950      MRN: [id number]  Encounter Provider: Tracy Hogan PA-C  Encounter Date: 2025   Encounter department: [facility]    Assessment & Plan  Medicare annual wellness visit, subsequent         Benign hypertension with CKD (chronic kidney disease) stage III (HCC)  Lab Results   Component Value Date    EGFR 56 2024    EGFR 57 2024    EGFR 55 2023    CREATININE 1.00 2024    CREATININE 0.99 2024    CREATININE 1.02 2023   Blood pressure is at goal continue current regimen.  Renal functions are stable Orders:    CBC and differential    Comprehensive metabolic panel    Mixed hyperlipidemia  Lipids at goal continue statin therapy and low-fat diet  Orders:    Comprehensive metabolic panel    Lipid panel    Hypothyroidism, unspecified type  TSH in normal range continue levothyroxine 88 mcg  Orders:    TSH, 3rd generation    T4      Depression Screening and Follow-up Plan: Patient was screened for depression during today's encounter. They screened negative with a PHQ-2 score of 0.    Urinary Incontinence Plan of Care: counseling topics discussed: practice Kegel (pelvic floor strengthening) exercises and use restroom every 2 hours.       Preventive health issues were discussed with patient, and age appropriate screening tests were ordered as noted in patient's After Visit Summary. Personalized health advice and appropriate referrals for health education or preventive services given if needed, as noted in patient's After Visit Summary.    History of Present Illness     Patient presents in the office for follow-up chronic conditions.  Patient has hypertension with chronic kidney disease stage III.  Her blood pressure is well-controlled with lisinopril-HCTZ 10-12.5.  Current BUN and creatinine are normal.  Her GFR is stable at 56.  Hyperlipidemia she is on simvastatin 20 mg.  Panel is acceptable.  Hepatic functions are normal.   Hypothyroidism she is on levothyroxine 88 mcg.  TSH and T4 are in normal range.       Patient Care Team:  Tracy Hogan PA-C as PCP - General (Family Medicine)  Tracy Hogan PA-C    Review of Systems   Constitutional:  Negative for activity change, appetite change and unexpected weight change.   HENT:  Negative for ear pain and sore throat.    Eyes:  Negative for visual disturbance.   Respiratory:  Negative for cough, shortness of breath and wheezing.    Cardiovascular:  Negative for chest pain and leg swelling.   Gastrointestinal:  Negative for abdominal pain, blood in stool, constipation, diarrhea, nausea and vomiting.   Genitourinary:  Negative for difficulty urinating.   Musculoskeletal:  Negative for arthralgias and myalgias.   Skin:  Negative for rash.   Neurological:  Negative for dizziness, syncope, light-headedness and headaches.   Psychiatric/Behavioral:  Negative for self-injury, sleep disturbance and suicidal ideas. The patient is not nervous/anxious.      Medical History Reviewed by provider this encounter:  Allergies  Meds  Problems       Annual Wellness Visit Questionnaire   Ania is here for her Subsequent Wellness visit.     Health Risk Assessment:   Patient rates overall health as good. Patient feels that their physical health rating is same. Patient is satisfied with their life. Eyesight was rated as same. Hearing was rated as same. Patient feels that their emotional and mental health rating is same. Patients states they are never, rarely angry. Patient states they are never, rarely unusually tired/fatigued. Pain experienced in the last 7 days has been none. Patient states that she has experienced no weight loss or gain in last 6 months.     Depression Screening:   PHQ-2 Score: 0      Fall Risk Screening:   In the past year, patient has experienced: no history of falling in past year      Urinary Incontinence Screening:   Patient has leaked urine accidently in the last six months.      Home Safety:  Patient does not have trouble with stairs inside or outside of their home. Patient has working smoke alarms and has working carbon monoxide detector. Home safety hazards include: none.     Nutrition:   Current diet is Regular.     Medications:   Patient is currently taking over-the-counter supplements. OTC medications include: see medication list. Patient is able to manage medications.     Activities of Daily Living (ADLs)/Instrumental Activities of Daily Living (IADLs):   Walk and transfer into and out of bed and chair?: Yes  Dress and groom yourself?: Yes    Bathe or shower yourself?: Yes    Feed yourself? Yes  Do your laundry/housekeeping?: Yes  Manage your money, pay your bills and track your expenses?: Yes  Make your own meals?: Yes    Do your own shopping?: Yes    Previous Hospitalizations:   Any hospitalizations or ED visits within the last 12 months?: No      Advance Care Planning:   Living will: No      Cognitive Screening:   Provider or family/friend/caregiver concerned regarding cognition?: No    PREVENTIVE SCREENINGS      Cardiovascular Screening:    General: History Lipid Disorder and Screening Current      Diabetes Screening:     General: Screening Current      Colorectal Cancer Screening:     General: Screening Current      Breast Cancer Screening:     General: Screening Current      Cervical Cancer Screening:    General: Screening Not Indicated      Osteoporosis Screening:    General: Screening Current      Lung Cancer Screening:     General: Screening Not Indicated    Screening, Brief Intervention, and Referral to Treatment (SBIRT)    Screening  Typical number of drinks in a day: 0  Typical number of drinks in a week: 0  Interpretation: Low risk drinking behavior.    Single Item Drug Screening:  How often have you used an illegal drug (including marijuana) or a prescription medication for non-medical reasons in the past year? never    Single Item Drug Screen Score:  "0  Interpretation: Negative screen for possible drug use disorder    Brief Intervention  Alcohol & drug use screenings were reviewed. No concerns regarding substance use disorder identified.     Social Drivers of Health     Financial Resource Strain: Low Risk  (1/8/2024)    Overall Financial Resource Strain (CARDIA)     Difficulty of Paying Living Expenses: Not hard at all   Food Insecurity: No Food Insecurity (1/9/2025)    Hunger Vital Sign     Worried About Running Out of Food in the Last Year: Never true     Ran Out of Food in the Last Year: Never true   Transportation Needs: No Transportation Needs (1/9/2025)    PRAPARE - Transportation     Lack of Transportation (Medical): No     Lack of Transportation (Non-Medical): No   Housing Stability: Low Risk  (1/9/2025)    Housing Stability Vital Sign     Unable to Pay for Housing in the Last Year: No     Number of Times Moved in the Last Year: 0     Homeless in the Last Year: No   Utilities: Not At Risk (1/9/2025)    [facility] Utilities     Threatened with loss of utilities: No     No results found.    Objective   /78 (BP Location: Right arm, Patient Position: Sitting, Cuff Size: Large)   Pulse 77   Temp 97.6 °F (36.4 °C) (Temporal)   Ht 5' 5.75\" (1.67 m)   Wt 82.9 kg (182 lb 12.8 oz)   SpO2 97%   Breastfeeding No   BMI 29.73 kg/m²     Physical Exam  Vitals and nursing note reviewed.   Constitutional:       General: She is not in acute distress.     Appearance: Normal appearance. She is well-developed. She is not ill-appearing or diaphoretic.   HENT:      Head: Normocephalic and atraumatic.      Right Ear: Tympanic membrane, ear canal and external ear normal.      Left Ear: Tympanic membrane, ear canal and external ear normal.      Mouth/Throat:      Pharynx: No posterior oropharyngeal erythema.   Eyes:      Conjunctiva/sclera: Conjunctivae normal.      Pupils: Pupils are equal, round, and reactive to light.   Neck:      Thyroid: No thyromegaly.      Vascular: " No carotid bruit.   Cardiovascular:      Rate and Rhythm: Normal rate and regular rhythm.      Heart sounds: Normal heart sounds. No murmur heard.     No friction rub.   Pulmonary:      Effort: Pulmonary effort is normal. No respiratory distress.      Breath sounds: Normal breath sounds. No wheezing.   Abdominal:      General: Bowel sounds are normal. There is no distension.      Palpations: Abdomen is soft. There is no mass.      Tenderness: There is no abdominal tenderness.   Musculoskeletal:      Right lower leg: No edema.      Left lower leg: No edema.   Lymphadenopathy:      Cervical: No cervical adenopathy.   Skin:     General: Skin is warm and dry.   Neurological:      General: No focal deficit present.      Mental Status: She is alert and oriented to person, place, and time.   Psychiatric:         Mood and Affect: Mood normal.         Behavior: Behavior normal.         Thought Content: Thought content normal.         Judgment: Judgment normal.

## 2025-01-09 NOTE — PATIENT INSTRUCTIONS
Medicare Preventive Visit Patient Instructions  Thank you for completing your Welcome to Medicare Visit or Medicare Annual Wellness Visit today. Your next wellness visit will be due in one year (1/10/2026).  The screening/preventive services that you may require over the next 5-10 years are detailed below. Some tests may not apply to you based off risk factors and/or age. Screening tests ordered at today's visit but not completed yet may show as past due. Also, please note that scanned in results may not display below.  Preventive Screenings:  Service Recommendations Previous Testing/Comments   Colorectal Cancer Screening  * Colonoscopy    * Fecal Occult Blood Test (FOBT)/Fecal Immunochemical Test (FIT)  * Fecal DNA/Cologuard Test  * Flexible Sigmoidoscopy Age: 45-75 years old   Colonoscopy: every 10 years (may be performed more frequently if at higher risk)  OR  FOBT/FIT: every 1 year  OR  Cologuard: every 3 years  OR  Sigmoidoscopy: every 5 years  Screening may be recommended earlier than age 45 if at higher risk for colorectal cancer. Also, an individualized decision between you and your healthcare provider will decide whether screening between the ages of 76-85 would be appropriate. Colonoscopy: 06/28/2023  FOBT/FIT: Not on file  Cologuard: Not on file  Sigmoidoscopy: Not on file    Screening Current     Breast Cancer Screening Age: 40+ years old  Frequency: every 1-2 years  Not required if history of left and right mastectomy Mammogram: 10/29/2024    Screening Current   Cervical Cancer Screening Between the ages of 21-29, pap smear recommended once every 3 years.   Between the ages of 30-65, can perform pap smear with HPV co-testing every 5 years.   Recommendations may differ for women with a history of total hysterectomy, cervical cancer, or abnormal pap smears in past. Pap Smear: Not on file    Screening Not Indicated   Hepatitis C Screening Once for adults born between 1945 and 1965  More frequently in  patients at high risk for Hepatitis C Hep C Antibody: Not on file        Diabetes Screening 1-2 times per year if you're at risk for diabetes or have pre-diabetes Fasting glucose: 92 mg/dL (12/30/2024)  A1C: 6.0 % (12/10/2020)  Screening Current   Cholesterol Screening Once every 5 years if you don't have a lipid disorder. May order more often based on risk factors. Lipid panel: 12/30/2024    Screening Not Indicated  History Lipid Disorder     Other Preventive Screenings Covered by Medicare:  Abdominal Aortic Aneurysm (AAA) Screening: covered once if your at risk. You're considered to be at risk if you have a family history of AAA.  Lung Cancer Screening: covers low dose CT scan once per year if you meet all of the following conditions: (1) Age 55-77; (2) No signs or symptoms of lung cancer; (3) Current smoker or have quit smoking within the last 15 years; (4) You have a tobacco smoking history of at least 20 pack years (packs per day multiplied by number of years you smoked); (5) You get a written order from a healthcare provider.  Glaucoma Screening: covered annually if you're considered high risk: (1) You have diabetes OR (2) Family history of glaucoma OR (3)  aged 50 and older OR (4)  American aged 65 and older  Osteoporosis Screening: covered every 2 years if you meet one of the following conditions: (1) You're estrogen deficient and at risk for osteoporosis based off medical history and other findings; (2) Have a vertebral abnormality; (3) On glucocorticoid therapy for more than 3 months; (4) Have primary hyperparathyroidism; (5) On osteoporosis medications and need to assess response to drug therapy.   Last bone density test (DXA Scan): 12/27/2024.  HIV Screening: covered annually if you're between the age of 15-65. Also covered annually if you are younger than 15 and older than 65 with risk factors for HIV infection. For pregnant patients, it is covered up to 3 times per  pregnancy.    Immunizations:  Immunization Recommendations   Influenza Vaccine Annual influenza vaccination during flu season is recommended for all persons aged >= 6 months who do not have contraindications   Pneumococcal Vaccine   * Pneumococcal conjugate vaccine = PCV13 (Prevnar 13), PCV15 (Vaxneuvance), PCV20 (Prevnar 20)  * Pneumococcal polysaccharide vaccine = PPSV23 (Pneumovax) Adults 19-65 yo with certain risk factors or if 65+ yo  If never received any pneumonia vaccine: recommend Prevnar 20 (PCV20)  Give PCV20 if previously received 1 dose of PCV13 or PPSV23   Hepatitis B Vaccine 3 dose series if at intermediate or high risk (ex: diabetes, end stage renal disease, liver disease)   Respiratory syncytial virus (RSV) Vaccine - COVERED BY MEDICARE PART D  * RSVPreF3 (Arexvy) CDC recommends that adults 60 years of age and older may receive a single dose of RSV vaccine using shared clinical decision-making (SCDM)   Tetanus (Td) Vaccine - COST NOT COVERED BY MEDICARE PART B Following completion of primary series, a booster dose should be given every 10 years to maintain immunity against tetanus. Td may also be given as tetanus wound prophylaxis.   Tdap Vaccine - COST NOT COVERED BY MEDICARE PART B Recommended at least once for all adults. For pregnant patients, recommended with each pregnancy.   Shingles Vaccine (Shingrix) - COST NOT COVERED BY MEDICARE PART B  2 shot series recommended in those 19 years and older who have or will have weakened immune systems or those 50 years and older     Health Maintenance Due:      Topic Date Due   • Hepatitis C Screening  Never done   • Breast Cancer Screening: Mammogram  10/29/2025   • DXA SCAN  12/27/2026   • Colorectal Cancer Screening  06/25/2033     Immunizations Due:      Topic Date Due   • Influenza Vaccine (1) 09/01/2024   • COVID-19 Vaccine (4 - 2024-25 season) 09/01/2024     Advance Directives   What are advance directives?  Advance directives are legal documents  that state your wishes and plans for medical care. These plans are made ahead of time in case you lose your ability to make decisions for yourself. Advance directives can apply to any medical decision, such as the treatments you want, and if you want to donate organs.   What are the types of advance directives?  There are many types of advance directives, and each state has rules about how to use them. You may choose a combination of any of the following:  Living will:  This is a written record of the treatment you want. You can also choose which treatments you do not want, which to limit, and which to stop at a certain time. This includes surgery, medicine, IV fluid, and tube feedings.   Durable power of  for healthcare (DPAHC):  This is a written record that states who you want to make healthcare choices for you when you are unable to make them for yourself. This person, called a proxy, is usually a family member or a friend. You may choose more than 1 proxy.  Do not resuscitate (DNR) order:  A DNR order is used in case your heart stops beating or you stop breathing. It is a request not to have certain forms of treatment, such as CPR. A DNR order may be included in other types of advance directives.  Medical directive:  This covers the care that you want if you are in a coma, near death, or unable to make decisions for yourself. You can list the treatments you want for each condition. Treatment may include pain medicine, surgery, blood transfusions, dialysis, IV or tube feedings, and a ventilator (breathing machine).  Values history:  This document has questions about your views, beliefs, and how you feel and think about life. This information can help others choose the care that you would choose.  Why are advance directives important?  An advance directive helps you control your care. Although spoken wishes may be used, it is better to have your wishes written down. Spoken wishes can be misunderstood, or  not followed. Treatments may be given even if you do not want them. An advance directive may make it easier for your family to make difficult choices about your care.   Urinary Incontinence   Urinary incontinence (UI)  is when you lose control of your bladder. UI develops because your bladder cannot store or empty urine properly. The 3 most common types of UI are stress incontinence, urge incontinence, or both.  Medicines:   May be given to help strengthen your bladder control. Report any side effects of medication to your healthcare provider.  Do pelvic muscle exercises often:  Your pelvic muscles help you stop urinating. Squeeze these muscles tight for 5 seconds, then relax for 5 seconds. Gradually work up to squeezing for 10 seconds. Do 3 sets of 15 repetitions a day, or as directed. This will help strengthen your pelvic muscles and improve bladder control.  Train your bladder:  Go to the bathroom at set times, such as every 2 hours, even if you do not feel the urge to go. You can also try to hold your urine when you feel the urge to go. For example, hold your urine for 5 minutes when you feel the urge to go. As that becomes easier, hold your urine for 10 minutes.   Self-care:   Keep a UI record.  Write down how often you leak urine and how much you leak. Make a note of what you were doing when you leaked urine.  Drink liquids as directed. You may need to limit the amount of liquid you drink to help control your urine leakage. Do not drink any liquid right before you go to bed. Limit or do not have drinks that contain caffeine or alcohol.   Prevent constipation.  Eat a variety of high-fiber foods. Good examples are high-fiber cereals, beans, vegetables, and whole-grain breads. Walking is the best way to trigger your intestines to have a bowel movement.  Exercise regularly and maintain a healthy weight.  Weight loss and exercise will decrease pressure on your bladder and help you control your leakage.   Use a  catheter as directed  to help empty your bladder. A catheter is a tiny, plastic tube that is put into your bladder to drain your urine.   Go to behavior therapy as directed.  Behavior therapy may be used to help you learn to control your urge to urinate.    Weight Management   Why it is important to manage your weight:  Being overweight increases your risk of health conditions such as heart disease, high blood pressure, type 2 diabetes, and certain types of cancer. It can also increase your risk for osteoarthritis, sleep apnea, and other respiratory problems. Aim for a slow, steady weight loss. Even a small amount of weight loss can lower your risk of health problems.  How to lose weight safely:  A safe and healthy way to lose weight is to eat fewer calories and get regular exercise. You can lose up about 1 pound a week by decreasing the number of calories you eat by 500 calories each day.   Healthy meal plan for weight management:  A healthy meal plan includes a variety of foods, contains fewer calories, and helps you stay healthy. A healthy meal plan includes the following:  Eat whole-grain foods more often.  A healthy meal plan should contain fiber. Fiber is the part of grains, fruits, and vegetables that is not broken down by your body. Whole-grain foods are healthy and provide extra fiber in your diet. Some examples of whole-grain foods are whole-wheat breads and pastas, oatmeal, brown rice, and bulgur.  Eat a variety of vegetables every day.  Include dark, leafy greens such as spinach, kale, omar greens, and mustard greens. Eat yellow and orange vegetables such as carrots, sweet potatoes, and winter squash.   Eat a variety of fruits every day.  Choose fresh or canned fruit (canned in its own juice or light syrup) instead of juice. Fruit juice has very little or no fiber.  Eat low-fat dairy foods.  Drink fat-free (skim) milk or 1% milk. Eat fat-free yogurt and low-fat cottage cheese. Try low-fat cheeses such  as mozzarella and other reduced-fat cheeses.  Choose meat and other protein foods that are low in fat.  Choose beans or other legumes such as split peas or lentils. Choose fish, skinless poultry (chicken or turkey), or lean cuts of red meat (beef or pork). Before you cook meat or poultry, cut off any visible fat.   Use less fat and oil.  Try baking foods instead of frying them. Add less fat, such as margarine, sour cream, regular salad dressing and mayonnaise to foods. Eat fewer high-fat foods. Some examples of high-fat foods include french fries, doughnuts, ice cream, and cakes.  Eat fewer sweets.  Limit foods and drinks that are high in sugar. This includes candy, cookies, regular soda, and sweetened drinks.  Exercise:  Exercise at least 30 minutes per day on most days of the week. Some examples of exercise include walking, biking, dancing, and swimming. You can also fit in more physical activity by taking the stairs instead of the elevator or parking farther away from stores. Ask your healthcare provider about the best exercise plan for you.      © Copyright [employer] 2018 Information is for End User's use only and may not be sold, redistributed or otherwise used for commercial purposes. All illustrations and images included in CareNotes® are the copyrighted property of [employer] or [employer]

## 2025-01-09 NOTE — ASSESSMENT & PLAN NOTE
Lab Results   Component Value Date    EGFR 56 12/30/2024    EGFR 57 06/28/2024    EGFR 55 12/28/2023    CREATININE 1.00 12/30/2024    CREATININE 0.99 06/28/2024    CREATININE 1.02 12/28/2023   Blood pressure is at goal continue current regimen.  Renal functions are stable Orders:    CBC and differential    Comprehensive metabolic panel

## 2025-01-09 NOTE — ASSESSMENT & PLAN NOTE
Lipids at goal continue statin therapy and low-fat diet  Orders:    Comprehensive metabolic panel    Lipid panel

## 2025-03-15 DIAGNOSIS — E78.5 HYPERLIPIDEMIA, UNSPECIFIED HYPERLIPIDEMIA TYPE: ICD-10-CM

## 2025-03-15 DIAGNOSIS — I10 ESSENTIAL HYPERTENSION: ICD-10-CM

## 2025-03-17 RX ORDER — LISINOPRIL AND HYDROCHLOROTHIAZIDE 10; 12.5 MG/1; MG/1
1 TABLET ORAL DAILY
Qty: 90 TABLET | Refills: 1 | Status: SHIPPED | OUTPATIENT
Start: 2025-03-17

## 2025-03-17 RX ORDER — SIMVASTATIN 20 MG
20 TABLET ORAL DAILY
Qty: 90 TABLET | Refills: 1 | Status: SHIPPED | OUTPATIENT
Start: 2025-03-17

## 2025-04-08 DIAGNOSIS — R42 VERTIGO: ICD-10-CM

## 2025-04-08 RX ORDER — MECLIZINE HYDROCHLORIDE 25 MG/1
25 TABLET ORAL EVERY 8 HOURS PRN
Qty: 30 TABLET | Refills: 0 | Status: SHIPPED | OUTPATIENT
Start: 2025-04-08

## 2025-04-08 NOTE — TELEPHONE ENCOUNTER
Reason for call:   [x] Refill   [] Prior Auth  [] Other:     Office:   [x] PCP/Provider -   [] Specialty/Provider -     Medication: meclizine (ANTIVERT) 25 mg    Dose/Frequency:  Take 1 tablet (25 mg total) by mouth every 8 (eight) hours as needed for dizziness     Quantity: 30    Pharmacy: [id number] - [place], [place] - [address] [facility] [place] [facility]   Does the patient have enough for 3 days?   [] Yes   [x] No - Send as HP to POD    Mail Away Pharmacy   Does the patient have enough for 10 days?   [] Yes   [] No - Send as HP to POD

## 2025-05-03 DIAGNOSIS — E03.9 HYPOTHYROIDISM, UNSPECIFIED TYPE: ICD-10-CM

## 2025-05-04 RX ORDER — LEVOTHYROXINE SODIUM 88 UG/1
88 TABLET ORAL DAILY
Qty: 90 TABLET | Refills: 1 | Status: SHIPPED | OUTPATIENT
Start: 2025-05-04

## 2025-06-22 ENCOUNTER — HOSPITAL ENCOUNTER (EMERGENCY)
Facility: HOSPITAL | Age: 75
Discharge: HOME | End: 2025-06-22
Attending: EMERGENCY MEDICINE | Admitting: EMERGENCY MEDICINE
Payer: MEDICARE

## 2025-06-22 VITALS
WEIGHT: 175 LBS | HEART RATE: 80 BPM | HEIGHT: 66 IN | OXYGEN SATURATION: 98 % | SYSTOLIC BLOOD PRESSURE: 174 MMHG | DIASTOLIC BLOOD PRESSURE: 74 MMHG | RESPIRATION RATE: 18 BRPM | TEMPERATURE: 98.2 F | BODY MASS INDEX: 28.12 KG/M2

## 2025-06-22 DIAGNOSIS — M54.32 SCIATICA OF LEFT SIDE: Primary | ICD-10-CM

## 2025-06-22 LAB
BILIRUB UR QL STRIP: NEGATIVE
CLARITY UR: CLEAR
COLOR UR: NORMAL
GLUCOSE UR STRIP-MCNC: NEGATIVE MG/DL
HGB UR QL STRIP.AUTO: NEGATIVE
KETONES UR STRIP-MCNC: NEGATIVE MG/DL
LEUKOCYTE ESTERASE UR QL STRIP: NEGATIVE
NITRITE UR QL STRIP: NEGATIVE
PH UR STRIP.AUTO: 6 [PH]
PROT UR STRIP-MCNC: NEGATIVE MG/DL
SP GR UR STRIP.AUTO: 1.02 (ref 1–1.03)
UROBILINOGEN UR STRIP-ACNC: <2 MG/DL

## 2025-06-22 PROCEDURE — 99283 EMERGENCY DEPT VISIT LOW MDM: CPT

## 2025-06-22 PROCEDURE — 99284 EMERGENCY DEPT VISIT MOD MDM: CPT | Performed by: EMERGENCY MEDICINE

## 2025-06-22 PROCEDURE — 81003 URINALYSIS AUTO W/O SCOPE: CPT

## 2025-06-22 RX ORDER — LIDOCAINE 50 MG/G
1 PATCH TOPICAL ONCE
Status: DISCONTINUED | OUTPATIENT
Start: 2025-06-22 | End: 2025-06-22 | Stop reason: HOSPADM

## 2025-06-22 RX ORDER — ACETAMINOPHEN 325 MG/1
975 TABLET ORAL ONCE
Status: COMPLETED | OUTPATIENT
Start: 2025-06-22 | End: 2025-06-22

## 2025-06-22 RX ORDER — LIDOCAINE 50 MG/G
1 PATCH TOPICAL DAILY
Qty: 7 PATCH | Refills: 0 | Status: SHIPPED | OUTPATIENT
Start: 2025-06-22 | End: 2025-06-29

## 2025-06-22 RX ORDER — DIAZEPAM 5 MG/1
5 TABLET ORAL ONCE
Status: COMPLETED | OUTPATIENT
Start: 2025-06-22 | End: 2025-06-22

## 2025-06-22 RX ORDER — METHOCARBAMOL 500 MG/1
500 TABLET, FILM COATED ORAL 2 TIMES DAILY
Qty: 20 TABLET | Refills: 0 | Status: SHIPPED | OUTPATIENT
Start: 2025-06-22

## 2025-06-22 RX ORDER — IBUPROFEN 600 MG/1
600 TABLET, FILM COATED ORAL ONCE
Status: COMPLETED | OUTPATIENT
Start: 2025-06-22 | End: 2025-06-22

## 2025-06-22 RX ADMIN — ACETAMINOPHEN 975 MG: 325 TABLET ORAL at 20:29

## 2025-06-22 RX ADMIN — LIDOCAINE 1 PATCH: 700 PATCH TOPICAL at 20:29

## 2025-06-22 RX ADMIN — IBUPROFEN 600 MG: 600 TABLET, FILM COATED ORAL at 17:00

## 2025-06-22 RX ADMIN — DIAZEPAM 5 MG: 5 TABLET ORAL at 20:29

## 2025-06-23 NOTE — ED PROVIDER NOTES
Time reflects when diagnosis was documented in both MDM as applicable and the Disposition within this note       Time User Action Codes Description Comment    6/22/2025  8:22 PM Celestino Doshi Add [M54.32] Sciatica of left side           ED Disposition       ED Disposition   Discharge    Condition   Stable    Date/Time   Sun Jun 22, 2025  8:22 PM    Comment   Ania Aaron discharge to home/self care.                   Assessment & Plan       Medical Decision Making  Female patient who presented to the emergency department for left-sided back pain.  On physical examination, patient was noted to have tenderness to palpation of the left paraspinal lumbar region along the superior glutes.  Patient's labs showed no acute concerns of urinary tract infection.  While in the emergency department, patient was given Tylenol, Motrin, Valium, and lidocaine patch.  Patient was plan for discharge and advised follow-up outpatient with PCP as well as given a referral to comprehensive spine program.  She was given a prescription for Robaxin and lidocaine patches as needed at home.  She was instructed to return to the emergency department if her symptoms worsen.  She was agreeable to plan.    Differential diagnoses include but not limited to musculoskeletal injury, sciatica, doubt compression fracture, doubt cauda equina syndrome.    Risk  OTC drugs.  Prescription drug management.             Medications   ibuprofen (MOTRIN) tablet 600 mg (600 mg Oral Given 6/22/25 1700)   diazepam (VALIUM) tablet 5 mg (5 mg Oral Given 6/22/25 2029)   acetaminophen (TYLENOL) tablet 975 mg (975 mg Oral Given 6/22/25 2029)       ED Risk Strat Scores                    No data recorded        SBIRT 20yo+      Flowsheet Row Most Recent Value   Initial Alcohol Screen: US AUDIT-C     1. How often do you have a drink containing alcohol? 0 Filed at: 06/22/2025 3646   2. How many drinks containing alcohol do you have on a typical day you are drinking?  0 Filed  at: 06/22/2025 1620   3a. Male UNDER 65: How often do you have five or more drinks on one occasion? 0 Filed at: 06/22/2025 1623   3b. FEMALE Any Age, or MALE 65+: How often do you have 4 or more drinks on one occassion? 0 Filed at: 06/22/2025 1629   Audit-C Score 0 Filed at: 06/22/2025 1629   EPI: How many times in the past year have you...    Used an illegal drug or used a prescription medication for non-medical reasons? Never Filed at: 06/22/2025 1626                            History of Present Illness       Chief Complaint   Patient presents with    Back Pain     Patient reports left lower back pain with pain radiating into left leg starting this AM after cleaning bathroom. (-) injury/trauma. Last Tyelnol @ 1200.        Past Medical History[1]   Past Surgical History[2]   Family History[3]   Social History[4]   E-Cigarette/Vaping    E-Cigarette Use Never User       E-Cigarette/Vaping Substances    Nicotine No     THC No     CBD No     Flavoring No     Other No     Unknown No       I have reviewed and agree with the history as documented.     74-year-old female with significant PMH including HTN and HLD who presented to the emergency department after injury to her back.  Patient states that earlier today she was cleaning the house when she bent over using the vacuum and started to have left lower back pain.  Patient states that her pain is in her left buttock with radiation down her lateral left leg.  She describes the pain as a shooting pain.  She took Tylenol at home as well as Motrin while waiting in the emergency department waiting room with minimal to no relief in her symptoms.  Patient denies any recent steroid use, urinary or bowel retention/incontinence, denies saddle anesthesia.  Patient's been having difficulty ambulating due to the pain.  Denies fall, LOC, or head strike. Denies chest pain, SOB, cough, abdominal pain, n/v/d, fever, chills, dizziness, lightheadedness, HA, dysuria, hematuria,  hematochezia, or melena.           Review of Systems   Constitutional:  Negative for appetite change, chills, diaphoresis, fatigue and fever.   HENT:  Negative for congestion, ear pain, postnasal drip, rhinorrhea, sore throat and trouble swallowing.    Eyes:  Negative for pain and visual disturbance.   Respiratory:  Negative for cough and shortness of breath.    Cardiovascular:  Negative for chest pain and palpitations.   Gastrointestinal:  Negative for abdominal pain, constipation, diarrhea, nausea and vomiting.   Genitourinary:  Negative for decreased urine volume, dysuria and hematuria.   Musculoskeletal:  Positive for back pain. Negative for arthralgias.   Skin:  Negative for color change and rash.   Neurological:  Negative for dizziness, seizures, syncope, weakness, light-headedness and headaches.   All other systems reviewed and are negative.          Objective       ED Triage Vitals   Temperature Pulse Blood Pressure Respirations SpO2 Patient Position - Orthostatic VS   06/22/25 1634 06/22/25 1630 06/22/25 1630 06/22/25 1630 06/22/25 1630 06/22/25 1630   98.2 °F (36.8 °C) 80 (!) 174/74 18 98 % Sitting      Temp Source Heart Rate Source BP Location FiO2 (%) Pain Score    06/22/25 1634 06/22/25 1630 06/22/25 1630 -- 06/22/25 1630    Oral Monitor Right arm  8      Vitals      Date and Time Temp Pulse SpO2 Resp BP Pain Score FACES Pain Rating User   06/22/25 1634 98.2 °F (36.8 °C) -- -- -- -- -- -- AW   06/22/25 1630 -- 80 98 % 18 174/74 8 -- SB            Physical Exam  Vitals and nursing note reviewed.   Constitutional:       General: She is not in acute distress.     Appearance: Normal appearance. She is normal weight.   HENT:      Head: Normocephalic and atraumatic.      Right Ear: External ear normal.      Left Ear: External ear normal.      Nose: Nose normal.      Mouth/Throat:      Pharynx: Oropharynx is clear.     Eyes:      Conjunctiva/sclera: Conjunctivae normal.     Pulmonary:      Effort: Pulmonary  effort is normal.     Musculoskeletal:         General: Tenderness present. No swelling, deformity or signs of injury. Normal range of motion.      Cervical back: Normal range of motion.      Comments: Tenderness to palpation of the left paraspinal lumbar region along the left superior glutes.  Patient had antalgic gait due to pain.  Positive straight leg raise on left side.     Skin:     General: Skin is warm.     Neurological:      Mental Status: She is alert and oriented to person, place, and time. Mental status is at baseline.         Results Reviewed       Procedure Component Value Units Date/Time    UA w Reflex to Microscopic w Reflex to Culture [[id number]] Collected: 06/22/25 1740    Lab Status: Final result Specimen: Urine, Clean Catch Updated: 06/22/25 1808     Color, UA Light Yellow     Clarity, UA Clear     Specific Gravity, UA 1.024     pH, UA 6.0     Leukocytes, UA Negative     Nitrite, UA Negative     Protein, UA Negative mg/dl      Glucose, UA Negative mg/dl      Ketones, UA Negative mg/dl      Urobilinogen, UA <2.0 mg/dl      Bilirubin, UA Negative     Occult Blood, UA Negative            No orders to display       Procedures    ED Medication and Procedure Management   Prior to Admission Medications   Prescriptions Last Dose Informant Patient Reported? Taking?   Calcium Citrate-Vitamin D 315-250 MG-UNIT TABS  Self Yes No   Sig: Take by mouth   Cholecalciferol (VITAMIN D3) 1000 units CAPS  Self Yes No   Sig: Take 1 capsule by mouth daily   Omega-3 Fatty Acids (FISH OIL) 645 MG CAPS  Self Yes No   Sig: Take by mouth   coenzyme Q-10 100 MG capsule  Self Yes No   Sig: Take by mouth   levothyroxine 88 mcg tablet   No No   Sig: TAKE 1 TABLET BY MOUTH EVERY DAY   lisinopril-hydrochlorothiazide (PRINZIDE,ZESTORETIC) 10-12.5 MG per tablet   No No   Sig: TAKE 1 TABLET BY MOUTH EVERY DAY   meclizine (ANTIVERT) 25 mg tablet   No No   Sig: Take 1 tablet (25 mg total) by mouth every 8 (eight) hours as needed for  dizziness   simvastatin (ZOCOR) 20 mg tablet   No No   Sig: TAKE 1 TABLET BY MOUTH EVERY DAY      Facility-Administered Medications: None     Discharge Medication List as of 6/22/2025  8:23 PM        START taking these medications    Details   lidocaine (LIDODERM) 5 % Apply 1 patch topically daily over 12 hours for 7 days Remove & Discard patch within 12 hours or as directed by MD for pain, Starting Sun 6/22/2025, Until Sun 6/29/2025, Normal      methocarbamol (ROBAXIN) 500 mg tablet Take 1 tablet (500 mg total) by mouth 2 (two) times a day, Starting Sun 6/22/2025, Normal           CONTINUE these medications which have NOT CHANGED    Details   Calcium Citrate-Vitamin D 315-250 MG-UNIT TABS Take by mouth, Historical Med      Cholecalciferol (VITAMIN D3) 1000 units CAPS Take 1 capsule by mouth daily, Starting Wed 9/25/2013, Historical Med      coenzyme Q-10 100 MG capsule Take by mouth, Historical Med      levothyroxine 88 mcg tablet TAKE 1 TABLET BY MOUTH EVERY DAY, Starting Sun 5/4/2025, Normal      lisinopril-hydrochlorothiazide (PRINZIDE,ZESTORETIC) 10-12.5 MG per tablet TAKE 1 TABLET BY MOUTH EVERY DAY, Starting Mon 3/17/2025, Normal      meclizine (ANTIVERT) 25 mg tablet Take 1 tablet (25 mg total) by mouth every 8 (eight) hours as needed for dizziness, Starting Tue 4/8/2025, Normal      Omega-3 Fatty Acids (FISH OIL) 645 MG CAPS Take by mouth, Historical Med      simvastatin (ZOCOR) 20 mg tablet TAKE 1 TABLET BY MOUTH EVERY DAY, Starting Mon 3/17/2025, Normal             ED SEPSIS DOCUMENTATION   Time reflects when diagnosis was documented in both MDM as applicable and the Disposition within this note       Time User Action Codes Description Comment    6/22/2025  8:22 PM Celestino Doshi Add [M54.32] Sciatica of left side                      [1]   Past Medical History:  Diagnosis Date    Hyperlipidemia     Hypertension     PONV (postoperative nausea and vomiting)     had n/v post last coloscopy   [2]   Past  Surgical History:  Procedure Laterality Date    COLONOSCOPY      TUBAL LIGATION     [3]   Family History  Problem Relation Name Age of Onset    Diabetes Mother          DM    No Known Problems Father      Leukemia Sister  70    Lymphoma Sister  55    No Known Problems Daughter      No Known Problems Sister      No Known Problems Sister      No Known Problems Maternal Grandmother      No Known Problems Maternal Grandfather      No Known Problems Paternal Grandmother      No Known Problems Paternal Grandfather      No Known Problems Son     [4]   Social History  Tobacco Use    Smoking status: Never     Passive exposure: Past    Smokeless tobacco: Never   Vaping Use    Vaping status: Never Used   Substance Use Topics    Alcohol use: Not Currently     Comment: rare    Drug use: No        Celestino Doshi MD  06/23/25 0134

## 2025-06-23 NOTE — ED ATTENDING ATTESTATION
6/22/2025  IEnrique DO, saw and evaluated the patient. I have discussed the patient with the resident/non-physician practitioner and agree with the resident's/non-physician practitioner's findings, Plan of Care, and MDM as documented in the resident's/non-physician practitioner's note, except where noted. All available labs and Radiology studies were reviewed.  I was present for key portions of any procedure(s) performed by the resident/non-physician practitioner and I was immediately available to provide assistance.       At this point I agree with the current assessment done in the Emergency Department.  I have conducted an independent evaluation of this patient a history and physical is as follows:    73 yo female with left lower back pain radiating to the left thigh, to the level of the knee, started today after she was doing some work in a bent over the flex position.  Denies any trauma.  No loss of bowel or bladder control.  No fevers or chills, no midline back tenderness.  No history of osteoporosis.  No drug use.    PE:  The patient is well appearing, non-toxic, in NAD. Head: normocephalic, atraumatic. HEENT: mucous membranes moist.  Lungs: CTA b/l, no resp distress. Heart: RRR. No M/R/G. Abdomen: NT, ND, no R/R/G. Neuro: CN2-12 intact, GCS 15. Normal strength and sensation, normal speech and gait. Cap refill < 2 sec, skin warm and dry. No rashes or lesions.  Positive straight leg raise test on the left.  No midline spinal tenderness.  Able to stand and walk with a mildly antalgic gait.    74-year-old female with likely lumbar radiculopathy on the left.  No red flag signs or symptoms.  Stable for discharge home, with as needed Tylenol and Motrin, muscle relaxers, follow-up with physical therapy outpatient.    ED Course         Critical Care Time  Procedures

## 2025-06-24 ENCOUNTER — NURSE TRIAGE (OUTPATIENT)
Dept: PHYSICAL THERAPY | Facility: OTHER | Age: 75
End: 2025-06-24

## 2025-06-24 DIAGNOSIS — M54.42 ACUTE LEFT-SIDED LOW BACK PAIN WITH LEFT-SIDED SCIATICA: Primary | ICD-10-CM

## 2025-06-24 NOTE — TELEPHONE ENCOUNTER
Background - Initial Assessment  Clinical complaint: left lower back pain radiating to the buttocks and knee. States no numbness or tingling. States this pain started 6/22/25. States the pain started after bending over to clean her bathroom.  Date of onset: 6/22/25  Frequency of pain: intermittent   Quality of pain: sharp and shooting    Protocols used: [facility] Protocol    Red Flag and Start Back documentation is currently located under Additional Charting.    [facility] was reviewed in detail and what we can provide for their back pain.  Patient is agreeable to being triaged and would like to proceed with Physical Therapy.    Referral was placed for Physical Therapy at the [facility] site. Patients information was sent to the  to make evaluation appointment. Patient made aware that the PT office  will be calling to schedule the appointment.  Patient was provided with the phone number to the PT office.    No further questions and/or concerns were voiced by the patient at this time. Patient states understanding of the referral that was placed.    Referral Closed.

## 2025-07-03 ENCOUNTER — APPOINTMENT (OUTPATIENT)
Dept: LAB | Facility: CLINIC | Age: 75
End: 2025-07-03
Payer: MEDICARE

## 2025-07-03 LAB
ALBUMIN SERPL BCG-MCNC: 4.1 G/DL (ref 3.5–5)
ALP SERPL-CCNC: 50 U/L (ref 34–104)
ALT SERPL W P-5'-P-CCNC: 11 U/L (ref 7–52)
ANION GAP SERPL CALCULATED.3IONS-SCNC: 8 MMOL/L (ref 4–13)
AST SERPL W P-5'-P-CCNC: 17 U/L (ref 13–39)
BASOPHILS # BLD AUTO: 0.03 THOUSANDS/ÂΜL (ref 0–0.1)
BASOPHILS NFR BLD AUTO: 0 % (ref 0–1)
BILIRUB SERPL-MCNC: 0.69 MG/DL (ref 0.2–1)
BUN SERPL-MCNC: 20 MG/DL (ref 5–25)
CALCIUM SERPL-MCNC: 9.2 MG/DL (ref 8.4–10.2)
CHLORIDE SERPL-SCNC: 102 MMOL/L (ref 96–108)
CHOLEST SERPL-MCNC: 142 MG/DL (ref ?–200)
CO2 SERPL-SCNC: 28 MMOL/L (ref 21–32)
CREAT SERPL-MCNC: 0.93 MG/DL (ref 0.6–1.3)
EOSINOPHIL # BLD AUTO: 0.24 THOUSAND/ÂΜL (ref 0–0.61)
EOSINOPHIL NFR BLD AUTO: 4 % (ref 0–6)
ERYTHROCYTE [DISTWIDTH] IN BLOOD BY AUTOMATED COUNT: 14.1 % (ref 11.6–15.1)
GFR SERPL CREATININE-BSD FRML MDRD: 61 ML/MIN/1.73SQ M
GLUCOSE P FAST SERPL-MCNC: 89 MG/DL (ref 65–99)
HCT VFR BLD AUTO: 41.4 % (ref 34.8–46.1)
HDLC SERPL-MCNC: 41 MG/DL
HGB BLD-MCNC: 13.9 G/DL (ref 11.5–15.4)
IMM GRANULOCYTES # BLD AUTO: 0.01 THOUSAND/UL (ref 0–0.2)
IMM GRANULOCYTES NFR BLD AUTO: 0 % (ref 0–2)
LDLC SERPL CALC-MCNC: 69 MG/DL (ref 0–100)
LYMPHOCYTES # BLD AUTO: 1.89 THOUSANDS/ÂΜL (ref 0.6–4.47)
LYMPHOCYTES NFR BLD AUTO: 27 % (ref 14–44)
MCH RBC QN AUTO: 29 PG (ref 26.8–34.3)
MCHC RBC AUTO-ENTMCNC: 33.6 G/DL (ref 31.4–37.4)
MCV RBC AUTO: 86 FL (ref 82–98)
MONOCYTES # BLD AUTO: 0.71 THOUSAND/ÂΜL (ref 0.17–1.22)
MONOCYTES NFR BLD AUTO: 10 % (ref 4–12)
NEUTROPHILS # BLD AUTO: 4.03 THOUSANDS/ÂΜL (ref 1.85–7.62)
NEUTS SEG NFR BLD AUTO: 59 % (ref 43–75)
NONHDLC SERPL-MCNC: 101 MG/DL
NRBC BLD AUTO-RTO: 0 /100 WBCS
PLATELET # BLD AUTO: 237 THOUSANDS/UL (ref 149–390)
PMV BLD AUTO: 10.1 FL (ref 8.9–12.7)
POTASSIUM SERPL-SCNC: 4.8 MMOL/L (ref 3.5–5.3)
PROT SERPL-MCNC: 7 G/DL (ref 6.4–8.4)
RBC # BLD AUTO: 4.79 MILLION/UL (ref 3.81–5.12)
SODIUM SERPL-SCNC: 138 MMOL/L (ref 135–147)
T4 SERPL-MCNC: 11.38 UG/DL (ref 6.09–12.23)
TRIGL SERPL-MCNC: 162 MG/DL (ref ?–150)
TSH SERPL DL<=0.05 MIU/L-ACNC: 2.15 UIU/ML (ref 0.45–4.5)
WBC # BLD AUTO: 6.91 THOUSAND/UL (ref 4.31–10.16)

## 2025-07-09 ENCOUNTER — RA CDI HCC (OUTPATIENT)
Dept: OTHER | Facility: HOSPITAL | Age: 75
End: 2025-07-09

## 2025-07-10 ENCOUNTER — EVALUATION (OUTPATIENT)
Dept: PHYSICAL THERAPY | Facility: CLINIC | Age: 75
End: 2025-07-10
Payer: MEDICARE

## 2025-07-10 DIAGNOSIS — M54.42 ACUTE LEFT-SIDED LOW BACK PAIN WITH LEFT-SIDED SCIATICA: Primary | ICD-10-CM

## 2025-07-10 PROCEDURE — 97112 NEUROMUSCULAR REEDUCATION: CPT | Performed by: PHYSICAL THERAPIST

## 2025-07-10 PROCEDURE — 97162 PT EVAL MOD COMPLEX 30 MIN: CPT | Performed by: PHYSICAL THERAPIST

## 2025-07-10 NOTE — PROGRESS NOTES
PT Evaluation     Today's date: 7/10/2025  Patient name: Ania Aaron  : 1950  MRN: [id number]  Referring provider: Ryan Yang, RAYMOND  Dx:   Encounter Diagnosis     ICD-10-CM    1. Acute left-sided low back pain with left-sided sciatica  M54.42                      Assessment  Impairments: abnormal muscle firing, abnormal or restricted ROM, activity intolerance, pain with function and poor body mechanics  Symptom irritability: high    Assessment details: Ania Aaron is a 74 y.o. female who presents with signs and symptoms consistent of lumbar radiculopathy based on subjective report and objective findings. Patient presents with pain, decreased strength, decreased ROM, decreased joint mobility, and ambulatory dysfunction. Such limitations are ipmacting her ability to ambulate, garden, or sleep without pain. No referral necessary at this time as patient has intact myotomal strength, reflexes, and sensation at this time. Will continue to assess moving forwards. Patient responds favorably to flexion-based program. Patient would benefit from a comprehensive HEP focusing on improving said deficits.  Patient was educated on and provided with an at home exercise program this date to be completed. Patient verbalized understanding of the importance of completion. Patient would benefit from skilled physical therapy to address the impairments, improve their level of function, and to improve their overall quality of life. PT POC 2x/wk for 6 weeksD  Understanding of Dx/Px/POC: good     Prognosis: good  Prognosis details: Positive prognostic indicators include: absence of observed red flags, positive attitude towards recovery, good understanding of diagnosis and treatment plan   Negative prognostic indicators include:     Goals  STG: To be achieved in 4 -6 weeks  1)Improve lumbar spine ROM by 25%   2)Reduce pain by 50%  3)Improve flexibility to mild restrictions  4)Improve strength in lower extremity by 1/2  MMT    LTG: To be achieved at Discharge  1)Patient is independent with HEP  2)Return to pre-morbid work related activities  3)Improve tolerance to prolonged sitting, standing, and walking to prior level of function    Plan  Patient would benefit from: skilled physical therapy    Planned therapy interventions: manual therapy, neuromuscular re-education, patient education, therapeutic activities, therapeutic exercise and home exercise program    Frequency: 1x/week.  Duration in weeks: 6  Plan of Care beginning date: 7/10/2025  Plan of Care expiration date: 2025  Treatment plan discussed with: patient        Subjective Evaluation    History of Present Illness  Mechanism of injury: History: Pt presented to the ED for L-sided LBP that radiated into the L leg that started after cleaning the bathroom on . She was given muscle relaxers and Lidocaine patches. She is feeling a little bit better now and has been taking it easy. If she stands up straight, it shoots down into her left thigh. She doesn't have much back pain. Had a prior episode of LBP 2 years ago.     Aggravating: standing, walking, being on her feet, going up/down stiars    Alleviating: Sitting   Sleeping: rolling over in bed; can fall asleep and stay asleep if she's not rolling over   Social support: lives with    Hobbies/occupation: gardening   Imaging: none   Exercise none  Red flags: Ania denies a new onset of Bladder incontinence, Bowel dysfunction, Dysarthria, Drop attacks, Clumsy or unsteadiness, Constant night pain, Tingling, Numbness, and Saddle anesthesia     Patient Goals  Patient goals for therapy: decreased pain    Pain  Current pain ratin (sitting)  At best pain ratin  At worst pain ratin  Location: shooting pain down the leg          Objective  GAIT: antalgic gait       Lumbar    Flex. 70*   Extn. -10 from neutral*    SB Left 2*   SB Right 15   ROT Left NT   ROT Right NT      Repetitive testing: extension= worse   Flexion= no change            MMT     Hip       L       R   Flex. 4 4   Extn.     Abd. 4 4   Add. 4 4   IR. 4 4   ER 4 4                  MMT     Knee         L        R   Flex. 4 4   Extn. 3+ * 4+                           MMT     Ankle       L        R   PF 4 4   DF. 5 5   EHL 5 5      Unable to complete heel toe walk d/t pain    Posture: FHP, unable to achieve neutral   Mobility: unable to assess as pt could not tolerate laying or being prone                  Slump test: L=  pos   R=  neg    Straight leg raise:   L=  unable to tolerate positioning    R=     unable to tolerate positioning           Transverse abdominis: Bent knee fall out= Fair supine march=Poor     Sensation:  in tact   Reflexes: 2+ B achilles and patellar         Hip/SI joint:   able to tolerate hip flexion PROM WNL            Precautions: spinal stenosis, CKD stage 3     Re-Eval: 7/10-8/21   Authorization: [facility]   1:1 with PT: 1015-11   Visit Count 1 2 3 4 5   Date:         Manuals        Intermittent lumbar traction                         Neuro Re-Ed        Iso hip abd/add seated HEP       TA         TA+ seated march         Seated TA pball press downs                                 There Ex         Active warm up  NS      Seated child's pose with hands on table HEP       Seated cat cow within comfortable range  10x                                                       Ther Act                                                                                 Education

## 2025-07-11 ENCOUNTER — OFFICE VISIT (OUTPATIENT)
Dept: FAMILY MEDICINE CLINIC | Facility: CLINIC | Age: 75
End: 2025-07-11
Payer: MEDICARE

## 2025-07-11 VITALS
DIASTOLIC BLOOD PRESSURE: 74 MMHG | SYSTOLIC BLOOD PRESSURE: 108 MMHG | OXYGEN SATURATION: 98 % | BODY MASS INDEX: 28.38 KG/M2 | TEMPERATURE: 97.4 F | WEIGHT: 176.6 LBS | HEIGHT: 66 IN | HEART RATE: 91 BPM

## 2025-07-11 DIAGNOSIS — E03.9 HYPOTHYROIDISM, UNSPECIFIED TYPE: ICD-10-CM

## 2025-07-11 DIAGNOSIS — N18.30 BENIGN HYPERTENSION WITH CKD (CHRONIC KIDNEY DISEASE) STAGE III (HCC): ICD-10-CM

## 2025-07-11 DIAGNOSIS — I12.9 BENIGN HYPERTENSION WITH CKD (CHRONIC KIDNEY DISEASE) STAGE III (HCC): ICD-10-CM

## 2025-07-11 DIAGNOSIS — M54.16 LUMBAR RADICULOPATHY, ACUTE: ICD-10-CM

## 2025-07-11 DIAGNOSIS — Z00.00 MEDICARE ANNUAL WELLNESS VISIT, SUBSEQUENT: Primary | ICD-10-CM

## 2025-07-11 DIAGNOSIS — N18.30 STAGE 3 CHRONIC KIDNEY DISEASE, UNSPECIFIED WHETHER STAGE 3A OR 3B CKD (HCC): ICD-10-CM

## 2025-07-11 DIAGNOSIS — E78.2 MIXED HYPERLIPIDEMIA: ICD-10-CM

## 2025-07-11 DIAGNOSIS — Z12.31 SCREENING MAMMOGRAM FOR BREAST CANCER: ICD-10-CM

## 2025-07-11 PROCEDURE — G0439 PPPS, SUBSEQ VISIT: HCPCS | Performed by: PHYSICIAN ASSISTANT

## 2025-07-11 PROCEDURE — 99214 OFFICE O/P EST MOD 30 MIN: CPT | Performed by: PHYSICIAN ASSISTANT

## 2025-07-11 PROCEDURE — G2211 COMPLEX E/M VISIT ADD ON: HCPCS | Performed by: PHYSICIAN ASSISTANT

## 2025-07-11 RX ORDER — PREDNISONE 20 MG/1
TABLET ORAL
Qty: 12 TABLET | Refills: 0 | Status: SHIPPED | OUTPATIENT
Start: 2025-07-11

## 2025-07-11 NOTE — ASSESSMENT & PLAN NOTE
Lab Results   Component Value Date    EGFR 61 07/03/2025    EGFR 56 12/30/2024    EGFR 57 06/28/2024    CREATININE 0.93 07/03/2025    CREATININE 1.00 12/30/2024    CREATININE 0.99 06/28/2024     Blood pressure at goal continue current regimen.  Renal functions are stable.  Orders:    Comprehensive metabolic panel    CBC and differential

## 2025-07-11 NOTE — PATIENT INSTRUCTIONS
Medicare Preventive Visit Patient Instructions  Thank you for completing your Welcome to Medicare Visit or Medicare Annual Wellness Visit today. Your next wellness visit will be due in one year (7/12/2026).  The screening/preventive services that you may require over the next 5-10 years are detailed below. Some tests may not apply to you based off risk factors and/or age. Screening tests ordered at today's visit but not completed yet may show as past due. Also, please note that scanned in results may not display below.  Preventive Screenings:  Service Recommendations Previous Testing/Comments   Colorectal Cancer Screening  * Colonoscopy    * Fecal Occult Blood Test (FOBT)/Fecal Immunochemical Test (FIT)  * Fecal DNA/Cologuard Test  * Flexible Sigmoidoscopy Age: 45-75 years old   Colonoscopy: every 10 years (may be performed more frequently if at higher risk)  OR  FOBT/FIT: every 1 year  OR  Cologuard: every 3 years  OR  Sigmoidoscopy: every 5 years  Screening may be recommended earlier than age 45 if at higher risk for colorectal cancer. Also, an individualized decision between you and your healthcare provider will decide whether screening between the ages of 76-85 would be appropriate. Colonoscopy: 06/28/2023  FOBT/FIT: Not on file  Cologuard: Not on file  Sigmoidoscopy: Not on file    Screening Current     Breast Cancer Screening Age: 40+ years old  Frequency: every 1-2 years  Not required if history of left and right mastectomy Mammogram: 10/29/2024    Screening Current   Cervical Cancer Screening Between the ages of 21-29, pap smear recommended once every 3 years.   Between the ages of 30-65, can perform pap smear with HPV co-testing every 5 years.   Recommendations may differ for women with a history of total hysterectomy, cervical cancer, or abnormal pap smears in past. Pap Smear: Not on file    Screening Not Indicated   Hepatitis C Screening Once for adults born between 1945 and 1965  More frequently in  patients at high risk for Hepatitis C Hep C Antibody: Not on file        Diabetes Screening 1-2 times per year if you're at risk for diabetes or have pre-diabetes Fasting glucose: 89 mg/dL (7/3/2025)  A1C: 6.0 % (12/10/2020)  Screening Current   Cholesterol Screening Once every 5 years if you don't have a lipid disorder. May order more often based on risk factors. Lipid panel: 07/03/2025    Screening Not Indicated  History Lipid Disorder     Other Preventive Screenings Covered by Medicare:  Abdominal Aortic Aneurysm (AAA) Screening: covered once if your at risk. You're considered to be at risk if you have a family history of AAA.  Lung Cancer Screening: covers low dose CT scan once per year if you meet all of the following conditions: (1) Age 55-77; (2) No signs or symptoms of lung cancer; (3) Current smoker or have quit smoking within the last 15 years; (4) You have a tobacco smoking history of at least 20 pack years (packs per day multiplied by number of years you smoked); (5) You get a written order from a healthcare provider.  Glaucoma Screening: covered annually if you're considered high risk: (1) You have diabetes OR (2) Family history of glaucoma OR (3)  aged 50 and older OR (4)  American aged 65 and older  Osteoporosis Screening: covered every 2 years if you meet one of the following conditions: (1) You're estrogen deficient and at risk for osteoporosis based off medical history and other findings; (2) Have a vertebral abnormality; (3) On glucocorticoid therapy for more than 3 months; (4) Have primary hyperparathyroidism; (5) On osteoporosis medications and need to assess response to drug therapy.   Last bone density test (DXA Scan): 12/27/2024.  HIV Screening: covered annually if you're between the age of 15-65. Also covered annually if you are younger than 15 and older than 65 with risk factors for HIV infection. For pregnant patients, it is covered up to 3 times per  pregnancy.    Immunizations:  Immunization Recommendations   Influenza Vaccine Annual influenza vaccination during flu season is recommended for all persons aged >= 6 months who do not have contraindications   Pneumococcal Vaccine   * Pneumococcal conjugate vaccine = PCV13 (Prevnar 13), PCV15 (Vaxneuvance), PCV20 (Prevnar 20)  * Pneumococcal polysaccharide vaccine = PPSV23 (Pneumovax) Adults 19-65 yo with certain risk factors or if 65+ yo  If never received any pneumonia vaccine: recommend Prevnar 20 (PCV20)  Give PCV20 if previously received 1 dose of PCV13 or PPSV23   Hepatitis B Vaccine 3 dose series if at intermediate or high risk (ex: diabetes, end stage renal disease, liver disease)   Respiratory syncytial virus (RSV) Vaccine - COVERED BY MEDICARE PART D  * RSVPreF3 (Arexvy) CDC recommends that adults 60 years of age and older may receive a single dose of RSV vaccine using shared clinical decision-making (SCDM)   Tetanus (Td) Vaccine - COST NOT COVERED BY MEDICARE PART B Following completion of primary series, a booster dose should be given every 10 years to maintain immunity against tetanus. Td may also be given as tetanus wound prophylaxis.   Tdap Vaccine - COST NOT COVERED BY MEDICARE PART B Recommended at least once for all adults. For pregnant patients, recommended with each pregnancy.   Shingles Vaccine (Shingrix) - COST NOT COVERED BY MEDICARE PART B  2 shot series recommended in those 19 years and older who have or will have weakened immune systems or those 50 years and older     Health Maintenance Due:      Topic Date Due   • Hepatitis C Screening  Never done   • Breast Cancer Screening: Mammogram  10/29/2025   • DXA SCAN  12/27/2026   • Colorectal Cancer Screening  06/25/2033     Immunizations Due:      Topic Date Due   • COVID-19 Vaccine (4 - 2024-25 season) 09/01/2024   • Influenza Vaccine (1) 09/01/2025     Advance Directives   What are advance directives?  Advance directives are legal documents  that state your wishes and plans for medical care. These plans are made ahead of time in case you lose your ability to make decisions for yourself. Advance directives can apply to any medical decision, such as the treatments you want, and if you want to donate organs.   What are the types of advance directives?  There are many types of advance directives, and each state has rules about how to use them. You may choose a combination of any of the following:  Living will:  This is a written record of the treatment you want. You can also choose which treatments you do not want, which to limit, and which to stop at a certain time. This includes surgery, medicine, IV fluid, and tube feedings.   Durable power of  for healthcare (DPAHC):  This is a written record that states who you want to make healthcare choices for you when you are unable to make them for yourself. This person, called a proxy, is usually a family member or a friend. You may choose more than 1 proxy.  Do not resuscitate (DNR) order:  A DNR order is used in case your heart stops beating or you stop breathing. It is a request not to have certain forms of treatment, such as CPR. A DNR order may be included in other types of advance directives.  Medical directive:  This covers the care that you want if you are in a coma, near death, or unable to make decisions for yourself. You can list the treatments you want for each condition. Treatment may include pain medicine, surgery, blood transfusions, dialysis, IV or tube feedings, and a ventilator (breathing machine).  Values history:  This document has questions about your views, beliefs, and how you feel and think about life. This information can help others choose the care that you would choose.  Why are advance directives important?  An advance directive helps you control your care. Although spoken wishes may be used, it is better to have your wishes written down. Spoken wishes can be misunderstood, or  not followed. Treatments may be given even if you do not want them. An advance directive may make it easier for your family to make difficult choices about your care.   Urinary Incontinence   Urinary incontinence (UI)  is when you lose control of your bladder. UI develops because your bladder cannot store or empty urine properly. The 3 most common types of UI are stress incontinence, urge incontinence, or both.  Medicines:   May be given to help strengthen your bladder control. Report any side effects of medication to your healthcare provider.  Do pelvic muscle exercises often:  Your pelvic muscles help you stop urinating. Squeeze these muscles tight for 5 seconds, then relax for 5 seconds. Gradually work up to squeezing for 10 seconds. Do 3 sets of 15 repetitions a day, or as directed. This will help strengthen your pelvic muscles and improve bladder control.  Train your bladder:  Go to the bathroom at set times, such as every 2 hours, even if you do not feel the urge to go. You can also try to hold your urine when you feel the urge to go. For example, hold your urine for 5 minutes when you feel the urge to go. As that becomes easier, hold your urine for 10 minutes.   Self-care:   Keep a UI record.  Write down how often you leak urine and how much you leak. Make a note of what you were doing when you leaked urine.  Drink liquids as directed. You may need to limit the amount of liquid you drink to help control your urine leakage. Do not drink any liquid right before you go to bed. Limit or do not have drinks that contain caffeine or alcohol.   Prevent constipation.  Eat a variety of high-fiber foods. Good examples are high-fiber cereals, beans, vegetables, and whole-grain breads. Walking is the best way to trigger your intestines to have a bowel movement.  Exercise regularly and maintain a healthy weight.  Weight loss and exercise will decrease pressure on your bladder and help you control your leakage.   Use a  catheter as directed  to help empty your bladder. A catheter is a tiny, plastic tube that is put into your bladder to drain your urine.   Go to behavior therapy as directed.  Behavior therapy may be used to help you learn to control your urge to urinate.    Weight Management   Why it is important to manage your weight:  Being overweight increases your risk of health conditions such as heart disease, high blood pressure, type 2 diabetes, and certain types of cancer. It can also increase your risk for osteoarthritis, sleep apnea, and other respiratory problems. Aim for a slow, steady weight loss. Even a small amount of weight loss can lower your risk of health problems.  How to lose weight safely:  A safe and healthy way to lose weight is to eat fewer calories and get regular exercise. You can lose up about 1 pound a week by decreasing the number of calories you eat by 500 calories each day.   Healthy meal plan for weight management:  A healthy meal plan includes a variety of foods, contains fewer calories, and helps you stay healthy. A healthy meal plan includes the following:  Eat whole-grain foods more often.  A healthy meal plan should contain fiber. Fiber is the part of grains, fruits, and vegetables that is not broken down by your body. Whole-grain foods are healthy and provide extra fiber in your diet. Some examples of whole-grain foods are whole-wheat breads and pastas, oatmeal, brown rice, and bulgur.  Eat a variety of vegetables every day.  Include dark, leafy greens such as spinach, kale, omar greens, and mustard greens. Eat yellow and orange vegetables such as carrots, sweet potatoes, and winter squash.   Eat a variety of fruits every day.  Choose fresh or canned fruit (canned in its own juice or light syrup) instead of juice. Fruit juice has very little or no fiber.  Eat low-fat dairy foods.  Drink fat-free (skim) milk or 1% milk. Eat fat-free yogurt and low-fat cottage cheese. Try low-fat cheeses such  as mozzarella and other reduced-fat cheeses.  Choose meat and other protein foods that are low in fat.  Choose beans or other legumes such as split peas or lentils. Choose fish, skinless poultry (chicken or turkey), or lean cuts of red meat (beef or pork). Before you cook meat or poultry, cut off any visible fat.   Use less fat and oil.  Try baking foods instead of frying them. Add less fat, such as margarine, sour cream, regular salad dressing and mayonnaise to foods. Eat fewer high-fat foods. Some examples of high-fat foods include french fries, doughnuts, ice cream, and cakes.  Eat fewer sweets.  Limit foods and drinks that are high in sugar. This includes candy, cookies, regular soda, and sweetened drinks.  Exercise:  Exercise at least 30 minutes per day on most days of the week. Some examples of exercise include walking, biking, dancing, and swimming. You can also fit in more physical activity by taking the stairs instead of the elevator or parking farther away from stores. Ask your healthcare provider about the best exercise plan for you.    © Copyright [employer] 2018 Information is for End User's use only and may not be sold, redistributed or otherwise used for commercial purposes. All illustrations and images included in CareNotes® are the copyrighted property of [employer] Inc[employer] or [employer]

## 2025-07-11 NOTE — PROGRESS NOTES
Name: Ania Aaron      : 1950      MRN: [id number]  Encounter Provider: Tracy Hogan PA-C  Encounter Date: 2025   Encounter department: [facility]  :  Assessment & Plan  Medicare annual wellness visit, subsequent         Benign hypertension with CKD (chronic kidney disease) stage III (HCC)  Lab Results   Component Value Date    EGFR 61 2025    EGFR 56 2024    EGFR 57 2024    CREATININE 0.93 2025    CREATININE 1.00 2024    CREATININE 0.99 2024     Blood pressure at goal continue current regimen.  Renal functions are stable.  Orders:    Comprehensive metabolic panel    CBC and differential    Hypothyroidism, unspecified type  Continue levothyroxine 88 mcg.  Orders:    TSH, 3rd generation    T4    Stage 3 chronic kidney disease, unspecified whether stage 3a or 3b CKD (HCC)  Lab Results   Component Value Date    EGFR 61 2025    EGFR 56 2024    EGFR 57 2024    CREATININE 0.93 2025    CREATININE 1.00 2024    CREATININE 0.99 2024     Avoid over-the-counter NSAIDs       Mixed hyperlipidemia  Is at goal continue statin therapy and low-fat diet  Orders:    Lipid panel    Screening mammogram for breast cancer    Orders:    Mammo screening bilateral w 3d and cad; Future    Lumbar radiculopathy, acute  Continue rest.  Follow-up with physical therapy  Orders:    predniSONE 20 mg tablet; 1 tablet p.o. twice daily x 3 days, then 1 tablet daily for 3 days, then half a tablet daily for 3 days      Depression Screening and Follow-up Plan: Patient was screened for depression during today's encounter. They screened negative with a PHQ-2 score of 0.      Urinary Incontinence Plan of Care: counseling topics discussed: use restroom every 2 hours.       Preventive health issues were discussed with patient, and age appropriate screening tests were ordered as noted in patient's After Visit Summary. Personalized health advice and  appropriate referrals for health education or preventive services given if needed, as noted in patient's After Visit Summary.    History of Present Illness     Patient presents in the office for follow-up chronic condition.  He has hypertension with chronic kidney disease stage III.  She is currently on lisinopril-HCTZ 10-12.5.  Her blood pressure is at goal.  Hypothyroidism she is on levothyroxine 88 mcg.  Her current TSH and T4 are normal.  For hyperlipidemia she is on simvastatin 20 mg.  Panel is acceptable.  Patient's BUN and creatinine are normal.  GFR is stable at 61.  CBC is normal.  States she was at the ER the end of June for left sciatica.  She was given nicotine patches and Robaxin.  Patient states the Robaxin gave her diarrhea.  Is scheduled to start physical therapy.  Imaging was done.  States she does not really have back pain she has pain when she applies weight to her left foot which causes the pain to shoot down her left leg.  Will do a prednisone taper.       Patient Care Team:  Tracy Hogan PA-C as PCP - General (Family Medicine)  Tracy Hogan PA-C    Review of Systems   Constitutional:  Negative for activity change and unexpected weight change.   HENT:  Negative for ear pain and sore throat.    Eyes:  Negative for visual disturbance.   Respiratory:  Negative for cough, shortness of breath and wheezing.    Cardiovascular:  Negative for chest pain and leg swelling.   Gastrointestinal:  Negative for abdominal pain, blood in stool, constipation, diarrhea, nausea and vomiting.   Genitourinary:  Negative for difficulty urinating.   Musculoskeletal:  Positive for back pain. Negative for arthralgias and myalgias.   Skin:  Negative for rash.   Neurological:  Negative for dizziness, syncope, light-headedness and headaches.   Psychiatric/Behavioral:  Negative for self-injury, sleep disturbance and suicidal ideas. The patient is not nervous/anxious.      Medical History Reviewed by provider this  encounter:  Allergies  Meds  Problems       Annual Wellness Visit Questionnaire   Ania is here for her Subsequent Wellness visit.     Health Risk Assessment:   Patient rates overall health as good. Patient feels that their physical health rating is same. Patient is satisfied with their life. Eyesight was rated as same. Hearing was rated as same. Patient feels that their emotional and mental health rating is same. Patients states they are never, rarely angry. Patient states they are never, rarely unusually tired/fatigued. Pain experienced in the last 7 days has been some. Patient's pain rating has been 4/10. Patient states that she has experienced no weight loss or gain in last 6 months. Back pain   with  sciatica    Depression Screening:   PHQ-2 Score: 0      Fall Risk Screening:   In the past year, patient has experienced: no history of falling in past year      Urinary Incontinence Screening:   Patient has leaked urine accidently in the last six months.     Home Safety:  Patient does not have trouble with stairs inside or outside of their home. Patient has working smoke alarms and has working carbon monoxide detector. Home safety hazards include: none.     Nutrition:   Current diet is Regular.     Medications:   Patient is currently taking over-the-counter supplements. OTC medications include: see medication list. Patient is able to manage medications.     Activities of Daily Living (ADLs)/Instrumental Activities of Daily Living (IADLs):   Walk and transfer into and out of bed and chair?: Yes  Dress and groom yourself?: Yes    Bathe or shower yourself?: Yes    Feed yourself? Yes  Do your laundry/housekeeping?: Yes  Manage your money, pay your bills and track your expenses?: Yes  Make your own meals?: Yes    Do your own shopping?: Yes    Previous Hospitalizations:   Any hospitalizations or ED visits within the last 12 months?: Yes    How many hospitalizations have you had in the last year?:  1-2    Hospitalization Comments: Back  pain    Advance Care Planning:   Living will: No      Cognitive Screening:   Provider or family/friend/caregiver concerned regarding cognition?: No    Preventive Screenings      Cardiovascular Screening:    General: History Lipid Disorder and Screening Current      Diabetes Screening:     General: Screening Current      Colorectal Cancer Screening:     General: Screening Current      Breast Cancer Screening:     General: Screening Current      Cervical Cancer Screening:    General: Screening Not Indicated      Osteoporosis Screening:    General: Screening Current      Abdominal Aortic Aneurysm (AAA) Screening:        General: Screening Not Indicated      Lung Cancer Screening:     General: Screening Not Indicated    Immunizations:  - Immunizations due: Zoster (Shingrix)    Screening, Brief Intervention, and Referral to Treatment (SBIRT)     Screening  Typical number of drinks in a day: 0  Typical number of drinks in a week: 0  Interpretation: Low risk drinking behavior.    Single Item Drug Screening:  How often have you used an illegal drug (including marijuana) or a prescription medication for non-medical reasons in the past year? never    Single Item Drug Screen Score: 0  Interpretation: Negative screen for possible drug use disorder    Brief Intervention  Alcohol & drug use screenings were reviewed. No concerns regarding substance use disorder identified.     Social Drivers of Health     Financial Resource Strain: Low Risk  (1/8/2024)    Overall Financial Resource Strain (CARDIA)     Difficulty of Paying Living Expenses: Not hard at all   Food Insecurity: No Food Insecurity (7/11/2025)    Nursing - Inadequate Food Risk Classification     Worried About Running Out of Food in the Last Year: Never true     Ran Out of Food in the Last Year: Never true   Transportation Needs: No Transportation Needs (7/11/2025)    PRAPARE - Transportation     Lack of Transportation (Medical): No  "    Lack of Transportation (Non-Medical): No   Housing Stability: Low Risk  (7/11/2025)    Housing Stability Vital Sign     Unable to Pay for Housing in the Last Year: No     Number of Times Moved in the Last Year: 0     Homeless in the Last Year: No   Utilities: Not At Risk (7/11/2025)    [facility]     Threatened with loss of utilities: No     No results found.    Objective   /74 (BP Location: Left arm, Patient Position: Sitting, Cuff Size: Large)   Pulse 91   Temp (!) 97.4 °F (36.3 °C) (Temporal)   Ht 5' 6\" (1.676 m)   Wt 80.1 kg (176 lb 9.6 oz)   SpO2 98%   Breastfeeding No   BMI 28.50 kg/m²     Physical Exam  Vitals and nursing note reviewed.   Constitutional:       General: She is not in acute distress.     Appearance: Normal appearance. She is well-developed. She is not ill-appearing or diaphoretic.   HENT:      Head: Normocephalic and atraumatic.      Right Ear: Tympanic membrane, ear canal and external ear normal.      Left Ear: Tympanic membrane, ear canal and external ear normal.     Eyes:      Conjunctiva/sclera: Conjunctivae normal.      Pupils: Pupils are equal, round, and reactive to light.     Neck:      Thyroid: No thyromegaly.      Vascular: No carotid bruit.     Cardiovascular:      Rate and Rhythm: Normal rate and regular rhythm.      Heart sounds: Normal heart sounds. No murmur heard.     No friction rub.   Pulmonary:      Effort: Pulmonary effort is normal. No respiratory distress.      Breath sounds: Normal breath sounds. No wheezing.   Abdominal:      General: Bowel sounds are normal. There is no distension.      Palpations: Abdomen is soft. There is no mass.      Tenderness: There is no abdominal tenderness.     Musculoskeletal:      Right lower leg: No edema.      Left lower leg: No edema.      Comments: Has a history of leg raising lying.  Extremity strength intact to passive resistance.  patella reflexes are intact.  Patient has good flexion.  She is unable to stand " straight due to reproducing the pain.  Lumbar spine is clear and nontender to palpation.  Left buttocks is nontender   Lymphadenopathy:      Cervical: No cervical adenopathy.     Skin:     General: Skin is warm and dry.     Neurological:      General: No focal deficit present.      Mental Status: She is alert and oriented to person, place, and time.     Psychiatric:         Mood and Affect: Mood normal.         Behavior: Behavior normal.         Thought Content: Thought content normal.         Judgment: Judgment normal.

## 2025-07-11 NOTE — ASSESSMENT & PLAN NOTE
Lab Results   Component Value Date    EGFR 61 07/03/2025    EGFR 56 12/30/2024    EGFR 57 06/28/2024    CREATININE 0.93 07/03/2025    CREATININE 1.00 12/30/2024    CREATININE 0.99 06/28/2024     Avoid over-the-counter NSAIDs

## 2025-07-17 ENCOUNTER — OFFICE VISIT (OUTPATIENT)
Dept: PHYSICAL THERAPY | Facility: CLINIC | Age: 75
End: 2025-07-17
Payer: MEDICARE

## 2025-07-17 DIAGNOSIS — M54.42 ACUTE LEFT-SIDED LOW BACK PAIN WITH LEFT-SIDED SCIATICA: Primary | ICD-10-CM

## 2025-07-17 PROCEDURE — 97110 THERAPEUTIC EXERCISES: CPT | Performed by: PHYSICAL THERAPIST

## 2025-07-17 PROCEDURE — 97140 MANUAL THERAPY 1/> REGIONS: CPT | Performed by: PHYSICAL THERAPIST

## 2025-07-17 PROCEDURE — 97112 NEUROMUSCULAR REEDUCATION: CPT | Performed by: PHYSICAL THERAPIST

## 2025-07-17 NOTE — PROGRESS NOTES
"Daily Note     Today's date: 2025  Patient name: Ania Aaron  : 1950  MRN: [id number]  Referring provider: Ryan Yang, PT  Dx:   Encounter Diagnosis     ICD-10-CM    1. Acute left-sided low back pain with left-sided sciatica  M54.42                      Subjective: Pt reports that she's been feeling better. Still gets bad at the end of the day around dinner time       Objective: See treatment diary below      Assessment: Tolerated treatment well today. Better able to move between exercises compared to IE, but still limited in achieving extension with standing. Educated patient on pacing herself during the day with activities.      Plan: Continue per plan of care.      Precautions: spinal stenosis, CKD stage 3     Re-Eval: 7/10-   Authorization: [employer]   1:1 with PT: [id number]   Visit Count 1 2 3 4 5   Date:  7/10 7/17      Manuals        Intermittent lumbar traction   Sidelying gapping      STM paraspinals  YOMAIRA                       Neuro Re-Ed        Iso hip abd/add seated HEP 10x10\"      TA         TA+ seated march         Seated TA pball press downs         Hooklying iso alt clamshell  RTB 2x10       Mini PPT  2x10      Supine SLR  2x10               There Ex         Active warm up   NS     Seated child's pose with hands on table HEP       Seated cat cow within comfortable range  10x  10x       Heel raises  20x      Pball rolls- seated  10x       LTR  10xs                             Ther Act                                                                                 Education                                            "

## 2025-07-24 ENCOUNTER — OFFICE VISIT (OUTPATIENT)
Dept: PHYSICAL THERAPY | Facility: CLINIC | Age: 75
End: 2025-07-24
Payer: MEDICARE

## 2025-07-24 DIAGNOSIS — M54.42 ACUTE LEFT-SIDED LOW BACK PAIN WITH LEFT-SIDED SCIATICA: Primary | ICD-10-CM

## 2025-07-24 PROCEDURE — 97110 THERAPEUTIC EXERCISES: CPT | Performed by: PHYSICAL THERAPIST

## 2025-07-24 PROCEDURE — 97530 THERAPEUTIC ACTIVITIES: CPT | Performed by: PHYSICAL THERAPIST

## 2025-07-24 PROCEDURE — 97112 NEUROMUSCULAR REEDUCATION: CPT | Performed by: PHYSICAL THERAPIST

## 2025-07-24 NOTE — PROGRESS NOTES
"Daily Note     Today's date: 2025  Patient name: Ania Aaron  : 1950  MRN: [id number]  Referring provider: Ryan Yang, PT  Dx:   Encounter Diagnosis     ICD-10-CM    1. Acute left-sided low back pain with left-sided sciatica  M54.42                      Subjective: Pt reports that she's feeling a lot better. She walked around the [email or web address] yesterday and had no issues.       Objective: See treatment diary below      Assessment: Patient did well with treatment today. Much improved tolerance to standing exercises with no discomfort. Patient with positive response to new exercise.       Plan: Continue per plan of care.      Precautions: spinal stenosis, CKD stage 3     Re-Eval: 7/10-   Authorization: [employer]   1:1 with PT: [id number]   Visit Count 1 2 3 4 5   Date:  7/10 7/17 7/24     Manuals        Intermittent lumbar traction   Sidelying gapping      STM paraspinals  YOMAIRA                       Neuro Re-Ed        Iso hip abd/add seated HEP 10x10\" 10x10\"     TA         TA+ seated march         Seated TA pball press downs         Hooklying iso alt clamshell  RTB 2x10  RTB 2x10     Sidelying clamshell   RTB 2x10      Standing hip abd   2x10     Standing hip ext   2x10     Mini PPT  2x10      Supine SLR  2x10  2x10     TA + pull down   RTB 2x10              There Ex         Active warm up   NS 7' L1     Seated child's pose with hands on table HEP       Seated cat cow within comfortable range  10x  10x       Heel raises  20x      Pball rolls- seated  10x  10x     LTR  10xs                             Ther Act             Suitcase carry   5# 4x30ft     Sit to stand    2x10                                                         Education                                              "

## 2025-07-29 ENCOUNTER — OFFICE VISIT (OUTPATIENT)
Dept: PHYSICAL THERAPY | Facility: CLINIC | Age: 75
End: 2025-07-29
Payer: MEDICARE

## 2025-07-29 DIAGNOSIS — M54.42 ACUTE LEFT-SIDED LOW BACK PAIN WITH LEFT-SIDED SCIATICA: Primary | ICD-10-CM

## 2025-07-29 PROCEDURE — 97530 THERAPEUTIC ACTIVITIES: CPT | Performed by: PHYSICAL THERAPIST

## 2025-07-29 PROCEDURE — 97110 THERAPEUTIC EXERCISES: CPT | Performed by: PHYSICAL THERAPIST

## 2025-07-29 PROCEDURE — 97112 NEUROMUSCULAR REEDUCATION: CPT | Performed by: PHYSICAL THERAPIST
